# Patient Record
Sex: MALE | Race: WHITE | NOT HISPANIC OR LATINO | URBAN - METROPOLITAN AREA
[De-identification: names, ages, dates, MRNs, and addresses within clinical notes are randomized per-mention and may not be internally consistent; named-entity substitution may affect disease eponyms.]

---

## 2023-07-18 ENCOUNTER — INPATIENT (INPATIENT)
Facility: HOSPITAL | Age: 58
LOS: 9 days | Discharge: NURSING, REHAB OR LONG-TERM CARE | End: 2023-07-28
Attending: INTERNAL MEDICINE | Admitting: INTERNAL MEDICINE
Payer: COMMERCIAL

## 2023-07-18 VITALS
RESPIRATION RATE: 18 BRPM | HEART RATE: 106 BPM | DIASTOLIC BLOOD PRESSURE: 120 MMHG | SYSTOLIC BLOOD PRESSURE: 180 MMHG | TEMPERATURE: 98 F

## 2023-07-18 DIAGNOSIS — R82.90 UNSPECIFIED ABNORMAL FINDINGS IN URINE: ICD-10-CM

## 2023-07-18 DIAGNOSIS — R19.00 INTRA-ABDOMINAL AND PELVIC SWELLING, MASS AND LUMP, UNSPECIFIED SITE: ICD-10-CM

## 2023-07-18 DIAGNOSIS — Z87.898 PERSONAL HISTORY OF OTHER SPECIFIED CONDITIONS: ICD-10-CM

## 2023-07-18 DIAGNOSIS — D69.6 THROMBOCYTOPENIA, UNSPECIFIED: ICD-10-CM

## 2023-07-18 DIAGNOSIS — R18.8 OTHER ASCITES: ICD-10-CM

## 2023-07-18 DIAGNOSIS — R74.01 ELEVATION OF LEVELS OF LIVER TRANSAMINASE LEVELS: ICD-10-CM

## 2023-07-18 DIAGNOSIS — R14.0 ABDOMINAL DISTENSION (GASEOUS): ICD-10-CM

## 2023-07-18 DIAGNOSIS — Z98.890 OTHER SPECIFIED POSTPROCEDURAL STATES: Chronic | ICD-10-CM

## 2023-07-18 DIAGNOSIS — Z29.9 ENCOUNTER FOR PROPHYLACTIC MEASURES, UNSPECIFIED: ICD-10-CM

## 2023-07-18 DIAGNOSIS — D53.9 NUTRITIONAL ANEMIA, UNSPECIFIED: ICD-10-CM

## 2023-07-18 DIAGNOSIS — R79.89 OTHER SPECIFIED ABNORMAL FINDINGS OF BLOOD CHEMISTRY: ICD-10-CM

## 2023-07-18 DIAGNOSIS — Z78.9 OTHER SPECIFIED HEALTH STATUS: ICD-10-CM

## 2023-07-18 DIAGNOSIS — M79.89 OTHER SPECIFIED SOFT TISSUE DISORDERS: ICD-10-CM

## 2023-07-18 LAB
ALBUMIN SERPL ELPH-MCNC: 3.4 G/DL — SIGNIFICANT CHANGE UP (ref 3.3–5)
ALP SERPL-CCNC: 388 U/L — HIGH (ref 40–120)
ALT FLD-CCNC: 18 U/L — SIGNIFICANT CHANGE UP (ref 4–41)
ANION GAP SERPL CALC-SCNC: 16 MMOL/L — HIGH (ref 7–14)
APAP SERPL-MCNC: <10 UG/ML — LOW (ref 15–25)
APPEARANCE UR: ABNORMAL
APTT BLD: 45.8 SEC — HIGH (ref 27–36.3)
AST SERPL-CCNC: 91 U/L — HIGH (ref 4–40)
BASOPHILS # BLD AUTO: 0.04 K/UL — SIGNIFICANT CHANGE UP (ref 0–0.2)
BASOPHILS NFR BLD AUTO: 0.5 % — SIGNIFICANT CHANGE UP (ref 0–2)
BILIRUB SERPL-MCNC: 7.3 MG/DL — HIGH (ref 0.2–1.2)
BILIRUB UR-MCNC: ABNORMAL
BLD GP AB SCN SERPL QL: NEGATIVE — SIGNIFICANT CHANGE UP
BLOOD GAS VENOUS COMPREHENSIVE RESULT: SIGNIFICANT CHANGE UP
BUN SERPL-MCNC: 5 MG/DL — LOW (ref 7–23)
CALCIUM SERPL-MCNC: 8.3 MG/DL — LOW (ref 8.4–10.5)
CHLORIDE SERPL-SCNC: 97 MMOL/L — LOW (ref 98–107)
CO2 SERPL-SCNC: 23 MMOL/L — SIGNIFICANT CHANGE UP (ref 22–31)
COLOR SPEC: ABNORMAL
CREAT SERPL-MCNC: 0.61 MG/DL — SIGNIFICANT CHANGE UP (ref 0.5–1.3)
DIFF PNL FLD: NEGATIVE — SIGNIFICANT CHANGE UP
EGFR: 113 ML/MIN/1.73M2 — SIGNIFICANT CHANGE UP
EOSINOPHIL # BLD AUTO: 0.05 K/UL — SIGNIFICANT CHANGE UP (ref 0–0.5)
EOSINOPHIL NFR BLD AUTO: 0.6 % — SIGNIFICANT CHANGE UP (ref 0–6)
ETHANOL SERPL-MCNC: 35 MG/DL — HIGH
GLUCOSE SERPL-MCNC: 87 MG/DL — SIGNIFICANT CHANGE UP (ref 70–99)
GLUCOSE UR QL: NEGATIVE MG/DL — SIGNIFICANT CHANGE UP
HCT VFR BLD CALC: 36.3 % — LOW (ref 39–50)
HGB BLD-MCNC: 12.1 G/DL — LOW (ref 13–17)
IANC: 7.45 K/UL — HIGH (ref 1.8–7.4)
IMM GRANULOCYTES NFR BLD AUTO: 0.3 % — SIGNIFICANT CHANGE UP (ref 0–0.9)
INR BLD: 1.5 RATIO — HIGH (ref 0.88–1.16)
KETONES UR-MCNC: NEGATIVE MG/DL — SIGNIFICANT CHANGE UP
LEUKOCYTE ESTERASE UR-ACNC: ABNORMAL
LIDOCAIN IGE QN: 22 U/L — SIGNIFICANT CHANGE UP (ref 7–60)
LYMPHOCYTES # BLD AUTO: 0.69 K/UL — LOW (ref 1–3.3)
LYMPHOCYTES # BLD AUTO: 7.8 % — LOW (ref 13–44)
MAGNESIUM SERPL-MCNC: 1.1 MG/DL — LOW (ref 1.6–2.6)
MCHC RBC-ENTMCNC: 33.3 GM/DL — SIGNIFICANT CHANGE UP (ref 32–36)
MCHC RBC-ENTMCNC: 34.6 PG — HIGH (ref 27–34)
MCV RBC AUTO: 103.7 FL — HIGH (ref 80–100)
MONOCYTES # BLD AUTO: 0.6 K/UL — SIGNIFICANT CHANGE UP (ref 0–0.9)
MONOCYTES NFR BLD AUTO: 6.8 % — SIGNIFICANT CHANGE UP (ref 2–14)
NEUTROPHILS # BLD AUTO: 7.45 K/UL — HIGH (ref 1.8–7.4)
NEUTROPHILS NFR BLD AUTO: 84 % — HIGH (ref 43–77)
NITRITE UR-MCNC: POSITIVE
NRBC # BLD: 0 /100 WBCS — SIGNIFICANT CHANGE UP (ref 0–0)
NRBC # FLD: 0 K/UL — SIGNIFICANT CHANGE UP (ref 0–0)
NT-PROBNP SERPL-SCNC: 153 PG/ML — SIGNIFICANT CHANGE UP
PH UR: 5.5 — SIGNIFICANT CHANGE UP (ref 5–8)
PLATELET # BLD AUTO: 58 K/UL — LOW (ref 150–400)
POTASSIUM SERPL-MCNC: 3.6 MMOL/L — SIGNIFICANT CHANGE UP (ref 3.5–5.3)
POTASSIUM SERPL-SCNC: 3.6 MMOL/L — SIGNIFICANT CHANGE UP (ref 3.5–5.3)
PROT SERPL-MCNC: 7.9 G/DL — SIGNIFICANT CHANGE UP (ref 6–8.3)
PROT UR-MCNC: 30 MG/DL
PROTHROM AB SERPL-ACNC: 17.5 SEC — HIGH (ref 10.5–13.4)
RBC # BLD: 3.5 M/UL — LOW (ref 4.2–5.8)
RBC # FLD: 14.8 % — HIGH (ref 10.3–14.5)
RH IG SCN BLD-IMP: POSITIVE — SIGNIFICANT CHANGE UP
SODIUM SERPL-SCNC: 136 MMOL/L — SIGNIFICANT CHANGE UP (ref 135–145)
SP GR SPEC: 1.02 — SIGNIFICANT CHANGE UP (ref 1–1.03)
UROBILINOGEN FLD QL: 1 MG/DL — SIGNIFICANT CHANGE UP (ref 0.2–1)
WBC # BLD: 8.86 K/UL — SIGNIFICANT CHANGE UP (ref 3.8–10.5)
WBC # FLD AUTO: 8.86 K/UL — SIGNIFICANT CHANGE UP (ref 3.8–10.5)

## 2023-07-18 PROCEDURE — 71045 X-RAY EXAM CHEST 1 VIEW: CPT | Mod: 26

## 2023-07-18 PROCEDURE — 99223 1ST HOSP IP/OBS HIGH 75: CPT | Mod: GC

## 2023-07-18 PROCEDURE — 99285 EMERGENCY DEPT VISIT HI MDM: CPT

## 2023-07-18 RX ORDER — FOLIC ACID 0.8 MG
1 TABLET ORAL DAILY
Refills: 0 | Status: DISCONTINUED | OUTPATIENT
Start: 2023-07-19 | End: 2023-07-21

## 2023-07-18 RX ORDER — THIAMINE MONONITRATE (VIT B1) 100 MG
100 TABLET ORAL ONCE
Refills: 0 | Status: COMPLETED | OUTPATIENT
Start: 2023-07-18 | End: 2023-07-18

## 2023-07-18 RX ORDER — THIAMINE MONONITRATE (VIT B1) 100 MG
100 TABLET ORAL DAILY
Refills: 0 | Status: DISCONTINUED | OUTPATIENT
Start: 2023-07-19 | End: 2023-07-21

## 2023-07-18 RX ORDER — FOLIC ACID 0.8 MG
1 TABLET ORAL ONCE
Refills: 0 | Status: COMPLETED | OUTPATIENT
Start: 2023-07-18 | End: 2023-07-18

## 2023-07-18 RX ORDER — ACETAMINOPHEN 500 MG
650 TABLET ORAL EVERY 6 HOURS
Refills: 0 | Status: DISCONTINUED | OUTPATIENT
Start: 2023-07-18 | End: 2023-07-28

## 2023-07-18 RX ORDER — LANOLIN ALCOHOL/MO/W.PET/CERES
5 CREAM (GRAM) TOPICAL AT BEDTIME
Refills: 0 | Status: DISCONTINUED | OUTPATIENT
Start: 2023-07-18 | End: 2023-07-18

## 2023-07-18 RX ORDER — MAGNESIUM SULFATE 500 MG/ML
2 VIAL (ML) INJECTION
Refills: 0 | Status: COMPLETED | OUTPATIENT
Start: 2023-07-18 | End: 2023-07-18

## 2023-07-18 RX ORDER — ONDANSETRON 8 MG/1
4 TABLET, FILM COATED ORAL EVERY 8 HOURS
Refills: 0 | Status: DISCONTINUED | OUTPATIENT
Start: 2023-07-18 | End: 2023-07-18

## 2023-07-18 RX ORDER — LANOLIN ALCOHOL/MO/W.PET/CERES
3 CREAM (GRAM) TOPICAL AT BEDTIME
Refills: 0 | Status: DISCONTINUED | OUTPATIENT
Start: 2023-07-18 | End: 2023-07-18

## 2023-07-18 RX ADMIN — Medication 100 MILLIGRAM(S): at 14:36

## 2023-07-18 RX ADMIN — Medication 1 MILLIGRAM(S): at 14:36

## 2023-07-18 RX ADMIN — Medication 25 GRAM(S): at 18:19

## 2023-07-18 RX ADMIN — Medication 25 MILLIGRAM(S): at 14:36

## 2023-07-18 RX ADMIN — Medication 25 GRAM(S): at 20:33

## 2023-07-18 NOTE — ED ADULT TRIAGE NOTE - CHIEF COMPLAINT QUOTE
Pt states he drank a bottle of Nyquil over the past week, a little at a time, because he couldn't sleep. States after he started drinking it he developed swelling to abdomen, BLE swelling, and SOB. Denies chest pain. Denies cardiac history. Pt also noted with icteric sclera, states it developed over the past 2 weeks. Drinks whiskey every day, last drink 3 days ago. Hypertensive in triage, mildly tremulous.

## 2023-07-18 NOTE — ED ADULT NURSE NOTE - NS ED NOTE  TALK SOMEONE YN
Detail Level: Zone Initiate Treatment: Apply triamcinolone 0.1% BID x 2 weeks PRN flares Render In Strict Bullet Format?: No No

## 2023-07-18 NOTE — H&P ADULT - ATTENDING COMMENTS
56 yo M with PMHx of alcohol use disorder (3-5 drinks of double [place] whiskey 5 days a week x25 years) with no history of withdrawal syndrome presents with increasing abdominal distention and b/l LE edema for past 1.5 weeks, concerning for ascites in setting of possible liver failure from excessive alcohol use vs. Budd Chiari syndrome, lower suspicion for malignant ascites or 2/2 acute decompensated heart failure. CTAP with IV contrast and US abdominal dopplers ordered to further evaluate possible etiology of ascites. Pt also found to have elevated t bili to 7.3 and alk phos 388, concerning for possible biliary obstruction. MRCP w/wo contrast ordered, as unclear if visualization of biliary tree will be adequate on CTAP given ascites. Check direct vs. indirect bili and GGT. Given anemia and thrombocytopenia as well, will r/o MAHA/DIC process. F/u hemolysis labs, coags, D-dimer, and fibrinogen. Stool studies with C diff PCR, GI stool PCR, and stool cxs also ordered, as pt with multiple episodes of watery, nonbloody diarrhea.

## 2023-07-18 NOTE — H&P ADULT - PROBLEM SELECTOR PLAN 2
Last drink: a week ago, however pt w/ elevated alcohol blood level.    -s/p librium x1  -sx triggered ciwa  -seizure precautions  -aspiration precautions  -fall risk Pt with elevated alk phos and AST. Ddx: cholestatic/obstructive vs. hepatocellular injury    -trend LFTs  -f/u abdominal US  -MRCP for elevated TBili  -f/u dbili Pt with elevated t bili to 7.3, alk phos 388, AST 91, and ALT 18. Concerning for cholestasis/biliary obstruction with component of hepatocellular injury/hepatitis.     -f/u US abdominal dopplers and CTAP with IV contrast  -check MRCP w/wo contrast to evaluate biliary system  -check direct vs. indirect bili and GGT  -trend LFTs  -possible hepatology consult in AM pending imaging results Pt with elevated t bili to 7.3, alk phos 388, AST 91, and ALT 18. Concerning for cholestasis/biliary obstruction with component of hepatocellular injury/hepatitis.     -f/u US abdominal dopplers and CTAP with IV contrast  -check MRCP w/wo contrast to evaluate biliary system,  as unclear if visualization of biliary tree will be adequate on CTAP given ascites  -check direct vs. indirect bili and GGT  -trend LFTs  -possible hepatology consult in AM pending imaging results

## 2023-07-18 NOTE — PATIENT PROFILE ADULT - FALL HARM RISK - RISK INTERVENTIONS

## 2023-07-18 NOTE — ED ADULT NURSE NOTE - NSFALLUNIVINTERV_ED_ALL_ED
Bed/Stretcher in lowest position, wheels locked, appropriate side rails in place/Call bell, personal items and telephone in reach/Instruct patient to call for assistance before getting out of bed/chair/stretcher/Non-slip footwear applied when patient is off stretcher/[place] to call system/Physically safe environment - no spills, clutter or unnecessary equipment/Purposeful proactive rounding/Room/bathroom lighting operational, light cord in reach

## 2023-07-18 NOTE — H&P ADULT - NSHPPHYSICALEXAM_GEN_ALL_CORE
T(C): 37 (07-18-23 @ 16:17), Max: 37 (07-18-23 @ 16:17)  HR: 95 (07-18-23 @ 16:17) (95 - 106)  BP: 152/85 (07-18-23 @ 16:17) (152/85 - 180/120)  RR: 19 (07-18-23 @ 16:17) (18 - 20)  SpO2: 99% (07-18-23 @ 16:17) (99% - 99%)    PHYSICAL EXAM:  GENERAL: NAD, obese male in mild distress  HEAD:  Atraumatic, Normocephalic  EYES: EOMI, PERRLA, conjunctiva and sclera clear, + jaundice  ENMT: No tonsillar erythema, exudates, or enlargement; Moist mucous membranes  NECK: Supple, non tender, No JVD, Normal thyroid  HEART: Regular rate and rhythm; No murmurs, rubs, or gallops. S1/S2 present  RESPIRATORY: CTA B/L, No W/R/R  ABDOMEN: Soft, firm, Nontender, distended; Bowel sounds present. No hepatosplenomegaly. No fluid wave elicited.  NEUROLOGY: A&Ox3, nonfocal, moving all extremities  EXTREMITIES:  Pitting edema from knees down. 2+ Peripheral Pulses, No clubbing, cyanosis. 5/5 muscle tone in UE/LE. Black lesion on nail of R foot.   SKIN: erythematous circular lesions and patches below knees bilaterally, wrapping around calves. warm, dry T(C): 37 (07-18-23 @ 16:17), Max: 37 (07-18-23 @ 16:17)  HR: 95 (07-18-23 @ 16:17) (95 - 106)  BP: 152/85 (07-18-23 @ 16:17) (152/85 - 180/120)  RR: 19 (07-18-23 @ 16:17) (18 - 20)  SpO2: 99% (07-18-23 @ 16:17) (99% - 99%)    PHYSICAL EXAM:  GENERAL: NAD, obese male in mild distress  HEAD:  Atraumatic, Normocephalic  EYES: EOMI, PERRLA, conjunctiva and sclera clear, + jaundice  ENMT: No tonsillar erythema, exudates, or enlargement; Moist mucous membranes  NECK: Supple, non tender, No JVD, Normal thyroid  HEART: Regular rate and rhythm; No murmurs, rubs, or gallops. S1/S2 present  RESPIRATORY: CTA B/L, No W/R/R  ABDOMEN: Soft, firm, nontender with no rebound or guardind, distended; Bowel sounds present. No hepatosplenomegaly. No fluid wave elicited.  NEUROLOGY: A&Ox3, nonfocal, moving all extremities  EXTREMITIES:  Pitting edema from knees down. 2+ Peripheral Pulses, No clubbing, cyanosis. 5/5 muscle tone in UE/LE. Black lesion on nail of R foot.   SKIN: erythematous circular lesions and patches below knees bilaterally, wrapping around calves. warm, dry

## 2023-07-18 NOTE — H&P ADULT - PROBLEM SELECTOR PLAN 6
UA with nitrites and LE although contaminated.    -repeat UA Pt w/ leg edema. No  prior cardiac hx    -TTE r/o alcoholic cardiomyopathy Likely 2/2 cirrhosis  -trend plts  -transfuse <10 Likely from abdominal distention from ascites.    -vital signs q4  -pulse ox  -reevaluate following tap Plt count 58. Suspect from bone marrow suppression in setting of chronic alcohol use and possibly from liver failure. Low suspicion for TTP given lack of fevers, renal failure, and AMS, though DIC possibility given ?hyperpigmentation on b/l LE.    -monitor plt  -check coag, D-dimer, and fibrinogen to r/o DIC

## 2023-07-18 NOTE — H&P ADULT - NSHPLABSRESULTS_GEN_ALL_CORE
LABS: Personally reviewed labs, imaging, and ECG                          12.1   8.86  )-----------( 58       ( 2023 14:15 )             36.3           136  |  97<L>  |  5<L>  ----------------------------<  87  3.6   |  23  |  0.61    Ca    8.3<L>      2023 14:34  Mg     1.10         TPro  7.9  /  Alb  3.4  /  TBili  7.3<H>  /  DBili  x   /  AST  91<H>  /  ALT  18  /  AlkPhos  388<H>         LIVER FUNCTIONS - ( 2023 14:34 )  Alb: 3.4 g/dL / Pro: 7.9 g/dL / ALK PHOS: 388 U/L / ALT: 18 U/L / AST: 91 U/L / GGT: x                    Urinalysis Basic - ( 2023 16:42 )    Color: Orange / Appearance: Cloudy / S.024 / pH: x  Gluc: x / Ketone: Negative mg/dL  / Bili: Large / Urobili: 1.0 mg/dL   Blood: x / Protein: 30 mg/dL / Nitrite: Positive   Leuk Esterase: Small / RBC: 6 /HPF / WBC 1 /HPF   Sq Epi: x / Non Sq Epi: 3 /HPF / Bacteria: Negative /HPF        PT/INR - ( 2023 14:15 )   PT: 17.5 sec;   INR: 1.50 ratio         PTT - ( 2023 14:15 )  PTT:45.8 sec    Lactate Trend      CAPILLARY BLOOD GLUCOSE        RADIOLOGY & ADDITIONAL TESTS:     < from: Xray Chest 1 View AP/PA (23 @ 15:30) >    ACC: [id number] EXAM:  XR CHEST AP OR PA 1V   ORDERED BY: VIVIANA CUMMINGS     PROCEDURE DATE:  2023          INTERPRETATION:  EXAMINATION: XR CHEST    CLINICAL INDICATION: Shortness of breath.    TECHNIQUE: Single frontal, portable view of the chest was obtained.    COMPARISON: None available.    FINDINGS:  The cardiomediastinal silhouette is  normal in size.  The lungs are clear.  There is no pneumothorax or pleural effusion.  No acute bony abnormality.    IMPRESSION:  Clear lungs.      --- End of Report ---    < end of copied text > EKG personally reviewed.  SR with no acute ischemic changes, QTc prolonged at 504 ms.    Labs personally reviewed.                        12.1   8.86  )-----------( 58       ( 2023 14:15 )             36.3           136  |  97<L>  |  5<L>  ----------------------------<  87  3.6   |  23  |  0.61    Ca    8.3<L>      2023 14:34  Mg     1.10         TPro  7.9  /  Alb  3.4  /  TBili  7.3<H>  /  DBili  x   /  AST  91<H>  /  ALT  18  /  AlkPhos  388<H>       LIVER FUNCTIONS - ( 2023 14:34 )  Alb: 3.4 g/dL / Pro: 7.9 g/dL / ALK PHOS: 388 U/L / ALT: 18 U/L / AST: 91 U/L / GGT: x              Urinalysis Basic - ( 2023 16:42 )    Color: Orange / Appearance: Cloudy / S.024 / pH: x  Gluc: x / Ketone: Negative mg/dL  / Bili: Large / Urobili: 1.0 mg/dL   Blood: x / Protein: 30 mg/dL / Nitrite: Positive   Leuk Esterase: Small / RBC: 6 /HPF / WBC 1 /HPF   Sq Epi: x / Non Sq Epi: 3 /HPF / Bacteria: Negative /HPF    PT/INR - ( 2023 14:15 )   PT: 17.5 sec;   INR: 1.50 ratio      PTT - ( 2023 14:15 )  PTT:45.8 sec    Imaging personally reviewed.    < from: Xray Chest 1 View AP/PA (23 @ 15:30) >    ACC: [id number] EXAM:  XR CHEST AP OR PA 1V   ORDERED BY: VIVIANA CUMMINGS     PROCEDURE DATE:  2023          INTERPRETATION:  EXAMINATION: XR CHEST    CLINICAL INDICATION: Shortness of breath.    TECHNIQUE: Single frontal, portable view of the chest was obtained.    COMPARISON: None available.    FINDINGS:  The cardiomediastinal silhouette is  normal in size.  The lungs are clear.  There is no pneumothorax or pleural effusion.  No acute bony abnormality.    IMPRESSION:  Clear lungs.      --- End of Report ---    < end of copied text >

## 2023-07-18 NOTE — H&P ADULT - NSHPSOCIALHISTORY_GEN_ALL_CORE
Pt drinks 3-5 "double glass" of nayana whiskey after work for the past 25 years. Denied hx of withdrawal. Pt used to smoke a few tobacco cigarettes a day 20 years ago since high school. Pt endorsed hx of drug use with LSD and cocaine in the 70's but no longer uses illicit drugs. He lives at home alone, but his daughter lives close by and visits frequently. He works as a .  Has not seen a doctor since before COVID. Pt lives at home alone, but his daughter lives close by and visits frequently. He works as a . Has not seen a doctor since before COVID.

## 2023-07-18 NOTE — ED ADULT NURSE NOTE - OBJECTIVE STATEMENT
pt A&ox4, coming to ED from home for abdominal swelling and SOB after taking 1 bottle of Nyquil over 1 week. pt states he drinks 3-5 doubles of whiskey every night for multiple years. denies going through ETOH withdrawal or seizures. pt endorses SOB and leg swelling. pt denies H/A , Dizziness , lightheadedness and radiating chest pain. sinus tachycardic on telemetry. breathing is spontaneous and unlabored. sating 99% on RA. abdomen is firm, distended, and nontender. bilateral lower extremities swelling noted. bilateral pedal and radial pulses palpable and strong. left 18g IV placed Labs drawn and sent as per ordered. Bed in lowest position, call bell within reach, all other safety and comfort measures provided. awaiting labs results and further orders.

## 2023-07-18 NOTE — H&P ADULT - NSHPREVIEWOFSYSTEMS_GEN_ALL_CORE
REVIEW OF SYSTEMS:    CONSTITUTIONAL: No weakness, fevers or chills  EYES/ENT: No visual changes;  No vertigo or throat pain   NECK: No pain or stiffness  RESPIRATORY: + SOB. No cough, wheezing, hemoptysis  CARDIOVASCULAR: No chest pain or palpitations  GASTROINTESTINAL: + loose stools. No abdominal or epigastric pain. No nausea, vomiting, or hematemesis; No constipation. No melena or hematochezia.  GENITOURINARY: No dysuria, frequency or hematuria  NEUROLOGICAL: No numbness or weakness  SKIN: +rashes of LE Constitutional: +Weight gain. No generalized weakness, fevers, chills, or weight loss.  Eyes: No visual changes, double vision, or eye pain  Ears, Nose, Mouth, Throat: No runny nose, sinus pain, ear pain, tinnitus, sore throat, dysphagia, or odynophagia  Cardiovascular: No chest pain, palpitations, or LE edema  Respiratory: +SOB. No cough, wheezing, or hemoptysis.  Gastrointestinal: +Abdominal distention and watery diarrhea. No abdominal pain, nausea/vomiting, constipation, hematemesis, melena, or BRBPR.  Genitourinary: No dysuria, frequency, urgency, or hematuria  Musculoskeletal: No neck pain or back pain. No joint pain, swelling, or decreased ROM.  Skin: +B/l LE rash. No lesions or wounds  Neurologic: No syncope, seizures, headache, paresthesias, numbness, or limb weakness  Psychiatric: No depression, anxiety, difficulty concentrating, anhedonia, or lack of energy  Endocrine: No heat/cold intolerance, mood swings, sweats, polydipsia, or polyuria  Hematologic/lymphatic: No purpura, petechia, or prolonged or excessive bleeding after dental extraction / injury    Positives and pertinent negatives noted and all other systems negative.

## 2023-07-18 NOTE — ED PROVIDER NOTE - CLINICAL SUMMARY MEDICAL DECISION MAKING FREE TEXT BOX
[facility] PGY3: 56yo M with PMH of EtOH abuse presents to ED for eval of abd swelling & jaundice in setting of EtOH and possible overuse of acetaminophen. Last EtOH yesterday night. Check labs including hepatitis/EtOH/acetaminophen level. SOB likely due to massive ascites. Plan for therapeutic tap, low concern for SBP but will send studies post-tap. Likely TBA.

## 2023-07-18 NOTE — H&P ADULT - PROBLEM SELECTOR PLAN 7
DVT ppx: hold for tap    Health maintenance:  A1c, lipid panel    Diet: NPO for now    Dispo: [facility] UA with nitrites and LE although contaminated.    -repeat UA Pt w/ leg edema. No  prior cardiac hx    -TTE r/o alcoholic cardiomyopathy Likely 2/2 cirrhosis  -trend plts  -transfuse <10 Pt drinks 3-5 "double glass" of nayana whiskey after work for the past 25 years. Denied hx of withdrawal. Last drink: a week ago, however pt w/ elevated alcohol blood level.    -s/p librium x1  -sx triggered ciwa  -thiamine 100 mg 7/18-7/22  -folic acid 1 mcg daily  -seizure precautions  -aspiration precautions  -fall risk  -SW consult Pt drinks 3-5 "double glass" of French whiskey after work for the past 25 years. Denies hx of withdrawal syndrome. Last drink a week ago, but pt with elevated blood alcohol level. On exam, no tongue fasciculations or tremors.    -s/p librium x1  -start symptom-triggered CIWA with Ativan for now  -start PO thiamine 100 mg daily, folic acid 1 mg daily, and MVI  -aspiration precautions  -seizure precautions  -fall risk protocol  -SW consult

## 2023-07-18 NOTE — H&P ADULT - HISTORY OF PRESENT ILLNESS
56 yo M with PMHx of EtOH abuse (3-5 double nayana whiskey 5 days a week x25 years) presents for abdominal and leg swelling since last Sat (7/8) after his children insisted he seek help. Pt stated that the swelling started in his ankles and progressed upward, with his abdomen gradually growing in size. For last 1.5 wk he has also been taking 'gulps' of nyquil (acetaminophen 1000, benadryl 50) for sleep only once daily or every other day. Pt has not seen a doctor since before COVID because it was difficult to get an appointment. Pt stated that since the swelling started, his bowel movements have been more loose (last BM yesterday) and his kids noticed his eyes have turned yellow. He also feels SOB at rest and with exertion. Lying at angle makes the SOB more tolerable. Pt stated his last drink was a week ago because his daughter works at a bar and would not let him drink. Denied hx of alcohol withdrawal. Denied abdominal pain, CP, nausea or vomiting. He stated he has not eaten since last night because he did not want to make the swelling worse. Pt also with rashes below the knees which started a few months ago. He tried creams which did not help.     In ED, pt received librium 25 x1, folic acid, mag x2, thiamine.  56 yo M with PMHx of alcohol use disorder (3-5 double nayana whiskey 5 days a week x25 years) presents for abdominal and leg swelling since last Sat (7/8) after his children insisted he seek help. Pt stated that the swelling started in his ankles and progressed upward, with his abdomen gradually growing in size. For last 1.5 wk he has also been taking 'gulps' of nyquil (acetaminophen 1000, benadryl 50) for sleep only once daily or every other day. Pt has not seen a doctor since before COVID because it was difficult to get an appointment. Pt stated that since the swelling started, his bowel movements have been more loose (last BM yesterday) and his kids noticed his eyes have turned yellow. He also feels SOB at rest and with exertion. Lying at angle makes the SOB more tolerable. Pt stated his last drink was a week ago because his daughter works at a bar and would not let him drink. Denied hx of alcohol withdrawal. Denied abdominal pain, CP, nausea or vomiting. He stated he has not eaten since last night because he did not want to make the swelling worse. Pt also with rashes below the knees which started a few months ago. He tried creams which did not help.     In ED, pt received librium 25 x1, folic acid, mag x2, thiamine.  58 yo M with PMHx of alcohol use disorder (3-5 drinks of double [place] whiskey 5 days a week x25 years) with no history of withdrawal syndrome presents with increasing abdominal distention and b/l LE edema since last Saturday (7/8/23) after his children insisted he seek help. Pt stated that the swelling started in his ankles and progressed upward, with his abdomen gradually growing in size. For last 1.5 wks, he has also been taking 'gulps' of NyQuil (acetaminophen 1000, benadryl 50) for sleep only once daily or every other day. Pt has not seen a doctor since before COVID because it was difficult to get an appointment. Pt stated that since the swelling started, his bowel movements have been more loose (last BM yesterday) and his kids noticed his eyes have turned yellow. He also feels SOB at rest and with exertion. Lying at angle makes the SOB more tolerable. Pt stated his last drink was a week ago because his daughter works at a bar and would not let him drink. Denied hx of alcohol withdrawal. Denied abdominal pain, CP, nausea or vomiting. He stated he has not eaten since last night because he did not want to make the swelling worse. Pt also with rashes below the knees which started a few months ago. He tried creams which did not help.     In ED, pt received librium 25 x1, folic acid, mag x2, thiamine.  56 yo M with PMHx of alcohol use disorder (3-5 drinks of double [place] whiskey 5 days a week x25 years) with no history of withdrawal syndrome presents with increasing abdominal distention and b/l LE edema since last Saturday (7/8/23) after his children insisted he seek help. Pt stated that his LE edema started in his ankles and progressed upward, with his abdomen gradually growing in size. For last 1.5 wks, he has also been taking 'gulps' of NyQuil (acetaminophen 1000, benadryl 50) for sleep only once daily or every other day. Pt has not seen a doctor since before COVID because it was difficult to get an appointment. Pt stated that since the swelling started, his bowel movements were getting more loose, and his kids noticed his eyes were yellow. He also feels SOB at rest and with exertion. Lying at angle makes the SOB more tolerable. Pt stated his last drink was a week ago because his daughter works at a bar and would not let him drink. Denied hx of alcohol withdrawal. Denied abdominal pain, CP, nausea or vomiting. He stated he has not eaten since last night because he did not want to make the swelling worse. This morning, pt started having multiple episodes of watery nonbloody diarrhea, at least 5 before coming to the ED. Pt also with rashes below the knees which started a few months ago. He tried creams which did not help.     In ED, pt received librium 25 x1, folic acid, mag x2, thiamine.

## 2023-07-18 NOTE — H&P ADULT - PROBLEM SELECTOR PLAN 9
DVT ppx: hold for tap    Health maintenance:  A1c, lipid panel    Diet: NPO for now    Dispo: [facility] UA with nitrites and LE although contaminated.    -repeat UA QTc on admission  ms. Likely in setting of hypomagnesemia with serum Mg 1.1 on admission.     -avoid QT prolonging agents  -replete serum Mg with IV MgSO4 and recheck QTc with repeat EKG

## 2023-07-18 NOTE — H&P ADULT - PROBLEM SELECTOR PLAN 5
DVT ppx: hold for tap    Diet: NPO for now    Dispo: [facility] Pt with elevated alk phos and AST. Ddx: cirrhosis vs. fatty liver    -trend LFTs  -f/u abdominal US Likely 2/2 cirrhosis  -trend plts  -transfuse <10 Likely from abdominal distention from ascites.    -vital signs q4  -pulse ox  -reevaluate following tap Pt w/ recent diarrhea, no N/V.    -c.diff   -isolation precautions  -GI PCR  -stool cx Pt reporting 5 episodes of watery, nonbloody diarrhea that started Tuesday morning.     - C diff PCR, GI stool PCR, and stool cxs ordered  - F/u CTAP with IV contrast  - Monitor stool count  - Monitor electrolytes and replete PRN Hgb 12.1 with . Macrocytic anemia, likely in setting of chronic alcohol use with folate/vit B12 deficiency, though given elevated t bili and thrombocytopenia, MAHA also a possibility.    -monitor H/H   -check iron studies, folate, vit B12  -check hemolysis labs, including LDH, haptoglobin, retic count  -check coag, D-dimer, and fibrinogen

## 2023-07-18 NOTE — ED PROVIDER NOTE - ATTENDING CONTRIBUTION TO CARE
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

## 2023-07-18 NOTE — ED ADULT NURSE REASSESSMENT NOTE - NS ED NURSE REASSESS COMMENT FT1
pt A&ox4, denies chest pain. no complaints of pain. denies headache, dizziness, lightheadedness, radiating chest pain. breathing is spontaneous and unlabored. NSR on continuos cardiac and pulse ox monitoring in place. bed in lowest position, call bell within reach, siderails up x2. will continue to monitor.

## 2023-07-18 NOTE — H&P ADULT - PROBLEM SELECTOR PLAN 3
-trend hgb  -B12, folate  -supplement with folic acid daily -trend hgb  -B12, folate, iron  -retic, LDH, hapto  -supplement with folic acid daily Pt reporting 5 episodes of watery, nonbloody diarrhea that started Tuesday morning. Unclear etiology.     -C diff PCR, GI stool PCR, and stool cxs ordered  -f/u CTAP with IV contrast  -monitor stool count  -monitor electrolytes and replete PRN  -isolation precautions pending C diff

## 2023-07-18 NOTE — H&P ADULT - PROBLEM SELECTOR PLAN 11
DVT ppx: hold for tap    Health maintenance:  A1c, lipid panel    Diet: NPO for now    Dispo: [facility]

## 2023-07-18 NOTE — H&P ADULT - PROBLEM SELECTOR PLAN 1
Likely ascites iso chronic alcohol use disorder.    -placed procedure team order  -therapeutic tap w/ studies (saag, cell count diff, cx, total protein)  -pt currently w/o leukocytosis or metabolic acidosis unlikely SBP  -abdominal ultrasound  -CT abdomen  -thiamine 100 mg 7/18-7/22  -folic acid 1 mcg daily Likely ascites iso chronic alcohol use disorder.    -placed procedure team order  -therapeutic tap w/ studies (saag, cell count diff, cx, total protein)  -pt currently w/o fevers, leukocytosis, or abdominal pain/tenderness, unlikely SBP  -abdominal US ordered  -CTAP with IV contrast ordered as urgent, attempted to expedite overnight  -serial abdominal exams Pt with 1.5 weeks of increasing abdominal distention and b/l LE edema. On exam. tense ascites appreciated. Concerning for ascites in setting of possible liver failure vs. Budd Chiari syndrome, less likely malignant ascites or from acute decompensated heart failure.    -pt will likely need diagnostic and large-volume therapeutic paracentesis -> procedure team consult placed  -possible IR-guided paracentesis if adequate window unable to be obtained  -pt currently w/o fevers, leukocytosis, or abdominal pain/tenderness, unlikely SBP -> monitor off antibiotics for now  -US abdominal dopplers ordered to evaluate for cirrhosis and r/o hepatic vein thrombosis/Budd Chiari syndrome  -CTAP with IV contrast ordered as urgent, attempted to expedite overnight, but per ED, CT studies currently taking 5-6 hours  -TTE ordered to r/o structural heart disease  -serial abdominal exams Pt with 1.5 weeks of increasing abdominal distention and b/l LE edema. On exam. tense ascites appreciated. Concerning for ascites in setting of possible liver failure from alcohol/Tylenol use vs. Budd Chiari syndrome, less likely malignant ascites or from acute decompensated heart failure.    -pt will likely need diagnostic and large-volume therapeutic paracentesis -> procedure team consult placed  -possible IR-guided paracentesis if adequate window unable to be obtained  -pt currently w/o fevers, leukocytosis, or abdominal pain/tenderness, unlikely SBP -> monitor off antibiotics for now  -US abdominal dopplers ordered to evaluate for cirrhosis and r/o hepatic vein thrombosis/Budd Chiari syndrome  -CTAP with IV contrast ordered as urgent, attempted to expedite overnight, but per ED, CT studies currently taking 5-6 hours  -TTE ordered to r/o structural heart disease  -serial abdominal exams  -acetaminophen level negative

## 2023-07-18 NOTE — H&P ADULT - PROBLEM SELECTOR PLAN 10
DVT ppx: hold for tap    Health maintenance:  A1c, lipid panel    Diet: NPO for now    Dispo: AMIRAH QTc on admission  ms. Likely in setting of hypomagnesemia.     - Avoid QT prolonging agents  - Replete serum Mg and recheck QTc with repeat EKG DVT ppx: Hold pharmacologic ppx for now given possible paracentesis in AM and thrombocytopenia  Diet: NPO for now pending MRCP

## 2023-07-18 NOTE — ED PROVIDER NOTE - PROGRESS NOTE DETAILS
[place] PGY3: offered patient tap given prior SOB but pt feels improved and doesn't want to be stuck twice so declines ED tap at this time. O2 still >95% and breathing more comfortably. Hospitalist request CT, ordered.

## 2023-07-18 NOTE — ED PROVIDER NOTE - PHYSICAL EXAMINATION
CONSTITUTIONAL: NAD  SKIN: jaundiced, dry  HEAD: NCAT  EYES: NL inspection, +icterus   ENT: dry oral mucosa  NECK: Supple; non tender.  CARD: RRR  RESP: shallow, mildly tacypnic but lungs clear to auscultation  ABD: tense, very distended, +fluid wave, non-tender  EXT: b/l pitting edema  NEURO: Grossly non-focal   PSYCH: Cooperative, appropriate.

## 2023-07-18 NOTE — ED PROVIDER NOTE - OBJECTIVE STATEMENT
56yo M with PMH of EtOH abuse presents to ED for eval of abd swelling. For last 1.5 wk has been taking 'gulps' of nytquil (acetaminophen 1000, benadryl 50) for sleep only once daily. Also drinks 5-6 double beer/daily. For last wk had inc abd girth and LE edema, now cannot close his pants. No hx of liver disease. No CP, NVDC. Tolerating PO but dec appetite. Feels SOB as abd has expanded. No hx of cards dx.

## 2023-07-18 NOTE — H&P ADULT - ASSESSMENT
58 yo M with PMHx of EtOH abuse (3-5 double nayana whiskey 5 days a week x25 years) and poor follow up with physicians presents for swelling of abdomen and legs for past 10 days, likely ascites iso alcoholic cirrhosis.  56 yo M with PMHx of alcohol use disorder (3-5 drinks of double [place] whiskey 5 days a week x25 years) with no history of withdrawal syndrome presents with increasing abdominal distention and b/l LE edema for past 1.5 weeks, concerning for ascites in setting of possible liver failure.

## 2023-07-18 NOTE — H&P ADULT - PROBLEM SELECTOR PLAN 8
DVT ppx: hold for tap    Health maintenance:  A1c, lipid panel    Diet: NPO for now    Dispo: AMIRAH UA with nitrites and LE although contaminated.    -repeat UA Pt w/ leg edema. No  prior cardiac hx    -TTE r/o alcoholic cardiomyopathy UA with positive nitrite and small LE. Pt, however, with no urinary complaints.    -monitor off antibiotics for now

## 2023-07-18 NOTE — H&P ADULT - PROBLEM SELECTOR PLAN 4
Likely 2/2 cirrhosis  -trend plts  -transfuse <10 Pt drinks 3-5 "double glass" of [place] whiskey after work for the past 25 years. Denied hx of withdrawal. Last drink: a week ago, however pt w/ elevated alcohol blood level.    -s/p librium x1  -sx triggered ciwa  -seizure precautions  -aspiration precautions  -fall risk  -SW consult Pt drinks 3-5 "double glass" of nayana whiskey after work for the past 25 years. Denied hx of withdrawal. Last drink: a week ago, however pt w/ elevated alcohol blood level.    -s/p librium x1  -sx triggered ciwa  -thiamine 100 mg 7/18-7/22  -folic acid 1 mcg daily  -seizure precautions  -aspiration precautions  -fall risk  -SW consult Likely from abdominal distention 2/2 ascites. Pt currently maintaining SpO2 high 90s to 100% RA.     -monitor pulse ox q4hrs  -f/u TTE to r/o structural heart disease   -re-evaluate for persistence of SOB following therapeutic paracentesis

## 2023-07-18 NOTE — H&P ADULT - PROBLEM SELECTOR PROBLEM 5
Encounter for deep vein thrombosis (DVT) prophylaxis Transaminitis Thrombocytopenia SOB (shortness of breath) Diarrhea Macrocytic anemia

## 2023-07-19 DIAGNOSIS — E78.5 HYPERLIPIDEMIA, UNSPECIFIED: ICD-10-CM

## 2023-07-19 DIAGNOSIS — Z29.9 ENCOUNTER FOR PROPHYLACTIC MEASURES, UNSPECIFIED: ICD-10-CM

## 2023-07-19 DIAGNOSIS — M79.89 OTHER SPECIFIED SOFT TISSUE DISORDERS: ICD-10-CM

## 2023-07-19 DIAGNOSIS — R19.7 DIARRHEA, UNSPECIFIED: ICD-10-CM

## 2023-07-19 DIAGNOSIS — R21 RASH AND OTHER NONSPECIFIC SKIN ERUPTION: ICD-10-CM

## 2023-07-19 DIAGNOSIS — R94.31 ABNORMAL ELECTROCARDIOGRAM [ECG] [EKG]: ICD-10-CM

## 2023-07-19 DIAGNOSIS — E87.8 OTHER DISORDERS OF ELECTROLYTE AND FLUID BALANCE, NOT ELSEWHERE CLASSIFIED: ICD-10-CM

## 2023-07-19 DIAGNOSIS — R06.02 SHORTNESS OF BREATH: ICD-10-CM

## 2023-07-19 LAB
A1C WITH ESTIMATED AVERAGE GLUCOSE RESULT: 4.6 % — SIGNIFICANT CHANGE UP (ref 4–5.6)
ALBUMIN SERPL ELPH-MCNC: 3 G/DL — LOW (ref 3.3–5)
ALP SERPL-CCNC: 342 U/L — HIGH (ref 40–120)
ALT FLD-CCNC: 16 U/L — SIGNIFICANT CHANGE UP (ref 4–41)
ANION GAP SERPL CALC-SCNC: 15 MMOL/L — HIGH (ref 7–14)
APPEARANCE UR: ABNORMAL
APPEARANCE UR: ABNORMAL
AST SERPL-CCNC: 83 U/L — HIGH (ref 4–40)
BACTERIA # UR AUTO: NEGATIVE /HPF — SIGNIFICANT CHANGE UP
BASOPHILS # BLD AUTO: 0.05 K/UL — SIGNIFICANT CHANGE UP (ref 0–0.2)
BASOPHILS NFR BLD AUTO: 0.7 % — SIGNIFICANT CHANGE UP (ref 0–2)
BILIRUB DIRECT SERPL-MCNC: 4.5 MG/DL — HIGH (ref 0–0.3)
BILIRUB SERPL-MCNC: 9 MG/DL — HIGH (ref 0.2–1.2)
BILIRUB UR-MCNC: ABNORMAL
BILIRUB UR-MCNC: ABNORMAL
BUN SERPL-MCNC: 8 MG/DL — SIGNIFICANT CHANGE UP (ref 7–23)
C DIFF BY PCR RESULT: DETECTED
CALCIUM SERPL-MCNC: 8.3 MG/DL — LOW (ref 8.4–10.5)
CAST: 2 /LPF — SIGNIFICANT CHANGE UP (ref 0–4)
CHLORIDE SERPL-SCNC: 98 MMOL/L — SIGNIFICANT CHANGE UP (ref 98–107)
CHOLEST SERPL-MCNC: 211 MG/DL — HIGH
CO2 SERPL-SCNC: 22 MMOL/L — SIGNIFICANT CHANGE UP (ref 22–31)
COLOR SPEC: ABNORMAL
COLOR SPEC: ABNORMAL
COMMENT - URINE: SIGNIFICANT CHANGE UP
CREAT SERPL-MCNC: 0.59 MG/DL — SIGNIFICANT CHANGE UP (ref 0.5–1.3)
D DIMER BLD IA.RAPID-MCNC: 2267 NG/ML DDU — HIGH
DIFF PNL FLD: NEGATIVE — SIGNIFICANT CHANGE UP
DIFF PNL FLD: NEGATIVE — SIGNIFICANT CHANGE UP
EGFR: 114 ML/MIN/1.73M2 — SIGNIFICANT CHANGE UP
EOSINOPHIL # BLD AUTO: 0.08 K/UL — SIGNIFICANT CHANGE UP (ref 0–0.5)
EOSINOPHIL NFR BLD AUTO: 1.1 % — SIGNIFICANT CHANGE UP (ref 0–6)
ESTIMATED AVERAGE GLUCOSE: 85 — SIGNIFICANT CHANGE UP
GGT SERPL-CCNC: 431 U/L — HIGH (ref 8–61)
GI PCR PANEL: SIGNIFICANT CHANGE UP
GLUCOSE SERPL-MCNC: 91 MG/DL — SIGNIFICANT CHANGE UP (ref 70–99)
GLUCOSE UR QL: NEGATIVE MG/DL — SIGNIFICANT CHANGE UP
GLUCOSE UR QL: NEGATIVE MG/DL — SIGNIFICANT CHANGE UP
HAPTOGLOB SERPL-MCNC: 105 MG/DL — SIGNIFICANT CHANGE UP (ref 34–200)
HAV IGM SER-ACNC: SIGNIFICANT CHANGE UP
HBV CORE IGM SER-ACNC: SIGNIFICANT CHANGE UP
HBV SURFACE AG SER-ACNC: SIGNIFICANT CHANGE UP
HCT VFR BLD CALC: 35.9 % — LOW (ref 39–50)
HCV AB S/CO SERPL IA: 0.11 S/CO — SIGNIFICANT CHANGE UP (ref 0–0.99)
HCV AB SERPL-IMP: SIGNIFICANT CHANGE UP
HDLC SERPL-MCNC: 21 MG/DL — LOW
HGB BLD-MCNC: 12 G/DL — LOW (ref 13–17)
IANC: 5.98 K/UL — SIGNIFICANT CHANGE UP (ref 1.8–7.4)
IMM GRANULOCYTES NFR BLD AUTO: 0.3 % — SIGNIFICANT CHANGE UP (ref 0–0.9)
IRON SATN MFR SERPL: 161 UG/DL — SIGNIFICANT CHANGE UP (ref 45–165)
KETONES UR-MCNC: NEGATIVE MG/DL — SIGNIFICANT CHANGE UP
KETONES UR-MCNC: NEGATIVE MG/DL — SIGNIFICANT CHANGE UP
LDH SERPL L TO P-CCNC: 290 U/L — HIGH (ref 135–225)
LEUKOCYTE ESTERASE UR-ACNC: ABNORMAL
LEUKOCYTE ESTERASE UR-ACNC: ABNORMAL
LIPID PNL WITH DIRECT LDL SERPL: 160 MG/DL — HIGH
LYMPHOCYTES # BLD AUTO: 0.8 K/UL — LOW (ref 1–3.3)
LYMPHOCYTES # BLD AUTO: 10.7 % — LOW (ref 13–44)
MAGNESIUM SERPL-MCNC: 1.5 MG/DL — LOW (ref 1.6–2.6)
MCHC RBC-ENTMCNC: 33.4 GM/DL — SIGNIFICANT CHANGE UP (ref 32–36)
MCHC RBC-ENTMCNC: 35.6 PG — HIGH (ref 27–34)
MCV RBC AUTO: 106.5 FL — HIGH (ref 80–100)
MONOCYTES # BLD AUTO: 0.55 K/UL — SIGNIFICANT CHANGE UP (ref 0–0.9)
MONOCYTES NFR BLD AUTO: 7.4 % — SIGNIFICANT CHANGE UP (ref 2–14)
NEUTROPHILS # BLD AUTO: 5.98 K/UL — SIGNIFICANT CHANGE UP (ref 1.8–7.4)
NEUTROPHILS NFR BLD AUTO: 79.8 % — HIGH (ref 43–77)
NITRITE UR-MCNC: POSITIVE
NITRITE UR-MCNC: POSITIVE
NON HDL CHOLESTEROL: 190 MG/DL — HIGH
NRBC # BLD: 0 /100 WBCS — SIGNIFICANT CHANGE UP (ref 0–0)
NRBC # FLD: 0 K/UL — SIGNIFICANT CHANGE UP (ref 0–0)
PH UR: 6.5 — SIGNIFICANT CHANGE UP (ref 5–8)
PH UR: 6.5 — SIGNIFICANT CHANGE UP (ref 5–8)
PHOSPHATE SERPL-MCNC: 2.6 MG/DL — SIGNIFICANT CHANGE UP (ref 2.5–4.5)
PLATELET # BLD AUTO: 44 K/UL — LOW (ref 150–400)
POTASSIUM SERPL-MCNC: 3.6 MMOL/L — SIGNIFICANT CHANGE UP (ref 3.5–5.3)
POTASSIUM SERPL-SCNC: 3.6 MMOL/L — SIGNIFICANT CHANGE UP (ref 3.5–5.3)
PROT SERPL-MCNC: 7.6 G/DL — SIGNIFICANT CHANGE UP (ref 6–8.3)
PROT UR-MCNC: SIGNIFICANT CHANGE UP MG/DL
PROT UR-MCNC: SIGNIFICANT CHANGE UP MG/DL
RBC # BLD: 3.32 M/UL — LOW (ref 4.2–5.8)
RBC # BLD: 3.37 M/UL — LOW (ref 4.2–5.8)
RBC # FLD: 15 % — HIGH (ref 10.3–14.5)
RBC CASTS # UR COMP ASSIST: 4 /HPF — SIGNIFICANT CHANGE UP (ref 0–4)
RETICS #: 99.3 K/UL — SIGNIFICANT CHANGE UP (ref 25–125)
RETICS/RBC NFR: 3 % — HIGH (ref 0.5–2.5)
REVIEW: SIGNIFICANT CHANGE UP
SODIUM SERPL-SCNC: 135 MMOL/L — SIGNIFICANT CHANGE UP (ref 135–145)
SP GR SPEC: 1.02 — SIGNIFICANT CHANGE UP (ref 1–1.03)
SP GR SPEC: 1.02 — SIGNIFICANT CHANGE UP (ref 1–1.03)
SQUAMOUS # UR AUTO: 1 /HPF — SIGNIFICANT CHANGE UP (ref 0–5)
TIBC SERPL-MCNC: <191 UG/DL — LOW (ref 220–430)
TRIGL SERPL-MCNC: 148 MG/DL — SIGNIFICANT CHANGE UP
TSH SERPL-MCNC: 3.24 UIU/ML — SIGNIFICANT CHANGE UP (ref 0.27–4.2)
UIBC SERPL-MCNC: <30 UG/DL — LOW (ref 110–370)
UROBILINOGEN FLD QL: 4 MG/DL (ref 0.2–1)
UROBILINOGEN FLD QL: 4 MG/DL (ref 0.2–1)
WBC # BLD: 7.48 K/UL — SIGNIFICANT CHANGE UP (ref 3.8–10.5)
WBC # FLD AUTO: 7.48 K/UL — SIGNIFICANT CHANGE UP (ref 3.8–10.5)
WBC UR QL: 0 /HPF — SIGNIFICANT CHANGE UP (ref 0–5)

## 2023-07-19 PROCEDURE — 76705 ECHO EXAM OF ABDOMEN: CPT | Mod: 26

## 2023-07-19 PROCEDURE — 93970 EXTREMITY STUDY: CPT | Mod: 26

## 2023-07-19 PROCEDURE — 99233 SBSQ HOSP IP/OBS HIGH 50: CPT | Mod: GC

## 2023-07-19 PROCEDURE — 99223 1ST HOSP IP/OBS HIGH 75: CPT

## 2023-07-19 PROCEDURE — 93306 TTE W/DOPPLER COMPLETE: CPT | Mod: 26

## 2023-07-19 RX ORDER — CHOLESTYRAMINE 4 G/9G
4 POWDER, FOR SUSPENSION ORAL DAILY
Refills: 0 | Status: DISCONTINUED | OUTPATIENT
Start: 2023-07-19 | End: 2023-07-25

## 2023-07-19 RX ORDER — MAGNESIUM SULFATE 500 MG/ML
1 VIAL (ML) INJECTION ONCE
Refills: 0 | Status: COMPLETED | OUTPATIENT
Start: 2023-07-19 | End: 2023-07-19

## 2023-07-19 RX ORDER — HEPARIN SODIUM 5000 [USP'U]/ML
5000 INJECTION INTRAVENOUS; SUBCUTANEOUS EVERY 8 HOURS
Refills: 0 | Status: DISCONTINUED | OUTPATIENT
Start: 2023-07-19 | End: 2023-07-28

## 2023-07-19 RX ADMIN — HEPARIN SODIUM 5000 UNIT(S): 5000 INJECTION INTRAVENOUS; SUBCUTANEOUS at 21:33

## 2023-07-19 RX ADMIN — HEPARIN SODIUM 5000 UNIT(S): 5000 INJECTION INTRAVENOUS; SUBCUTANEOUS at 14:38

## 2023-07-19 RX ADMIN — Medication 100 GRAM(S): at 14:38

## 2023-07-19 RX ADMIN — Medication 100 MILLIGRAM(S): at 14:37

## 2023-07-19 RX ADMIN — Medication 1 MILLIGRAM(S): at 14:37

## 2023-07-19 RX ADMIN — Medication 1 TABLET(S): at 14:37

## 2023-07-19 NOTE — PROGRESS NOTE ADULT - PROBLEM SELECTOR PLAN 3
-trend hgb  -B12, folate, iron  -retic, LDH, hapto  -supplement with folic acid daily Hgb stable at 12.1>12.0 with .5, consistent with macrocytic anemia, likely 2/2 alcohol use. Retic 3.0%, haptoglobin 105,  so low suspicion for hemolysis.  -f/u B12, folate, iron, ferritin  -c/w folic acid supplement daily Hgb stable at 12.1>12.0 with .5, consistent with macrocytic anemia, likely 2/2 alcohol use. Retic 3.0%, haptoglobin 105,  so low suspicion for hemolysis.  -f/u B12, folate, ferritin  -c/w folic acid supplement daily Hgb stable at 12.1>12.0 with .5, consistent with macrocytic anemia, likely 2/2 alcohol use with b12/folate deficiencies. Hemolysis considered (( however less likely given Retic 3.0%, haptoglobin 105.  -f/u B12, folate, ferritin  -c/w folic acid supplement daily Hgb stable at 12.1>12.0 with .5, consistent with macrocytic anemia, likely 2/2 alcohol use with b12/folate deficiencies. Hemolytic anemia considered () however less likely given Retic 3.0%, haptoglobin 105.  -f/u B12, folate, ferritin  -c/w folic acid supplement daily

## 2023-07-19 NOTE — CONSULT NOTE ADULT - ASSESSMENT
#elevated liver enzyme workup  -hepatitis serologies  -MARTA, dsDNA, anti-smooth muscle Ab, IgG level, anti-mitochondrial, anti LKM-1  -Iron studies  -Ceruloplasmin   58yo w/ PMHx AUD, remote hx DVT (not on AC, ?if provoked) presenting w/ jaundice and ascites in setting heavy alcohol use c/f alcoholic hepatitis +/- cirrhosis    1. Alcohol hepatitis  Suspect diagnosis of alcoholic hepatitis given significant ongoing alcohol use, juandice, AST >50, AST/ALT>1.5, total bili >3. Maddrey 27.4. Less likely ischemic hepatitis, DILI (no new drugs outside of zquil over the course of a week). UA unremarkable.  -Please complete infectious w/u w/ blood and urine cultures  -Once cultures sent, can start CTX 1g daily for 48 hours  -Can start NAC  -Ursodiol   -Hold steroids pending     # Alcoholic hepatitis  # Alcohol use disorder  Clinical presentation consistent with diagnosis of alcoholic hepatitis given ongoing alcohol use, jaundice, AST >50, AST:ALT >1.5, both ALT & AST <400 IU/L, and total bilirubin >3.0 mg/dL.    Recommendations:  -trend CBC, CMP, INR  -emphasize enteral nutrition  -check acute viral hepatitis panel and HIV  -complete infectious workup [CXR, blood cultures, urine culture, and diagnostic paracentesis with cell count and ascitic fluid culture if able]  -RUQ US and/or dedicated imaging to rule out alternative etiology for hyperbilirubinemia  -if no alternative contraindications [infection, bleeding, MARISOL, shock, or concomitant HBV/HCV/HIV/acute pancreatitis], may consider initiation of prednis(ol)one 40mg +/- NAC once infectious workup complete          ==================== REFERENCE ====================  Clinical diagnosis of AH:  -onset of jaundice within the last 8 weeks  -ongoing alcohol use [>60g/d in male, >40g/d in female] for >/= 6 months with <60 days abstinence prior to onset of jaundice  -AST >50, AST:ALT >1.5, and both ALT & AST <400 IU/L  -total bilirubin >3.0 mg/dL    DDx and confounding factors:  -Ischemic hepatitis [especially if GI bleeding present, hypotension, or recent cocaine use]  -DILI [suspected drug culprit within 30 days of onset of jaundice]  -Uncertain alcohol use  -Atypical laboratory features [AST <50, AST >400, AST:ALT <1.5, MARTA >1:160, SMA >1:80]    Contraindications for corticosteroids in AH:  -uncontrolled infection  -MARISOL with serum creatinine >2.5 mg/dL  -uncontrolled upper GI bleeding  -concomitant HBV, HCV, HIV, TB, HCC, DILI, or acute pancreatitis  -multisystem organ failure or shock    #elevated liver enzyme workup  -hepatitis serologies  -MARTA, dsDNA, anti-smooth muscle Ab, IgG level, anti-mitochondrial, anti LKM-1  -Iron studies  -Ceruloplasmin    -CT triple phase  -  -CTX for 48hous    -EGD inpatient    -urine Na  -Lasix 40, aldactone 100 58yo w/ PMHx AUD, remote hx DVT (not on AC, ?if provoked) presenting w/ jaundice and ascites in setting heavy alcohol use c/f alcoholic hepatitis +/- cirrhosis    1. C/f alcohol hepatitis  Suspect diagnosis of alcoholic hepatitis given significant ongoing alcohol use, juandice, AST >50, AST/ALT>1.5, total bili >3. Maddrey 27.4. Less likely ischemic hepatitis, DILI (no new drugs outside of zquil over the course of a week). UA unremarkable.  -Please complete infectious w/u w/ blood and urine cultures  -Once cultures sent, can start CTX 1g daily for 48 hours  -Please check HIV  -Can start NAC. Loading dose 150mg/kg followed by 100mg/kg/day in 1000ml of 5% glucose  -Ursodiol 500mg BID  -trend CBC, CMP, INR  -Alcohol cessation - pt feels he can quit easily and not currently interested in pharmacological assistance but may benefit from discussion on rehab/group programs  -CIWA scoring and symptom triggered management of withdrawal      2. Ascites  Most likely 2/2 cirrhosis (in setting AUD) based on hx and lab findings (low plts, albumin, elevated INR), though should be confirmed by imaging (pending CT A/P) and other etiologies of cirrhosis evaluated. Other possible etiology of ascites is clot (remote hx of DVT). Less likely 2/2 nephrosis vs. HF based on imaging here. Clinically is volume up w/ ?asterixis on exam though no overt signs of HE. Will need screening EGD.  -Please send MARTA, anti-smooth muscle Ab, IgG level, ceruloplasmin levels  -F/u ascitic fluid studies tomorrow pending tap  -F/u CT A/P w/ IVC - please make sure this is triple phase - will also evaluate for PVT vs. Budd Chiari  -Can start lasix 40mg, aldactone 100mg for diuresis  -Can give miralax to ensure BM and ongoing eval for HE- low threshold to start lactulose   -Will need screening EGD- likely while inpatient though will defer until infectious w/u complete  Note incomplete until finalized by attending signature/attestation.    Monica Hale  GI/Hepatology Fellow PGY5    NON-URGENT CONSULTS:  Please email [email or web address]  AT NIGHT AND ON WEEKENDS:  Available on [employer] Teams  [phone] ([place] Pager)    After 5pm, please contact the on-call GI fellow. [phone]-if no alternative contraindications (infectoin), may consider initiation of prednis(ol)one 40mg once infectious workup complete

## 2023-07-19 NOTE — PROGRESS NOTE ADULT - PROBLEM SELECTOR PLAN 10
DVT ppx: hold for tap    Health maintenance:  A1c, lipid panel    Diet: NPO for now    Dispo: AMIRAH on exam has multiple areas of excoriation and scabbing to all extremities. Pt reports he has intermittent diffuse skin pruritis, not experiencing at this time. Has tried multiple OTC creams to no effect. Pruritis likely 2/2 elevated bilirubin and liver dysfunction.     -start cholestyramine 4g daily  -monitor on exam has multiple areas of excoriation and scabbing to all extremities. Pt reports he has intermittent diffuse skin pruritis, not experiencing at this time. Has tried multiple OTC creams to no effect. Pruritis likely 2/2 elevated bilirubin and liver dysfunction.   -start cholestyramine 4g daily  -monitor QTc on admission  ms. Likely in setting of hypomagnesemia with serum Mg 1.1 on admission.   -avoid QT prolonging agents  -replete serum Mg with IV MgSO4 and recheck QTc with repeat EKG.

## 2023-07-19 NOTE — PROGRESS NOTE ADULT - PROBLEM SELECTOR PLAN 5
Pt w/ recent diarrhea, no N/V.    -c.diff   -isolation precautions  -GI PCR  -stool cx Pt w/ recent diarrhea, no N/V, likely in setting alcohol use and liver dysfunction. no recent abx use. Low suspicion for infection at this time, will r/o.     -c.diff   -isolation precautions  -GI PCR  -stool cx Pt w/ recent diarrhea, no N/V, likely in setting alcohol use and liver dysfunction. no recent abx use. Low suspicion for infection at this time, will r/o.     -C diff PCR, GI stool PCR, and stool cxs ordered  -f/u CTAP with IV contrast  -monitor stool count  -monitor electrolytes and replete PRN  -isolation precautions pending C diff.

## 2023-07-19 NOTE — PROGRESS NOTE ADULT - ATTENDING COMMENTS
57M with PMH of AUD, ?prior DVT no longer on AC who is presenting with increasing abdominal distention and b/l LE edema for past 1.5 weeks    # Alcohol Use Disorder   # Alcoholic Hepatitis   # Abdominal Ascites     Suspect liver failure from excessive alcohol use,  Budd Chiari syndrome, lower suspicion for malignant ascites or 2/2 acute decompensated heart failure.  US abdominal doppler and CT a/p triple phase ordered to further evaluate possible etiology of ascites. IPT and Hepatology consulted, planning paracentesis tomorrow. Start NAC per hepatology.

## 2023-07-19 NOTE — PROGRESS NOTE ADULT - PROBLEM SELECTOR PLAN 6
Likely from abdominal distention from ascites.    -vital signs q4  -pulse ox  -reevaluate following tap Likely from abdominal distention from ascites. Dyspnea has resolved as of 7/19.     -vital signs q4  -pulse ox  -reevaluate following tap Likely from abdominal distention from ascites. No respiratory sx as of 7/19. SpO2 consistently % on RA. also reports worsening leg swelling over the past 2 weeks. reports hx of DVT approximately 30 years ago. On exam has 2+ pitting edema to below the knee. Calves nontender however left calf is mildly firm, possibly due to skin changes. US duplex BLE showed L beltran's cyst but no DVT.  -vital signs q4  -pulse ox  -f/u TTE to r/o structural heart disease   -monitor following tap

## 2023-07-19 NOTE — PROGRESS NOTE ADULT - PROBLEM SELECTOR PLAN 1
Likely ascites iso chronic alcohol use disorder.    -placed procedure team order  -therapeutic tap w/ studies (saag, cell count diff, cx, total protein)  -pt currently w/o leukocytosis or metabolic acidosis unlikely SBP  -abdominal ultrasound  -CT abdomen  -thiamine 100 mg 7/18-7/22  -folic acid 1 mcg daily Likely ascites iso chronic alcohol use disorder. abdominal ultrasound showing ascites.     -placed procedure team order, paracentesis scheduled for 7/20  -f/u therapeutic tap w/ studies (saag, cell count diff, cx, total protein)  -pt currently w/o leukocytosis or metabolic acidosis unlikely SBP  -f/u CT abdomen/pelvis  -thiamine 100 mg 7/18-7/22  -folic acid 1 mcg daily Pt with 1.5 weeks of increasing abdominal distention and b/l LE edema. On exam. tense ascites appreciated. Concerning for ascites in setting of possible liver failure from alcohol/Tylenol use vs. Budd Chiari syndrome, less likely malignant ascites or from acute decompensated heart failure. Ascites confirmed on ultrasound    -placed procedure team order, paracentesis scheduled for 7/20  -f/u therapeutic tap w/ studies (saag, cell count diff, cx, total protein)  -pt currently w/o leukocytosis or metabolic acidosis unlikely SBP  -f/u CT abdomen/pelvis  -thiamine 100 mg 7/18-7/22  -folic acid 1 mcg daily Pt with 1.5 weeks of increasing abdominal distention and b/l LE edema. On exam. tense ascites appreciated. Concerning for ascites in setting of possible liver failure from alcohol/Tylenol use vs. Budd Chiari syndrome, less likely malignant ascites or from acute decompensated heart failure. Ascites confirmed on ultrasound. Low suspicion for SBP as pt w/o fevers, leukocytosis, or abdominal pain, will monitor off abx.    -GI/hepatology consulted appreciated recs  -placed procedure team order, paracentesis scheduled for 7/20  -f/u therapeutic tap w/ studies (saag, cell count diff, cx, total protein)  -pt currently w/o leukocytosis or metabolic acidosis unlikely SBP  -f/u CT abdomen/pelvis  -thiamine 100 mg 7/18-7/22  -folic acid 1 mcg daily Pt with 1.5 weeks of increasing abdominal distention and b/l LE edema. On exam. tense ascites appreciated. Concerning for ascites in setting of possible liver failure from alcohol use vs. Budd Chiari syndrome, less likely malignant ascites or from acute decompensated heart failure. Ascites confirmed on ultrasound. Low suspicion for SBP as pt w/o fevers, leukocytosis, or abdominal pain, will monitor off abx.    -GI/hepatology consulted appreciated recs  -placed procedure team order, paracentesis scheduled for 7/20  -f/u therapeutic tap w/ studies (saag, cell count diff, cx, total protein)  -f/u CT abdomen/pelvis  -serial abdominal exams Pt with 1.5 weeks of increasing abdominal distention and b/l LE edema. On exam. tense ascites appreciated. Concerning for ascites in setting of possible liver failure from alcohol use vs. Budd Chiari syndrome, less likely malignant ascites or from acute decompensated heart failure. Ascites confirmed on ultrasound. Low suspicion for SBP as pt w/o fevers, leukocytosis, or abdominal pain, will monitor off abx.    -GI/hepatology consulted appreciated recs  -placed procedure team order, paracentesis scheduled for 7/20  -repeat CBC/PTT/PT/INR/CMP in AM before tap  -f/u therapeutic tap w/ studies (saag, cell count diff, cx, total protein)  -f/u CT abdomen/pelvis  -serial abdominal exams

## 2023-07-19 NOTE — CHART NOTE - NSCHARTNOTEFT_GEN_A_CORE
Invasive Procedure Team (IPT) consulted for diagnostic/therapeutic paracentesis.    Indications for Diagnostic Paracentesis:  - Consistent with specialty society guidelines, ruling out Bacterial Peritonitis (in particular, SBP) in any admitted patient with ascites (even if asymptomatic), particularly in those with c/f sepsis  - Identifying etiology for new-onset ascites    Indications for Therapeutic Paracentesis:  - Tense ascites associated with abdominal discomfort, urinary retention, respiratory distress, or other evidence of end-organ compromise that is likely 2/2 the ascites itself (and not another etiology)  - Large ascites refractory to medical management (i.e. diuretics)    Absolute Contraindications:  - DIC  - Acute Abdomen  - Cellulitis    Relative Contraindications:  - Coagulopathy (INR > 2.0 per [facility] IPT policy, but specific to performing proceduralist; refer to final recommendations below)  - Severe Thrombocytopenia (Platelet < 20-50, discuss with team/refer to final recommendations below)  - Pregnancy (discuss with team/refer to final recommendations below)  - Cellulitis/Surgical Scarring/Vasculature or Hematoma overlying only safe bedside access site  - MARISOL (specifically for therapeutic paracteneses; related to already poor intravascular perfusion. Risk of MARISOL increases by 1.5x per 1L of ascites fluid removed).    ASSESSMENT/RECOMMENDATIONS  - Please obtain CBC/PTT/PT/INR/BMP for Cr on morning of procedure  - Please order any requested diagnostic studies on morning of procedure & notify procedure team if cytology requested.  - Primary team will have to physically deliver samples to lab; cannot be sent through pneumatic tube  - Can generally continue DVT/VTE PPx  ---- If pt has DVT or requires therapeutic AC, hold heparin gtt at 5-6am (4hrs prior to procedure)  - Pt does NOT need to be NPO  - Plan for procedure 7/19 if back from CT scan & can be screened or 7/20am  ---- Repeat PT/PTT/INR 7/20am  - If repeat therapeutic paracentesis requested for severe / refractory ascites in < 7 day intervals, should consider IR consultation for indwelling catheter placement.    Skyler Burns MD PGY-3  Case D/W Dr. Mcgowan Invasive Procedure Team (IPT) consulted for diagnostic/therapeutic paracentesis.    Indications for Diagnostic Paracentesis:  - Consistent with specialty society guidelines, ruling out Bacterial Peritonitis (in particular, SBP) in any admitted patient with ascites (even if asymptomatic), particularly in those with c/f sepsis  - Identifying etiology for new-onset ascites    Indications for Therapeutic Paracentesis:  - Tense ascites associated with abdominal discomfort, urinary retention, respiratory distress, or other evidence of end-organ compromise that is likely 2/2 the ascites itself (and not another etiology)  - Large ascites refractory to medical management (i.e. diuretics)    Absolute Contraindications:  - DIC  - Acute Abdomen  - Cellulitis    Relative Contraindications:  - Coagulopathy (INR > 2.0 per [facility] IPT policy, but specific to performing proceduralist; refer to final recommendations below)  - Severe Thrombocytopenia (Platelet < 20-50, discuss with team/refer to final recommendations below)  - Pregnancy (discuss with team/refer to final recommendations below)  - Cellulitis/Surgical Scarring/Vasculature or Hematoma overlying only safe bedside access site  - MARISOL (specifically for therapeutic paracteneses; related to already poor intravascular perfusion. Risk of MARISOL increases by 1.5x per 1L of ascites fluid removed).    ASSESSMENT/RECOMMENDATIONS  57M with severe AUD likely c/b alcoholic cirrhosis, though no imaging to reflect that. Possibly now with alcoholic hepatitis. Abdominal US reviewed, though not formally read yet, showing sufficient pocket for paracentesis. Will have to screen at bedside to confirm, but should have at minimum a diagnostic tap to r/o SBP in this context.   - Will plan for procedure 7/20am  - Please obtain CBC/PTT/PT/INR/BMP for Cr 7/20am  - Please order any requested diagnostic studies on morning of procedure & notify procedure team directly if cytology requested  - Primary team will have to physically deliver samples to lab; cannot be sent through pneumatic tube    - Can generally continue DVT/VTE PPx  ---- If pt has DVT or requires therapeutic AC, hold heparin gtt at 5-6am (4hrs prior to procedure)  - Pt does NOT need to be NPO    - If repeat therapeutic paracentesis requested for severe / refractory ascites in < 7 day intervals, should consider IR consultation for indwelling catheter placement.    Skyler Burns MD PGY-3  Case D/W Dr. Mcgowan Invasive Procedure Team (IPT) consulted for diagnostic/therapeutic paracentesis.    Indications for Diagnostic Paracentesis:  - Consistent with specialty society guidelines, ruling out Bacterial Peritonitis (in particular, SBP) in any admitted patient with ascites (even if asymptomatic), particularly in those with c/f sepsis  - Identifying etiology for new-onset ascites    Indications for Therapeutic Paracentesis:  - Tense ascites associated with abdominal discomfort, urinary retention, respiratory distress, or other evidence of end-organ compromise that is likely 2/2 the ascites itself (and not another etiology)  - Large ascites refractory to medical management (i.e. diuretics)    Absolute Contraindications:  - DIC  - Acute Abdomen  - Cellulitis    Relative Contraindications:  - Coagulopathy (INR > 2.0 per [facility] IPT policy, but specific to performing proceduralist; refer to final recommendations below)  - Severe Thrombocytopenia (Platelet < 20-50, discuss with team/refer to final recommendations below)  - Pregnancy (discuss with team/refer to final recommendations below)  - Cellulitis/Surgical Scarring/Vasculature or Hematoma overlying only safe bedside access site  - MARISOL (specifically for therapeutic paracteneses; related to already poor intravascular perfusion. Risk of MARISOL increases by 1.5x per 1L of ascites fluid removed).    ASSESSMENT/RECOMMENDATIONS  57M with severe AUD likely c/b alcoholic cirrhosis, though no imaging to reflect that. Possibly now with alcoholic hepatitis. Abdominal US reviewed, though not formally read yet, showing sufficient pocket for paracentesis. Will have to screen at bedside to confirm, but should have at minimum a diagnostic tap to r/o SBP in this context.   - Will plan for procedure 7/20am (pt at CT / US 7/19am after several visits to bedside)  - Please obtain CBC/PTT/PT/INR/BMP for Cr 7/20am  - Please order any requested diagnostic studies on morning of procedure & notify procedure team directly if cytology requested  - Primary team will have to physically deliver samples to lab; cannot be sent through pneumatic tube    - Can generally continue DVT/VTE PPx  ---- If pt has DVT or requires therapeutic AC, hold heparin gtt at 5-6am (4hrs prior to procedure)  - Pt does NOT need to be NPO    - If repeat therapeutic paracentesis requested for severe / refractory ascites in < 7 day intervals, should consider IR consultation for indwelling catheter placement.    Skyler Burns MD PGY-3  Case D/W Dr. Mcgowan

## 2023-07-19 NOTE — SBIRT NOTE ADULT - NSSBIRTALCPOSREINDET_GEN_A_CORE
SW met with patient at bedside to discuss  alcohol use. Patient reports that he drinks x4 per week with around 1-5 drinks in each sitting. SW and patient discussed alcohol use and patient acknowledged that he is above the recommended limit. SW offered and patient declined SBIRT resources. SW supported and apprised patient of SW availability. SW to follow as needed.

## 2023-07-19 NOTE — PROGRESS NOTE ADULT - PROBLEM SELECTOR PLAN 7
Likely 2/2 cirrhosis  -trend plts  -transfuse <10 Likely 2/2 cirrhosis. 48k as of 7/19  -trend plts  -transfuse <10, <20k and febrile, and <50k w/ active bleeding Plt count 58>44. Also with elevated PT/PTT, elevated D-dimer, decreased fibrinogen. Suspect coagulopathies from bone marrow suppression in setting of chronic alcohol use and possibly from liver failure. Low suspicion for TTP given lack of fevers, renal failure, and AMS. DIC also considered but unlikely given normal haptoglobin     -monitor plt  -check coag, D-dimer, and fibrinogen to r/o DIC.  -transfuse <10, <20k and febrile, and <50k w/ active bleeding Plt count 58>44. Also with elevated PT/PTT, elevated D-dimer, decreased fibrinogen. Suspect coagulopathies from bone marrow suppression in setting of chronic alcohol use and possibly from liver failure. Low suspicion for TTP given lack of fevers, renal failure, and AMS. DIC also considered but unlikely given normal haptoglobin    -monitor plt  -check coag, D-dimer, and fibrinogen to r/o DIC.  -transfuse <10, <20k and febrile, and <50k w/ active bleeding

## 2023-07-19 NOTE — CONSULT NOTE ADULT - SUBJECTIVE AND OBJECTIVE BOX
[facility]    Patient is a 57y old  Male w/ PMHx DVT not on AC who presents with a chief complaint of abdominal swelling (19 Jul 2023 07:47)    HPI:  56yo w/ PMHx AUD (3-5 double [place] whiskey 5days/week for 25 years), DVT (not on AC) who presented w/ worsening abdominal distension and b/l LE edema x11 days.    In ED: /70-90, HR 90s. Labs notable for Hb 12 w/ MCV >100, plts 44, INR 1.5. MELD 22, Maddrey 27.4. Acetominophen level <10, alcohol level 35. Abdominal U/S showed large ascites, TTE only w/ mild diastolic dysfunction.             ASA/NSAIDs/anticoagulation: ***    VS: ***    Labs/Imaging reviewed.  CBC: 7>12<44, , b/l Hb no prior  BUN/Cr: 8/0.9  ALT/AST 83/16    T/Dbili 7.6/3  Alb: 3  INR: 1.5    Iron studies:   Fe: 161  TIBC: <191  %Tfr<30  Ferritin: No result    Hepatitis serologies:  Hep B SAg: NR  Hep B SAb: NR  Hep C SAb: Nr    MELD-Na: 22  Maddrey: 27.4      Imaging:  < from: Xray Chest 1 View AP/PA (07.18.23 @ 15:30) >  IMPRESSION:  Clear lungs.    < end of copied text >  < from: US Abdomen Limited (07.19.23 @ 08:47) >  IMPRESSION:  Large volume ascites in the bilateral lower and right upper quadrant,   largest pocket in the right lower quadrant.    < end of copied text >  < from: US Duplex Venous Lower Ext Complete, Bilateral (07.19.23 @ 10:48) >    IMPRESSION:  No evidence of deep venous thrombosis in either lower extremity.  Left Baker's cyst cyst.    < end of copied text >  < from: Transthoracic Echocardiogram (07.19.23 @ 13:30) >  CONCLUSIONS:  1. Endocardium not well visualized; grossly normal left  ventricular systolic function. Endocardial visualization  enhanced with intravenous injection ofecho contrast  (Definity).  2. Mild diastolic dysfunction (Stage I).  3. Normal right ventricular size and function.  *** No previous Echo exam.    < end of copied text >            no EGD/colo in chart      SocHx:  -no tobacco, ETOH, other drug use***    PMH/PSH:  PAST MEDICAL & SURGICAL HISTORY:  ETOH abuse      S/P left knee surgery        FH:  FAMILY HISTORY:  FHx: myocardial infarction (Father)        MEDS:  MEDICATIONS  (STANDING):  cholestyramine Powder (Sugar-Free) 4 Gram(s) Oral daily  folic acid 1 milliGRAM(s) Oral daily  heparin   Injectable 5000 Unit(s) SubCutaneous every 8 hours  multivitamin 1 Tablet(s) Oral daily  thiamine 100 milliGRAM(s) Oral daily    MEDICATIONS  (PRN):  acetaminophen     Tablet .. 650 milliGRAM(s) Oral every 6 hours PRN Temp greater or equal to 38C (100.4F), Mild Pain (1 - 3)  LORazepam     Tablet 2 milliGRAM(s) Oral every 2 hours PRN CIWA-Ar score increase by 2 points and a total score of 7 or less  LORazepam   Injectable 2 milliGRAM(s) IV Push every 1 hour PRN CIWA-Ar score 8 or greater    Allergies    cortisone (Swelling; Rash)    Intolerances            CONSTITUTIONAL:  No weight loss, fever, chills, weakness or fatigue.  HEENT:  Eyes:  No visual loss, blurred vision, double vision or yellow sclerae. Ears, Nose, Throat:  No hearing loss, sneezing, congestion, runny nose or sore throat.  SKIN:  No rash or itching.  CARDIOVASCULAR:  No chest pain, chest pressure or chest discomfort. No palpitations or edema.  RESPIRATORY:  No shortness of breath, cough or sputum.  GASTROINTESTINAL:  SEE HPI  GENITOURINARY:  No dysuria, hematuria, urinary frequency  NEUROLOGICAL:  No headache, dizziness, syncope, paralysis, ataxia, numbness or tingling in the extremities. No change in bowel or bladder control.  MUSCULOSKELETAL:  No muscle, back pain, joint pain or stiffness.  HEMATOLOGIC:  No anemia, bleeding or bruising.  LYMPHATICS:  No enlarged nodes. No history of splenectomy.  PSYCHIATRIC:  No history of depression or anxiety.  ENDOCRINOLOGIC:  No reports of sweating, cold or heat intolerance. No polyuria or polydipsia.      ______________________________________________________________________  PHYSICAL EXAM:  T(C): 36.9 (07-19-23 @ 14:30), Max: 37.1 (07-19-23 @ 01:32)  HR: 98 (07-19-23 @ 14:30)  BP: 145/98 (07-19-23 @ 14:30)  RR: 17 (07-19-23 @ 14:30)  SpO2: 100% (07-19-23 @ 14:30)  Wt(kg): --    07-18 - 07-19  --------------------------------------------------------  IN:  Total IN: 0 mL    OUT:    Stool (mL): 2 mL  Total OUT: 2 mL    Total NET: -2 mL          GEN: NAD, normocephalic  CVS: S1S2+  CHEST: clear to auscultation  ABD: soft , nontender, nondistended, bowel sounds present  EXTR: no cyanosis, no clubbing, no edema  NEURO: Awake and alert; oriented .....  SKIN:  warm;  non icteric    ______________________________________________________________________  LABS:                        12.0   7.48  )-----------( 44       ( 19 Jul 2023 07:44 )             35.9     07-19    135  |  98  |  8   ----------------------------<  91  3.6   |  22  |  0.59    Ca    8.3<L>      19 Jul 2023 07:44  Phos  2.6     07-19  Mg     1.50     07-19    TPro  7.6  /  Alb  3.0<L>  /  TBili  9.0<H>  /  DBili  4.5<H>  /  AST  83<H>  /  ALT  16  /  AlkPhos  342<H>  07-19    LIVER FUNCTIONS - ( 19 Jul 2023 07:44 )  Alb: 3.0 g/dL / Pro: 7.6 g/dL / ALK PHOS: 342 U/L / ALT: 16 U/L / AST: 83 U/L / GGT: 431 U/L       PT/INR - ( 18 Jul 2023 14:15 )   PT: 17.5 sec;   INR: 1.50 ratio         PTT - ( 18 Jul 2023 14:15 )  PTT:45.8 sec  ____________________________________________        INIITIAL Hepatology CONSULTATION    Patient is a 57y old  Male w/ PMHx DVT not on AC who presents with a chief complaint of abdominal swelling (19 Jul 2023 07:47)    HPI:  56yo w/ PMHx AUD (3-5 double [place] whiskey 5days/week for 25 years), DVT (not on AC) who presented w/ worsening abdominal distension and b/l LE edema x11 days, found to have ascites and jaundice. Hepatology being consulted due to ascites and concern for alcoholic cirrhosis.    Pt reports he was in [facility] until a little over a week ago when he noticed LE edema that progressed to his abdomen. Had enlarging abdomen as well as yellowing of his eyes and worsening SOB when walking across the room which concerned his kids and he decided to come in. No similar sxs in past. Has heavy alcohol use hx- reports he is a  and goes to the pub almost every day after work where he drinks up to 5 shots of whiskey. No hx of withdrawal and has never felt drinking was an issue for him. Never tried any meds in past to help quit. Denies any known hx of liver disease or family hx of liver disease. No home meds and only new med has been some zquil which he finished over the course of a week. No OTC supplements. Denies any weight loss, fevers/chills, kidney or heart issues in past. No EGD/[place] on record.    In ED: /70-90, HR 90s. Labs notable for Hb 12 w/ MCV >100, plts 44, INR 1.5. MELD 22, Maddrey 27.4. Acetominophen level <10, alcohol level 35. Abdominal U/S showed large ascites, TTE only w/ mild diastolic dysfunction.             ASA/NSAIDs/anticoagulation: None    VS: /70-90s, HR 90s    Labs/Imaging reviewed.  CBC: 7>12<44, , b/l Hb no prior  BUN/Cr: 8/0.9  ALT/AST 83/16    T/Dbili 7.6/3  Alb: 3  INR: 1.5    Iron studies:   Fe: 161  TIBC: <191  %Tfr<30  Ferritin: No result    Hepatitis serologies:  Hep B SAg: NR  Hep B SAb: NR  Hep C SAb: Nr    MELD-Na: 22  Maddrey: 27.4      Imaging:  < from: Xray Chest 1 View AP/PA (07.18.23 @ 15:30) >  IMPRESSION:  Clear lungs.    < end of copied text >  < from: US Abdomen Limited (07.19.23 @ 08:47) >  IMPRESSION:  Large volume ascites in the bilateral lower and right upper quadrant,   largest pocket in the right lower quadrant.    < end of copied text >  < from: US Duplex Venous Lower Ext Complete, Bilateral (07.19.23 @ 10:48) >    IMPRESSION:  No evidence of deep venous thrombosis in either lower extremity.  Left Baker's cyst cyst.    < end of copied text >  < from: Transthoracic Echocardiogram (07.19.23 @ 13:30) >  CONCLUSIONS:  1. Endocardium not well visualized; grossly normal left  ventricular systolic function. Endocardial visualization  enhanced with intravenous injection ofecho contrast  (Definity).  2. Mild diastolic dysfunction (Stage I).  3. Normal right ventricular size and function.  *** No previous Echo exam.    < end of copied text >            no EGD/colo in chart      SocHx:  -no tobacco, ETOH, other drug use***    PMH/PSH:  PAST MEDICAL & SURGICAL HISTORY:  ETOH abuse      S/P left knee surgery        FH:  FAMILY HISTORY:  FHx: myocardial infarction (Father)        MEDS:  MEDICATIONS  (STANDING):  cholestyramine Powder (Sugar-Free) 4 Gram(s) Oral daily  folic acid 1 milliGRAM(s) Oral daily  heparin   Injectable 5000 Unit(s) SubCutaneous every 8 hours  multivitamin 1 Tablet(s) Oral daily  thiamine 100 milliGRAM(s) Oral daily    MEDICATIONS  (PRN):  acetaminophen     Tablet .. 650 milliGRAM(s) Oral every 6 hours PRN Temp greater or equal to 38C (100.4F), Mild Pain (1 - 3)  LORazepam     Tablet 2 milliGRAM(s) Oral every 2 hours PRN CIWA-Ar score increase by 2 points and a total score of 7 or less  LORazepam   Injectable 2 milliGRAM(s) IV Push every 1 hour PRN CIWA-Ar score 8 or greater    Allergies    cortisone (Swelling; Rash)    Intolerances            CONSTITUTIONAL:  No weight loss, fever, chills, weakness or fatigue.  HEENT:  Eyes:  No visual loss, blurred vision, double vision or yellow sclerae. Ears, Nose, Throat:  No hearing loss, sneezing, congestion, runny nose or sore throat.  SKIN:  No rash or itching.  CARDIOVASCULAR:  No chest pain, chest pressure or chest discomfort. No palpitations or edema.  RESPIRATORY:  No shortness of breath, cough or sputum.  GASTROINTESTINAL:  SEE HPI  GENITOURINARY:  No dysuria, hematuria, urinary frequency  NEUROLOGICAL:  No headache, dizziness, syncope, paralysis, ataxia, numbness or tingling in the extremities. No change in bowel or bladder control.  MUSCULOSKELETAL:  No muscle, back pain, joint pain or stiffness.  HEMATOLOGIC:  No anemia, bleeding or bruising.  LYMPHATICS:  No enlarged nodes. No history of splenectomy.  PSYCHIATRIC:  No history of depression or anxiety.  ENDOCRINOLOGIC:  No reports of sweating, cold or heat intolerance. No polyuria or polydipsia.      ______________________________________________________________________  PHYSICAL EXAM:  T(C): 36.9 (07-19-23 @ 14:30), Max: 37.1 (07-19-23 @ 01:32)  HR: 98 (07-19-23 @ 14:30)  BP: 145/98 (07-19-23 @ 14:30)  RR: 17 (07-19-23 @ 14:30)  SpO2: 100% (07-19-23 @ 14:30)  Wt(kg): --    07-18 - 07-19  --------------------------------------------------------  IN:  Total IN: 0 mL    OUT:    Stool (mL): 2 mL  Total OUT: 2 mL    Total NET: -2 mL          GEN: Diffusely jaundiced, non toxic appearing, conversational and pleasant  CVS: S1S2+  CHEST: clear to auscultation  ABD: Tense ascites w/ reducible umbilical hernia, non tender  EXTR: 2+ pitting edema up to thighs w/ skin scabbing and scarring (picks at his legs per patient)  NEURO: Awake and alert; oriented x3, some subtle asterixis  SKIN:  warm;  +spider angiomatas, no palmar erythema     ______________________________________________________________________  LABS:                        12.0   7.48  )-----------( 44       ( 19 Jul 2023 07:44 )             35.9     07-19    135  |  98  |  8   ----------------------------<  91  3.6   |  22  |  0.59    Ca    8.3<L>      19 Jul 2023 07:44  Phos  2.6     07-19  Mg     1.50     07-19    TPro  7.6  /  Alb  3.0<L>  /  TBili  9.0<H>  /  DBili  4.5<H>  /  AST  83<H>  /  ALT  16  /  AlkPhos  342<H>  07-19    LIVER FUNCTIONS - ( 19 Jul 2023 07:44 )  Alb: 3.0 g/dL / Pro: 7.6 g/dL / ALK PHOS: 342 U/L / ALT: 16 U/L / AST: 83 U/L / GGT: 431 U/L       PT/INR - ( 18 Jul 2023 14:15 )   PT: 17.5 sec;   INR: 1.50 ratio         PTT - ( 18 Jul 2023 14:15 )  PTT:45.8 sec  ____________________________________________

## 2023-07-19 NOTE — PROGRESS NOTE ADULT - SUBJECTIVE AND OBJECTIVE BOX
Emerson Ocasio MD  PGY 1 Department of Internal Medicine        Patient is a 57y old  Male who presents with a chief complaint of abdominal swelling (2023 20:52)      SUBJECTIVE / OVERNIGHT EVENTS: Pt seen and examined. No acute overnight events. Denies fevers, chills, CP, SOB, Abdominal pain, N/V, Constipation, Diarrhea        MEDICATIONS  (STANDING):  folic acid 1 milliGRAM(s) Oral daily  multivitamin 1 Tablet(s) Oral daily  thiamine 100 milliGRAM(s) Oral daily    MEDICATIONS  (PRN):  acetaminophen     Tablet .. 650 milliGRAM(s) Oral every 6 hours PRN Temp greater or equal to 38C (100.4F), Mild Pain (1 - 3)  LORazepam     Tablet 2 milliGRAM(s) Oral every 2 hours PRN CIWA-Ar score increase by 2 points and a total score of 7 or less  LORazepam   Injectable 2 milliGRAM(s) IV Push every 1 hour PRN CIWA-Ar score 8 or greater      I&O's Summary    2023 07:01  -  2023 07:00  --------------------------------------------------------  IN: 0 mL / OUT: 2 mL / NET: -2 mL        Vital Signs Last 24 Hrs  T(C): 36.8 (2023 06:35), Max: 37.1 (2023 01:32)  T(F): 98.3 (2023 06:35), Max: 98.8 (2023 01:32)  HR: 92 (2023 06:35) (92 - 106)  BP: 135/88 (2023 06:35) (128/76 - 180/120)  BP(mean): 91 (2023 21:11) (91 - 91)  RR: 18 (2023 06:35) (12 - 20)  SpO2: 98% (2023 06:35) (96% - 99%)    Parameters below as of 2023 06:35  Patient On (Oxygen Delivery Method): room air        CAPILLARY BLOOD GLUCOSE          PHYSICAL EXAM:  GENERAL: NAD,   HEAD:  Atraumatic, Normocephalic  EYES: EOMI, PERRL, conjunctiva and sclera clear  NECK: No JVD  CHEST/LUNG: Clear to auscultation bilaterally; No wheeze  HEART: Regular rate and rhythm; No murmurs, rubs, or gallops  ABDOMEN: Soft, Nontender, Nondistended; Bowel sounds present  EXTREMITIES:  2+ Peripheral Pulses, No clubbing, cyanosis, or edema  PSYCH: AAOx3  NEUROLOGY: non-focal  SKIN: No rashes or lesions       LABS:                        12.1   8.86  )-----------( 58       ( 2023 14:15 )             36.3     Auto Eosinophil # 0.05  / Auto Eosinophil % 0.6   / Auto Neutrophil # 7.45  / Auto Neutrophil % 84.0  / BANDS % x            136  |  97<L>  |  5<L>  ----------------------------<  87  3.6   |  23  |  0.61    Ca    8.3<L>      2023 14:34  Mg     1.10       TPro  7.9  /  Alb  3.4  /  TBili  7.3<H>  /  DBili  x   /  AST  91<H>  /  ALT  18  /  AlkPhos  388<H>      PT/INR - ( 2023 14:15 )   PT: 17.5 sec;   INR: 1.50 ratio         PTT - ( 2023 14:15 )  PTT:45.8 sec      Urinalysis Basic - ( 2023 16:42 )    Color: Orange / Appearance: Cloudy / S.024 / pH: x  Gluc: x / Ketone: Negative mg/dL  / Bili: Large / Urobili: 1.0 mg/dL   Blood: x / Protein: 30 mg/dL / Nitrite: Positive   Leuk Esterase: Small / RBC: 6 /HPF / WBC 1 /HPF   Sq Epi: x / Non Sq Epi: 3 /HPF / Bacteria: Negative /HPF            RADIOLOGY & ADDITIONAL TESTS:    Imaging Personally Reviewed:    Consultant(s) Notes Reviewed:      Care Discussed with Consultants/Other Providers:   Emerson Ocasio MD  PGY 1 Department of Internal Medicine        Patient is a 57y old  Male who presents with a chief complaint of abdominal swelling (2023 20:52)      SUBJECTIVE / OVERNIGHT EVENTS: Pt seen and examined. No acute overnight events. He has no complaints at this time, denies fevers, chills, CP, SOB, Abdominal pain, N/V, Constipation, Diarrhea        MEDICATIONS  (STANDING):  folic acid 1 milliGRAM(s) Oral daily  multivitamin 1 Tablet(s) Oral daily  thiamine 100 milliGRAM(s) Oral daily    MEDICATIONS  (PRN):  acetaminophen     Tablet .. 650 milliGRAM(s) Oral every 6 hours PRN Temp greater or equal to 38C (100.4F), Mild Pain (1 - 3)  LORazepam     Tablet 2 milliGRAM(s) Oral every 2 hours PRN CIWA-Ar score increase by 2 points and a total score of 7 or less  LORazepam   Injectable 2 milliGRAM(s) IV Push every 1 hour PRN CIWA-Ar score 8 or greater      I&O's Summary    2023 07:01  -  2023 07:00  --------------------------------------------------------  IN: 0 mL / OUT: 2 mL / NET: -2 mL        Vital Signs Last 24 Hrs  T(C): 36.8 (2023 06:35), Max: 37.1 (2023 01:32)  T(F): 98.3 (2023 06:35), Max: 98.8 (2023 01:32)  HR: 92 (2023 06:35) (92 - 106)  BP: 135/88 (2023 06:35) (128/76 - 180/120)  BP(mean): 91 (2023 21:11) (91 - 91)  RR: 18 (2023 06:35) (12 - 20)  SpO2: 98% (2023 06:35) (96% - 99%)    Parameters below as of 2023 06:35  Patient On (Oxygen Delivery Method): room air        CAPILLARY BLOOD GLUCOSE          PHYSICAL EXAM:  GENERAL: NAD,  resting comfortably  HEAD:  Atraumatic, Normocephalic  EYES: EOMI, PERRL, conjunctiva nl, scleral icterus bilaterally  NECK: No JVD  CHEST/LUNG: Clear to auscultation bilaterally; No wheeze  HEART: Regular rate and rhythm; No murmurs, rubs, or gallops  ABDOMEN: Soft, Nontender, significant distension with fluid wave; Bowel sounds present  EXTREMITIES:  2+ Peripheral Pulses, 2+ pitting edema to lower extremities, No clubbing, cyanosis. Calves are non tender and without erythema or warmth. Left calf mildly firm  PSYCH: AAOx3  NEUROLOGY: non-focal, moving all extremities, cranial nerves intact  SKIN: Multiple areas of excoriations to extremities with scabbing       LABS:                        12.1   8.86  )-----------( 58       ( 2023 14:15 )             36.3     Auto Eosinophil # 0.05  / Auto Eosinophil % 0.6   / Auto Neutrophil # 7.45  / Auto Neutrophil % 84.0  / BANDS % x            136  |  97<L>  |  5<L>  ----------------------------<  87  3.6   |  23  |  0.61    Ca    8.3<L>      2023 14:34  Mg     1.10       TPro  7.9  /  Alb  3.4  /  TBili  7.3<H>  /  DBili  x   /  AST  91<H>  /  ALT  18  /  AlkPhos  388<H>      PT/INR - ( 2023 14:15 )   PT: 17.5 sec;   INR: 1.50 ratio         PTT - ( 2023 14:15 )  PTT:45.8 sec      Urinalysis Basic - ( 2023 16:42 )    Color: Orange / Appearance: Cloudy / S.024 / pH: x  Gluc: x / Ketone: Negative mg/dL  / Bili: Large / Urobili: 1.0 mg/dL   Blood: x / Protein: 30 mg/dL / Nitrite: Positive   Leuk Esterase: Small / RBC: 6 /HPF / WBC 1 /HPF   Sq Epi: x / Non Sq Epi: 3 /HPF / Bacteria: Negative /HPF            RADIOLOGY & ADDITIONAL TESTS:    Imaging Personally Reviewed:    Consultant(s) Notes Reviewed:      Care Discussed with Consultants/Other Providers:

## 2023-07-19 NOTE — PROGRESS NOTE ADULT - PROBLEM SELECTOR PLAN 9
UA with nitrites and LE although contaminated.    -repeat UA reports worsening leg swelling over the past 2 weeks. reports hx of DVT approximately 30 years ago. On exam has 2+ pitting edema to below the knee. Calves nontender however left calf is mildly firm, possibly due to skin changes. US duplex BLE showed L beltran's cyst but no DVT.    -f/u TTE for alcohol cardiomyopathy reports worsening leg swelling over the past 2 weeks. reports hx of DVT approximately 30 years ago. On exam has 2+ pitting edema to below the knee. Calves nontender however left calf is mildly firm, possibly due to skin changes. US duplex BLE showed L beltran's cyst but no DVT.  -f/u TTE for alcohol cardiomyopathy Lipid panel with . ASCVD 10yr risk 13%. Would benefit from starting moderate intensity statin, will also start cholestyramine for pruritic rash likely 2/2 bilirubin.  -start 4g cholestyramine daily  -will discuss starting moderate intensity statin

## 2023-07-19 NOTE — PROGRESS NOTE ADULT - PROBLEM SELECTOR PLAN 4
Pt drinks 3-5 "double glass" of [place] whiskey after work for the past 25 years. Denied hx of withdrawal. Last drink: a week ago, however pt w/ elevated alcohol blood level.    -s/p librium x1  -sx triggered ciwa  -seizure precautions  -aspiration precautions  -fall risk  -SW consult Pt drinks 3-5 "double glass" of [place] whiskey after work for the past 25 years. Denied hx of withdrawal. Last drink: a week ago, however pt w/ elevated alcohol blood level.     -s/p librium x1  -sx triggered ciwa  -seizure precautions  -aspiration precautions  -fall risk  -SW consult Pt drinks 3-5 "double glass" of [place] whiskey after work for the past 25 years. Denies hx of withdrawal syndrome. Last drink a week ago, but pt with elevated blood alcohol level. On exam, no tongue fasciculations or tremors.    -s/p librium x1  -start symptom-triggered CIWA with Ativan for now  -thiamine 100 mg 7/18-7/22  -folic acid 1 mcg daily  -aspiration precautions  -seizure precautions  -fall risk protocol  -SW consult.

## 2023-07-19 NOTE — PROGRESS NOTE ADULT - ASSESSMENT
56 yo M with PMHx of EtOH abuse (3-5 double nayana whiskey 5 days a week x25 years) and poor follow up with physicians presents for swelling of abdomen and legs for past 10 days, likely ascites iso alcoholic cirrhosis.  56 yo M with PMHx of EtOH abuse (3-5 double nayana whiskey 5 days a week x25 years) and DVT presents for swelling of abdomen and legs for past 10 days, likely ascites 2/2 alcoholic cirrhosis.

## 2023-07-19 NOTE — PROGRESS NOTE ADULT - PROBLEM SELECTOR PLAN 11
Lipid panel with . Would benefit from starting moderate intensity statin, will also start cholestyramine for pruritic rash likely 2/2 bilirubin.  -start 4g cholestyramine daily  -discuss starting moderate intensity statin Lipid panel with . ASCVD 10yr risk 13%. Would benefit from starting moderate intensity statin, will also start cholestyramine for pruritic rash likely 2/2 bilirubin.  -start 4g cholestyramine daily  -will discuss starting moderate intensity statin potassium 3.6, mag 1.5, and phos 2.6 on admission. Likely in setting of chronic alcohol use.   -Goal Mag 2.0, Phos 3.0, Potassium 4.0  -will replete as needed potassium 3.6, mag 1.1>1.5, and phos 2.6 on admission. Likely in setting of chronic alcohol use.   -Goal Mag 2.0, Phos 3.0, Potassium 4.0  -will replete as needed

## 2023-07-19 NOTE — PROGRESS NOTE ADULT - PROBLEM SELECTOR PLAN 12
diet: regular  dvt ppx: heparin  code: full  dispo: pending clinical improvement diet: regular  dvt ppx: heparin  dispo: pending clinical improvement  code: full

## 2023-07-19 NOTE — CONSULT NOTE ADULT - ATTENDING COMMENTS
57-year-old male with a history of alcohol use disorder and a remote history of deep vein thrombosis (DVT) is presenting with jaundice and ascites, suggesting alcoholic hepatitis with possible cirrhosis.    Recommendations:  -Initiate infectious workup with blood and urine cultures. Once cultures are sent, consider starting ceftriaxone (CTX) 1g daily for 48 hours.  -Begin N-acetylcysteine (NAC) with a loading dose of 150mg/kg followed by 100mg/kg/day in 1000ml of 5% glucose. Start ursodiol 500mg BID.  -Monitor complete blood count (CBC), comprehensive metabolic panel (CMP), and international normalized ratio (INR) trends.  -Encourage alcohol cessation and implement CIWA scoring for symptom-triggered management of withdrawal.  -Follow up with ascitic fluid studies tomorrow after tapping the ascites.  -Schedule a CT abdomen/pelvis with intravenous contrast  -Start furosemide 40mg and spironolactone 100mg for diuresis.  -Screening esophagogastroduodenoscopy (EGD) to assess for esophageal varices (can be done as outpatient).

## 2023-07-19 NOTE — PROGRESS NOTE ADULT - PROBLEM SELECTOR PLAN 2
Pt with elevated alk phos and AST. Ddx: cholestatic/obstructive vs. hepatocellular injury    -trend LFTs  -f/u abdominal US  -MRCP for elevated TBili  -f/u dbili Pt with elevated alk phos and AST. Ddx: cholestatic/obstructive vs. hepatocellular injury    -Will consult GI/hepatology  -trend LFTs  -MRCP for elevated TBili  -f/u dbili Pt with elevated t bili to 7.3, alk phos 388, AST 91, and ALT 18. Elevated GGT consistent with cholestatic disease. Concerning for cholestasis/biliary obstruction with component of hepatocellular injury/hepatitis.     -appreciate GI/Hepatology recs  -trend LFTs  -f/u CT A/P  -MRCP for elevated TBili  -f/u dbili Pt with elevated t bili to 7.3, alk phos 388, AST 91, and ALT 18. Elevated GGT suggestive of cholestatic disease. Concerning for cholestasis/biliary obstruction with component of hepatocellular injury/hepatitis 2/2 alcohol use    -appreciate GI/Hepatology recs  -trend LFTs  -f/u CT A/P  -MRCP for elevated TBili  -f/u dbili

## 2023-07-19 NOTE — PROGRESS NOTE ADULT - PROBLEM SELECTOR PLAN 8
Pt w/ leg edema. No  prior cardiac hx    -TTE r/o alcoholic cardiomyopathy UA with nitrites and LE although contaminated. Denies dysuria, hematuria, change in urinary frequency. Repeat UA unremarkable  -Monitor for signs of infection on exam has multiple areas of excoriation and scabbing to all extremities. Pt reports he has intermittent diffuse skin pruritis, not experiencing at this time. Has tried multiple OTC creams to no effect. Pruritis likely 2/2 elevated bilirubin and liver dysfunction.   -start cholestyramine 4g daily  -monitor

## 2023-07-20 ENCOUNTER — TRANSCRIPTION ENCOUNTER (OUTPATIENT)
Age: 58
End: 2023-07-20

## 2023-07-20 LAB
ALBUMIN FLD-MCNC: 1 G/DL — SIGNIFICANT CHANGE UP
ALBUMIN SERPL ELPH-MCNC: 2.9 G/DL — LOW (ref 3.3–5)
ALP SERPL-CCNC: 327 U/L — HIGH (ref 40–120)
ALT FLD-CCNC: 17 U/L — SIGNIFICANT CHANGE UP (ref 4–41)
ANION GAP SERPL CALC-SCNC: 11 MMOL/L — SIGNIFICANT CHANGE UP (ref 7–14)
APTT BLD: 38.5 SEC — HIGH (ref 27–36.3)
AST SERPL-CCNC: 80 U/L — HIGH (ref 4–40)
B PERT IGG+IGM PNL SER: ABNORMAL
BILIRUB SERPL-MCNC: 6.1 MG/DL — HIGH (ref 0.2–1.2)
BUN SERPL-MCNC: 9 MG/DL — SIGNIFICANT CHANGE UP (ref 7–23)
CALCIUM SERPL-MCNC: 8.2 MG/DL — LOW (ref 8.4–10.5)
CERULOPLASMIN SERPL-MCNC: 24 MG/DL — SIGNIFICANT CHANGE UP (ref 15–30)
CHLORIDE SERPL-SCNC: 99 MMOL/L — SIGNIFICANT CHANGE UP (ref 98–107)
CO2 SERPL-SCNC: 26 MMOL/L — SIGNIFICANT CHANGE UP (ref 22–31)
COLOR FLD: YELLOW
CREAT SERPL-MCNC: 0.7 MG/DL — SIGNIFICANT CHANGE UP (ref 0.5–1.3)
EGFR: 108 ML/MIN/1.73M2 — SIGNIFICANT CHANGE UP
EOSINOPHIL # FLD: 0 % — SIGNIFICANT CHANGE UP
FERRITIN SERPL-MCNC: 1062 NG/ML — HIGH (ref 30–400)
FLUID INTAKE SUBSTANCE CLASS: SIGNIFICANT CHANGE UP
FOLATE+VIT B12 SERBLD-IMP: 0 % — SIGNIFICANT CHANGE UP
GLUCOSE FLD-MCNC: 115 MG/DL — SIGNIFICANT CHANGE UP
GLUCOSE SERPL-MCNC: 103 MG/DL — HIGH (ref 70–99)
GRAM STN FLD: SIGNIFICANT CHANGE UP
HCT VFR BLD CALC: 32.8 % — LOW (ref 39–50)
HGB BLD-MCNC: 10.8 G/DL — LOW (ref 13–17)
INR BLD: 1.55 RATIO — HIGH (ref 0.88–1.16)
LACTATE SERPL-SCNC: 1.4 MMOL/L — SIGNIFICANT CHANGE UP (ref 0.5–2)
LDH SERPL L TO P-CCNC: 83 U/L — SIGNIFICANT CHANGE UP
LYMPHOCYTES # FLD: 18 % — SIGNIFICANT CHANGE UP
MAGNESIUM SERPL-MCNC: 1.5 MG/DL — LOW (ref 1.6–2.6)
MCHC RBC-ENTMCNC: 32.9 GM/DL — SIGNIFICANT CHANGE UP (ref 32–36)
MCHC RBC-ENTMCNC: 34.3 PG — HIGH (ref 27–34)
MCV RBC AUTO: 104.1 FL — HIGH (ref 80–100)
MESOTHL CELL # FLD: 9 % — SIGNIFICANT CHANGE UP
MONOS+MACROS # FLD: 73 % — SIGNIFICANT CHANGE UP
NEUTROPHILS-BODY FLUID: 0 % — SIGNIFICANT CHANGE UP
NRBC # BLD: 0 /100 WBCS — SIGNIFICANT CHANGE UP (ref 0–0)
OTHER CELLS FLD MANUAL: 0 % — SIGNIFICANT CHANGE UP
PHOSPHATE SERPL-MCNC: 2.4 MG/DL — LOW (ref 2.5–4.5)
PLATELET # BLD AUTO: 52 K/UL — LOW (ref 150–400)
POTASSIUM SERPL-MCNC: 3.7 MMOL/L — SIGNIFICANT CHANGE UP (ref 3.5–5.3)
POTASSIUM SERPL-SCNC: 3.7 MMOL/L — SIGNIFICANT CHANGE UP (ref 3.5–5.3)
PROT FLD-MCNC: 2.4 G/DL — SIGNIFICANT CHANGE UP
PROT SERPL-MCNC: 7.2 G/DL — SIGNIFICANT CHANGE UP (ref 6–8.3)
PROTHROM AB SERPL-ACNC: 18.1 SEC — HIGH (ref 10.5–13.4)
RBC # BLD: 3.15 M/UL — LOW (ref 4.2–5.8)
RBC # FLD: 15 % — HIGH (ref 10.3–14.5)
RCV VOL RI: <2000 CELLS/UL — HIGH (ref 0–5)
SODIUM SERPL-SCNC: 136 MMOL/L — SIGNIFICANT CHANGE UP (ref 135–145)
SODIUM UR-SCNC: 192 MMOL/L — SIGNIFICANT CHANGE UP
SPECIMEN SOURCE: SIGNIFICANT CHANGE UP
TOTAL CELLS COUNTED, BODY FLUID: 100 CELLS — SIGNIFICANT CHANGE UP
TOTAL NUCLEATED CELL COUNT, BODY FLUID: 157 CELLS/UL — HIGH (ref 0–5)
TUBE TYPE: SIGNIFICANT CHANGE UP
WBC # BLD: 7.05 K/UL — SIGNIFICANT CHANGE UP (ref 3.8–10.5)
WBC # FLD AUTO: 7.05 K/UL — SIGNIFICANT CHANGE UP (ref 3.8–10.5)

## 2023-07-20 PROCEDURE — 99233 SBSQ HOSP IP/OBS HIGH 50: CPT | Mod: GC,25

## 2023-07-20 PROCEDURE — 88305 TISSUE EXAM BY PATHOLOGIST: CPT | Mod: 26

## 2023-07-20 PROCEDURE — 93975 VASCULAR STUDY: CPT | Mod: 26

## 2023-07-20 PROCEDURE — 99232 SBSQ HOSP IP/OBS MODERATE 35: CPT

## 2023-07-20 PROCEDURE — 49083 ABD PARACENTESIS W/IMAGING: CPT | Mod: GC

## 2023-07-20 PROCEDURE — 88112 CYTOPATH CELL ENHANCE TECH: CPT | Mod: 26

## 2023-07-20 RX ORDER — ACETYLCYSTEINE 200 MG/ML
10 VIAL (ML) MISCELLANEOUS ONCE
Refills: 0 | Status: COMPLETED | OUTPATIENT
Start: 2023-07-20 | End: 2023-07-20

## 2023-07-20 RX ORDER — SODIUM,POTASSIUM PHOSPHATES 278-250MG
1 POWDER IN PACKET (EA) ORAL EVERY 6 HOURS
Refills: 0 | Status: COMPLETED | OUTPATIENT
Start: 2023-07-20 | End: 2023-07-20

## 2023-07-20 RX ORDER — VANCOMYCIN HCL 1 G
125 VIAL (EA) INTRAVENOUS EVERY 6 HOURS
Refills: 0 | Status: DISCONTINUED | OUTPATIENT
Start: 2023-07-20 | End: 2023-07-20

## 2023-07-20 RX ORDER — FUROSEMIDE 40 MG
40 TABLET ORAL DAILY
Refills: 0 | Status: DISCONTINUED | OUTPATIENT
Start: 2023-07-20 | End: 2023-07-25

## 2023-07-20 RX ORDER — ACETYLCYSTEINE 200 MG/ML
10 VIAL (ML) MISCELLANEOUS ONCE
Refills: 0 | Status: DISCONTINUED | OUTPATIENT
Start: 2023-07-20 | End: 2023-07-20

## 2023-07-20 RX ORDER — ACETYLCYSTEINE 200 MG/ML
15 VIAL (ML) MISCELLANEOUS ONCE
Refills: 0 | Status: COMPLETED | OUTPATIENT
Start: 2023-07-20 | End: 2023-07-20

## 2023-07-20 RX ORDER — SPIRONOLACTONE 25 MG/1
100 TABLET, FILM COATED ORAL DAILY
Refills: 0 | Status: DISCONTINUED | OUTPATIENT
Start: 2023-07-20 | End: 2023-07-25

## 2023-07-20 RX ORDER — MAGNESIUM SULFATE 500 MG/ML
1 VIAL (ML) INJECTION ONCE
Refills: 0 | Status: COMPLETED | OUTPATIENT
Start: 2023-07-20 | End: 2023-07-20

## 2023-07-20 RX ORDER — ALBUMIN HUMAN 25 %
100 VIAL (ML) INTRAVENOUS
Refills: 0 | Status: COMPLETED | OUTPATIENT
Start: 2023-07-20 | End: 2023-07-20

## 2023-07-20 RX ORDER — POLYETHYLENE GLYCOL 3350 17 G/17G
17 POWDER, FOR SOLUTION ORAL DAILY
Refills: 0 | Status: DISCONTINUED | OUTPATIENT
Start: 2023-07-20 | End: 2023-07-28

## 2023-07-20 RX ORDER — URSODIOL 250 MG/1
500 TABLET, FILM COATED ORAL EVERY 12 HOURS
Refills: 0 | Status: DISCONTINUED | OUTPATIENT
Start: 2023-07-20 | End: 2023-07-25

## 2023-07-20 RX ORDER — CALAMINE AND ZINC OXIDE AND PHENOL 160; 10 MG/ML; MG/ML
1 LOTION TOPICAL DAILY
Refills: 0 | Status: DISCONTINUED | OUTPATIENT
Start: 2023-07-20 | End: 2023-07-28

## 2023-07-20 RX ORDER — MAGNESIUM SULFATE 500 MG/ML
1 VIAL (ML) INJECTION EVERY 4 HOURS
Refills: 0 | Status: DISCONTINUED | OUTPATIENT
Start: 2023-07-20 | End: 2023-07-20

## 2023-07-20 RX ORDER — LIDOCAINE HCL 20 MG/ML
20 VIAL (ML) INJECTION ONCE
Refills: 0 | Status: COMPLETED | OUTPATIENT
Start: 2023-07-20 | End: 2023-07-20

## 2023-07-20 RX ORDER — ALBUMIN HUMAN 25 %
100 VIAL (ML) INTRAVENOUS ONCE
Refills: 0 | Status: COMPLETED | OUTPATIENT
Start: 2023-07-20 | End: 2023-07-20

## 2023-07-20 RX ADMIN — Medication 20 MILLILITER(S): at 10:38

## 2023-07-20 RX ADMIN — Medication 1 PACKET(S): at 13:20

## 2023-07-20 RX ADMIN — HEPARIN SODIUM 5000 UNIT(S): 5000 INJECTION INTRAVENOUS; SUBCUTANEOUS at 05:31

## 2023-07-20 RX ADMIN — CHOLESTYRAMINE 4 GRAM(S): 4 POWDER, FOR SUSPENSION ORAL at 10:00

## 2023-07-20 RX ADMIN — Medication 40 MILLIGRAM(S): at 13:20

## 2023-07-20 RX ADMIN — Medication 100 MILLIGRAM(S): at 13:19

## 2023-07-20 RX ADMIN — Medication 50 MILLILITER(S): at 14:49

## 2023-07-20 RX ADMIN — CALAMINE AND ZINC OXIDE AND PHENOL 1 APPLICATION(S): 160; 10 LOTION TOPICAL at 18:10

## 2023-07-20 RX ADMIN — Medication 1 PACKET(S): at 17:16

## 2023-07-20 RX ADMIN — Medication 125 MILLIGRAM(S): at 13:19

## 2023-07-20 RX ADMIN — Medication 1 MILLIGRAM(S): at 13:19

## 2023-07-20 RX ADMIN — Medication 50 MILLILITER(S): at 17:15

## 2023-07-20 RX ADMIN — Medication 50 MILLILITER(S): at 11:36

## 2023-07-20 RX ADMIN — Medication 100 GRAM(S): at 08:23

## 2023-07-20 RX ADMIN — POLYETHYLENE GLYCOL 3350 17 GRAM(S): 17 POWDER, FOR SOLUTION ORAL at 13:20

## 2023-07-20 RX ADMIN — Medication 225 GRAM(S): at 13:31

## 2023-07-20 RX ADMIN — Medication 65.63 GRAM(S): at 14:50

## 2023-07-20 RX ADMIN — URSODIOL 500 MILLIGRAM(S): 250 TABLET, FILM COATED ORAL at 17:16

## 2023-07-20 RX ADMIN — HEPARIN SODIUM 5000 UNIT(S): 5000 INJECTION INTRAVENOUS; SUBCUTANEOUS at 21:03

## 2023-07-20 RX ADMIN — Medication 1 TABLET(S): at 13:19

## 2023-07-20 RX ADMIN — Medication 50 MILLILITER(S): at 20:20

## 2023-07-20 NOTE — PROGRESS NOTE ADULT - ATTENDING COMMENTS
57-year-old male with a history of alcohol use disorder (AUD) and remote deep vein thrombosis (DVT), presenting with jaundice and ascites, suggestive of alcoholic hepatitis with possible cirrhosis.    Recommendations:    Infectious Workup:  -Obtain blood and urine cultures to evaluate for any underlying infections.  -Pending culture results, consider initiating ceftriaxone (CTX) 1g daily for 48 hours to cover potential infections. A urinalysis (UA) was unremarkable, and the positive C. diff PCR, despite being asymptomatic, may represent colonization.    Alcoholic Hepatitis Management:  -Commence N-acetylcysteine (NAC) therapy as noted in Fellow's note  -Start ursodiol 500mg BID to further aid liver function.  -Monitor trends in complete blood count (CBC), comprehensive metabolic panel (CMP), and international normalized ratio (INR) for liver function and coagulation status.    Alcohol Cessation and Withdrawal Management:  -Encourage the patient to quit alcohol and offer support for alcohol cessation.  -Implement the Clinical [place] Withdrawal Assessment for Alcohol (CIWA) scoring for symptom-triggered management of alcohol withdrawal.    Ascitic Fluid Studies:  -Schedule a follow-up tomorrow for ascitic fluid studies to assess its characteristics and help determine the cause of ascites.  Imaging:  -Arrange a CT abdomen/pelvis with intravenous contrast to evaluate for possible cirrhosis and other abdominal pathology.  -Recommend a 2D ECHO to rule out CHF    Diuresis:  -Initiate furosemide 40mg and spironolactone 100mg for diuresis to manage fluid overload.    Screening Esophagogastroduodenoscopy (EGD):  -Schedule a screening EGD to assess for esophageal varices. This procedure can be done as an outpatient.    Transplant candidacy:  Patient will benefit with transplant evaluation. We will plan to open a formal evaluation as an outpatient.     Ensure close monitoring of the patient's response to treatment and adjust the plan based on further investigations and clinical progress

## 2023-07-20 NOTE — DISCHARGE NOTE PROVIDER - NSDCFUADDAPPT_GEN_ALL_CORE_FT
Please call gastroenterology to schedule an appointment. You will need to discuss scheduling an endoscopy. Please call gastroenterology to schedule an appointment. You will need to discuss scheduling an endoscopy.    Please also follow up with Interventional radiology

## 2023-07-20 NOTE — PROGRESS NOTE ADULT - ATTENDING COMMENTS
57M with PMH of AUD, ?prior DVT no longer on AC who is presenting with increasing abdominal distention and b/l LE edema secondary to acute decompensated alcoholic cirrhosis.     # AUD  # Alcoholic Hepatitis   # Acute decompensated alcoholic cirrhosis   - no h/o withdrawals, CIWAs have been negative, SBIRT completed,   - s/p paracentesis today with 16L removed; fluid analysis not consistent with SBP. f/u culture   - Hepatology following - c/w NAC, ursodiol; no plans for steroids at this time     # + C diff - unclear if this is colonization vs actual infection given that he is now having formed stool. Will start on PO vanco for now.   # Anemia, Thrombocytopenia, Coagulopathy  - in setting of cirrhosis, continue to monitor

## 2023-07-20 NOTE — PROGRESS NOTE ADULT - PROBLEM SELECTOR PLAN 5
Pt w/ recent diarrhea, no N/V, likely in setting alcohol use and liver dysfunction. no recent abx use. Low suspicion for infection at this time, will r/o.     -C diff PCR, GI stool PCR, and stool cxs ordered  -f/u CTAP with IV contrast  -monitor stool count  -monitor electrolytes and replete PRN  -isolation precautions pending C diff. Pt w/ recent diarrhea, no N/V, likely in setting alcohol use and liver dysfunction. no recent abx use. Patient not having diarrhea on admission. C. diff positive however low suspicion for active infection at this time. GI pcr negative    -f/u stool culture NGTD  -f/u CT angio abd w/ oral and IV contrast  -monitor stool count  -on isolation precautions, can d/c 7/21 if no diarrhea

## 2023-07-20 NOTE — DISCHARGE NOTE PROVIDER - NSDCCPCAREPLAN_GEN_ALL_CORE_FT
PRINCIPAL DISCHARGE DIAGNOSIS  Diagnosis: Alcoholic cirrhosis  Assessment and Plan of Treatment: You came to the hospital with a distended stomach, which we found is because your liver is significantly damaged from years of alcohol use, and so has caused a pressure buildup in your abdomen, causing fluid to accumulate. You had a "paracentesis," where 16 liters of fluid was removed from your abdomen. You were evaluated by our liver specialists (hepatologists) who recommended you start taking medication that makes you urinate out excess fluid and stool softeners that help you excrete the toxins your liver is no longer able to filter out. You should alert a medical professional if you notice decreased urine output, start having less than 2-3 bowel movements a day, become confused/tired, or start to feel very sick.   Drinking more alcohol will lead to worsening of your condition, which will lead to more hospitalizations and death. Stopping drinking is hard, but you can receive help from your PCP, AA or other support groups. Please follow up with your PCP and hepatology for further care.     PRINCIPAL DISCHARGE DIAGNOSIS  Diagnosis: Alcoholic cirrhosis  Assessment and Plan of Treatment: You came to the hospital with a distended stomach, which we found is because your liver is significantly damaged from years of alcohol use, and so has caused a pressure buildup in your abdomen, causing fluid to accumulate. You had a "paracentesis," where 16 liters of fluid was removed from your abdomen. You were evaluated by our liver specialists (hepatologists) who recommended you start taking medication that makes you urinate out excess fluid and stool softeners that help you excrete the toxins your liver is no longer able to filter out. You should alert a medical professional if you notice decreased urine output, start having less than 2-3 bowel movements a day, become confused/tired, or start to feel very sick.   Drinking more alcohol will lead to worsening of your condition, which will lead to more hospitalizations and death. Stopping drinking is hard, but you can receive help from your PCP, AA or other support groups. Please follow up with your PCP and hepatology for further care.      SECONDARY DISCHARGE DIAGNOSES  Diagnosis: Macrocytic anemia  Assessment and Plan of Treatment: When you came to the hospital, your blood work showed anemia, a condition in which you don't have enough red blood cells. After additional testing, your anemia was determined to most likely be caused by your alcohol use. Drinking more alcohol will lead to worsening of your condition. See above for advice on stopping drinking.    Diagnosis: Electrolyte abnormality  Assessment and Plan of Treatment: When you came to the hospital, your blood work showed abnormal electrolyte levels, including low magnesium, phosphorus, and potassium. You were given supplements to improve your electrolyte levels, which have since returned to normal. Your abnormal electrolytes are most likely due to your alcohol use and past episodes of diarrhea. See above for advice on stopping drinking.     PRINCIPAL DISCHARGE DIAGNOSIS  Diagnosis: Alcoholic cirrhosis  Assessment and Plan of Treatment: You came to the hospital with a distended stomach, which we found is because your liver is significantly damaged from years of alcohol use, and so has caused a pressure buildup in your abdomen, causing fluid to accumulate. You had a "paracentesis," where 16 liters of fluid was removed from your abdomen. You were evaluated by our liver specialists (hepatologists) who recommended you start taking medication that makes you urinate out excess fluid and stool softeners that help you excrete the toxins your liver is no longer able to filter out. You should alert a medical professional if you notice decreased urine output, start having less than 2-3 bowel movements a day, become confused/tired, or start to feel very sick. You are scheduled for an appointment with Interventional Radiology on 8/10/23 at 11a, 2nd floor of [facility] for repeat paracentesis.  Drinking more alcohol will lead to worsening of your condition, which will lead to more hospitalizations and death. Stopping drinking is hard, but you can receive help from your PCP, AA or other support groups. Please follow up with your PCP and hepatology for further care.      SECONDARY DISCHARGE DIAGNOSES  Diagnosis: Macrocytic anemia  Assessment and Plan of Treatment: When you came to the hospital, your blood work showed anemia, a condition in which you don't have enough red blood cells. After additional testing, your anemia was determined to most likely be caused by your alcohol use. Drinking more alcohol will lead to worsening of your condition. See above for advice on stopping drinking.    Diagnosis: Electrolyte abnormality  Assessment and Plan of Treatment: When you came to the hospital, your blood work showed abnormal electrolyte levels, including low magnesium, phosphorus, and potassium. You were given supplements to improve your electrolyte levels, which have since returned to normal. Your abnormal electrolytes are most likely due to your alcohol use and past episodes of diarrhea. See above for advice on stopping drinking.

## 2023-07-20 NOTE — DISCHARGE NOTE PROVIDER - PROVIDER TOKENS
FREE:[LAST:[internal],FIRST:[medicine],PHONE:[(   )    -],FAX:[(   )    -]] PROVIDER:[TOKEN:[[id number]],FOLLOWUP:[1 week]]

## 2023-07-20 NOTE — PROGRESS NOTE ADULT - ASSESSMENT
56 yo M with PMHx of EtOH abuse (3-5 double nayana whiskey 5 days a week x25 years) and DVT presents for swelling of abdomen and legs for past 10 days, likely ascites 2/2 alcoholic cirrhosis.

## 2023-07-20 NOTE — DISCHARGE NOTE PROVIDER - NSDCCPTREATMENT_GEN_ALL_CORE_FT
PRINCIPAL PROCEDURE  Procedure: Paracentesis at bedside  Findings and Treatment: Invasive procedure team was consulted for diagnostic/therapeutic paracentesis due to abdominal discomfort. Explained risks (including bleeding, infection, and bowel perforation) and benefits to patient and patient expressed understanding and consented. Ultrasound was utilized and visualized 10cm Ascitic fluid pocket identified w/ no epigastric vessels visualized. No rash or vessels overlying skin site. Pts plts 52, INR 1.55, DVT PPx held over night, not on NOACs or coumadin. Sterile technique was used and 5mL of 1% lidocaine was used for local anesthesia. Small incision with scalpel done and 18 Fr Arrow Paracentesis catheter used. 16.3L of clear yellow ascitic fluid drained. Catheter removed and dressed. Pt tolerated procedure well and no complications.     PRINCIPAL PROCEDURE  Procedure: Paracentesis at bedside  Findings and Treatment: Invasive procedure team was consulted for diagnostic/therapeutic paracentesis due to abdominal discomfort. Explained risks (including bleeding, infection, and bowel perforation) and benefits to patient and patient expressed understanding and consented. Ultrasound was utilized and visualized 10cm Ascitic fluid pocket identified w/ no epigastric vessels visualized. No rash or vessels overlying skin site. Pts plts 52, INR 1.55, DVT PPx held over night, not on NOACs or coumadin. Sterile technique was used and 5mL of 1% lidocaine was used for local anesthesia. Small incision with scalpel done and 18 Fr Arrow Paracentesis catheter used. 16.3L of clear yellow ascitic fluid drained. Catheter removed and dressed. Pt tolerated procedure well and no complications.      SECONDARY PROCEDURE  Procedure: US Doppler vessels of abdomen  Findings and Treatment: FINDINGS:  Limited evaluation of the vasculature due to body habitus.  Liver: Shrunken, nodular surface contour and heterogeneously increased   echogenicity suggestive of cirrhosis. Main portal vein is patent. Left   portal vein is patent with normal direction of flow. Right portal vein,   hepatic veins, and hepatic artery are not visualized.  Bile ducts: Normal caliber. Common bile duct measures 4 mm.  Gallbladder: Sludge. No focal tenderness. Diffuse gallbladder wall   thickening measuring up to 1 cm likely due to ascites and adjacent   hepatocellular disease.  Pancreas: Poorly visualized.  Right kidney: 10.7 cm.No hydronephrosis.  Ascites: Large ascites.  Aorta and IVC: Visualized portions are within normal limits.  IMPRESSION:  Limited evaluation of the hepatic vasculature due to large volume   ascites. Main and left portal veins are patent. Right portalvein,   hepatic veins, and right hepatic artery not visualized.  Cirrhosis.      Procedure: US abdomen limited  Findings and Treatment: IMPRESSION:  Large volume ascites in the bilateral lower and right upper quadrant,   largest pocket in the right lower quadrant.

## 2023-07-20 NOTE — PROGRESS NOTE ADULT - PROBLEM SELECTOR PLAN 2
Pt with elevated t bili to 7.3, alk phos 388, AST 91, and ALT 18. Elevated GGT suggestive of cholestatic disease. Concerning for cholestasis/biliary obstruction with component of hepatocellular injury/hepatitis 2/2 alcohol use    -appreciate GI/Hepatology recs  -trend LFTs  -f/u CT A/P  -MRCP for elevated TBili  -f/u dbili Pt with elevated t bili to 9.0, d bili 4.5, alk phos 388, AST 91, and ALT 18. Elevated GGT suggestive of cholestatic disease. Concerning for cholestasis/biliary obstruction with component of hepatocellular injury/hepatitis 2/2 alcohol use    -appreciate GI/Hepatology recs  -trend LFTs  -f/u CT A/P  -MRCP for elevated TBili Pt with elevated t bili to 9.0, d bili 4.5, alk phos 388, AST 91, and ALT 18. Elevated GGT suggestive of cholestatic disease. Concerning for cholestasis/biliary obstruction with component of hepatocellular injury/hepatitis 2/2 alcohol use  -appreciate GI/Hepatology recs  -trend LFTs  -f/u CT angio abd w/ oral and IV contrast  -f/u MRCP for elevated TBili Pt with elevated t bili to 9.0, d bili 4.5, alk phos 388, AST 91, and ALT 18. Elevated GGT suggestive of cholestatic disease. Concerning for cholestasis/biliary obstruction with component of hepatocellular injury/hepatitis 2/2 alcohol use.  -appreciate GI/Hepatology recs  -trend LFTs  -s/p loading dose NAC 150mg/kg/day, now on maintenance dose 100mg/kg/day   -c/w ursodiol 500mg BID  -f/u bcx, HIV, Hep A, Joseph  -f/u CT angio abd w/ oral and IV contrast  -f/u MRCP for elevated TBili

## 2023-07-20 NOTE — PROGRESS NOTE ADULT - PROBLEM SELECTOR PLAN 11
potassium 3.6, mag 1.1>1.5, and phos 2.6 on admission. Likely in setting of chronic alcohol use.   -Goal Mag 2.0, Phos 3.0, Potassium 4.0  -will replete as needed as of 7/20, mag 1.5, phos 2.4, potassium 3.7. Likely in setting of chronic alcohol use.   -Goal Mag 2.0, Phos 3.0, Potassium 4.0  -will replete as needed

## 2023-07-20 NOTE — PROGRESS NOTE ADULT - PROBLEM SELECTOR PLAN 6
Likely from abdominal distention from ascites. No respiratory sx as of 7/19. SpO2 consistently % on RA. also reports worsening leg swelling over the past 2 weeks. reports hx of DVT approximately 30 years ago. On exam has 2+ pitting edema to below the knee. Calves nontender however left calf is mildly firm, possibly due to skin changes. US duplex BLE showed L beltran's cyst but no DVT.  -vital signs q4  -pulse ox  -f/u TTE to r/o structural heart disease   -monitor following tap Likely from abdominal distention from ascites. No respiratory sx as of 7/19. SpO2 consistently % on RA. also reports worsening leg swelling over the past 2 weeks. reports hx of DVT approximately 30 years ago. On exam has 2+ pitting edema to below the knee. Calves nontender however left calf is mildly firm, possibly due to skin changes. US duplex BLE showed L beltran's cyst but no DVT. TTE with mild diastolic dysfunction, EF 61%.   -vital signs q4  -pulse ox  -monitor following tap

## 2023-07-20 NOTE — PROGRESS NOTE ADULT - PROBLEM SELECTOR PLAN 8
on exam has multiple areas of excoriation and scabbing to all extremities. Pt reports he has intermittent diffuse skin pruritis, not experiencing at this time. Has tried multiple OTC creams to no effect. Pruritis likely 2/2 elevated bilirubin and liver dysfunction.   -start cholestyramine 4g daily  -monitor on exam has multiple areas of excoriation and scabbing to all extremities. Pt reports he has intermittent diffuse skin pruritis, not experiencing at this time. Has tried multiple OTC creams to no effect. Pruritis likely 2/2 elevated bilirubin and liver dysfunction.   -c/w cholestyramine 4g daily  -start calamine lotion  -monitor

## 2023-07-20 NOTE — PROGRESS NOTE ADULT - PROBLEM SELECTOR PLAN 3
Hgb stable at 12.1>12.0 with .5, consistent with macrocytic anemia, likely 2/2 alcohol use with b12/folate deficiencies. Hemolytic anemia considered () however less likely given Retic 3.0%, haptoglobin 105.  -f/u B12, folate, ferritin  -c/w folic acid supplement daily Hgb stable at 12.1>12.0 with .5, consistent with macrocytic anemia, likely 2/2 alcohol use with b12/folate deficiencies. Hemolytic anemia considered () however less likely given Retic 3.0%, haptoglobin 105.  -f/u B12, folate  -c/w folic acid supplement daily

## 2023-07-20 NOTE — PROGRESS NOTE ADULT - PROBLEM SELECTOR PLAN 7
Plt count 58>44. Also with elevated PT/PTT, elevated D-dimer, decreased fibrinogen. Suspect coagulopathies from bone marrow suppression in setting of chronic alcohol use and possibly from liver failure. Low suspicion for TTP given lack of fevers, renal failure, and AMS. DIC also considered but unlikely given normal haptoglobin    -monitor plt  -check coag, D-dimer, and fibrinogen to r/o DIC.  -transfuse <10, <20k and febrile, and <50k w/ active bleeding Plt count 58>44>52. Also with elevated PT/PTT, elevated D-dimer, decreased fibrinogen. Suspect coagulopathies from bone marrow suppression in setting of chronic alcohol use and possibly from liver failure. Low suspicion for TTP given lack of fevers, renal failure, and AMS. DIC also considered but unlikely given normal haptoglobin    -monitor plt  -check coag, D-dimer, and fibrinogen to r/o DIC.  -transfuse <10, <20k and febrile, and <50k w/ active bleeding Plt count 58>44>52. Also with elevated PT/PTT, elevated D-dimer, decreased fibrinogen. Suspect coagulopathies from bone marrow suppression in setting of chronic alcohol use and possibly from liver failure. Low suspicion for TTP given lack of fevers, renal failure, and AMS. DIC also considered but unlikely given normal haptoglobin  -monitor plt  -transfuse <10, <20k and febrile, and <50k w/ active bleeding

## 2023-07-20 NOTE — PROGRESS NOTE ADULT - PROBLEM SELECTOR PLAN 4
Pt drinks 3-5 "double glass" of [place] whiskey after work for the past 25 years. Denies hx of withdrawal syndrome. Last drink a week ago, but pt with elevated blood alcohol level. On exam, no tongue fasciculations or tremors.    -s/p librium x1  -start symptom-triggered CIWA with Ativan for now  -thiamine 100 mg 7/18-7/22  -folic acid 1 mcg daily  -aspiration precautions  -seizure precautions  -fall risk protocol  -SW consult. Pt drinks 3-5 "double glass" of [place] whiskey after work for the past 25 years. Denies hx of withdrawal syndrome. Last drink a week ago, but pt with elevated blood alcohol level. On exam, no tongue fasciculations or tremors.  -s/p librium x1  -start symptom-triggered CIWA with Ativan for now  -thiamine 100 mg 7/18-7/22  -folic acid 1 mcg daily  -aspiration and seizure precautions  -fall risk protocol  -SW consult.

## 2023-07-20 NOTE — PROGRESS NOTE ADULT - SUBJECTIVE AND OBJECTIVE BOX
Interval Events:   -Feeling well t his morning. Going down for tap  - Maddrey 27.7 today  -C. diff pos though pt reporting no diarrhea and only 1 large BM this morning, non bloody. Slept through night.      Hospital Medications:  acetaminophen     Tablet .. 650 milliGRAM(s) Oral every 6 hours PRN  acetylcysteine IVPB 15 Gram(s) IV Intermittent once  cholestyramine Powder (Sugar-Free) 4 Gram(s) Oral daily  folic acid 1 milliGRAM(s) Oral daily  furosemide    Tablet 40 milliGRAM(s) Oral daily  heparin   Injectable 5000 Unit(s) SubCutaneous every 8 hours  lidocaine 1% Injectable 20 milliLiter(s) Local Injection once  LORazepam     Tablet 2 milliGRAM(s) Oral every 2 hours PRN  LORazepam   Injectable 2 milliGRAM(s) IV Push every 1 hour PRN  multivitamin 1 Tablet(s) Oral daily  potassium phosphate / sodium phosphate Powder (PHOS-NaK) 1 Packet(s) Oral every 6 hours  spironolactone 100 milliGRAM(s) Oral daily  thiamine 100 milliGRAM(s) Oral daily  ursodiol Tablet 500 milliGRAM(s) Oral every 12 hours      ROS: All system reviewed and negative except as mentioned above.    PHYSICAL EXAM:   Vital Signs:  Vital Signs Last 24 Hrs  T(C): 36.8 (20 Jul 2023 06:30), Max: 37.1 (20 Jul 2023 02:30)  T(F): 98.3 (20 Jul 2023 06:30), Max: 98.8 (20 Jul 2023 02:30)  HR: 91 (20 Jul 2023 06:30) (91 - 98)  BP: 139/83 (20 Jul 2023 06:30) (130/85 - 155/87)  BP(mean): --  RR: 18 (20 Jul 2023 06:30) (17 - 18)  SpO2: 97% (20 Jul 2023 06:30) (97% - 100%)    Parameters below as of 20 Jul 2023 06:30  Patient On (Oxygen Delivery Method): room air      Daily     Daily     GENERAL:  NAD, Appears stated age, diffusely jaundiced  HEENT:  NC/AT,  conjunctivae clear and pink, sclera -anicteric  CHEST:  Normal Effort, Breath sounds clear  HEART:  RRR, S1 + S2, no murmurs  ABDOMEN:  Tense, large ascites w/ reducible hernia  EXTREMITIES:  2+ pitting edema, chronic scabs/scarring from pt picking per reports  SKIN:  Warm & Dry. No rash or erythema  NEURO:  Alert, oriented, no focal deficit. Very minimal asterixis    LABS:                        10.8   7.05  )-----------( 52       ( 20 Jul 2023 04:25 )             32.8     Mean Cell Volume: 104.1 fL (07-20-23 @ 04:25)    07-20    136  |  99  |  9   ----------------------------<  103<H>  3.7   |  26  |  0.70    Ca    8.2<L>      20 Jul 2023 04:25  Phos  2.4     07-20  Mg     1.50     07-20    TPro  7.2  /  Alb  2.9<L>  /  TBili  6.1<H>  /  DBili  x   /  AST  80<H>  /  ALT  17  /  AlkPhos  327<H>  07-20    LIVER FUNCTIONS - ( 20 Jul 2023 04:25 )  Alb: 2.9 g/dL / Pro: 7.2 g/dL / ALK PHOS: 327 U/L / ALT: 17 U/L / AST: 80 U/L / GGT: x           PT/INR - ( 20 Jul 2023 04:25 )   PT: 18.1 sec;   INR: 1.55 ratio         PTT - ( 20 Jul 2023 04:25 )  PTT:38.5 sec  Urinalysis Basic - ( 20 Jul 2023 04:25 )    Color: x / Appearance: x / SG: x / pH: x  Gluc: 103 mg/dL / Ketone: x  / Bili: x / Urobili: x   Blood: x / Protein: x / Nitrite: x   Leuk Esterase: x / RBC: x / WBC x   Sq Epi: x / Non Sq Epi: x / Bacteria: x                              10.8   7.05  )-----------( 52       ( 20 Jul 2023 04:25 )             32.8                         12.0   7.48  )-----------( 44       ( 19 Jul 2023 07:44 )             35.9                         12.1   8.86  )-----------( 58       ( 18 Jul 2023 14:15 )             36.3       Imaging: Images reviewed.

## 2023-07-20 NOTE — PROGRESS NOTE ADULT - PROBLEM SELECTOR PLAN 10
QTc on admission  ms. Likely in setting of hypomagnesemia with serum Mg 1.1 on admission.   -avoid QT prolonging agents  -replete serum Mg with IV MgSO4 and recheck QTc with repeat EKG.

## 2023-07-20 NOTE — DISCHARGE NOTE PROVIDER - NSDCMRMEDTOKEN_GEN_ALL_CORE_FT
cinnamon:   garlic:   Multiple Vitamins oral tablet: 1 tab(s) orally once a day   cinnamon:   garlic:   Multiple Vitamins oral tablet: 1 tab(s) orally once a day  prednisoLONE (as sodium phosphate) 20 mg/5 mL oral liquid: 10 milliliter(s) orally once a day   cinnamon:   garlic:   Multiple Vitamins oral tablet: 1 tab(s) orally once a day  prednisoLONE (as sodium phosphate) 20 mg/5 mL oral liquid: 10 milliliter(s) orally once a day  predniSONE 20 mg oral tablet: 2 tab(s) orally once a day   cinnamon:   furosemide 40 mg oral tablet: 1 tab(s) orally every 12 hours  garlic:   Multiple Vitamins oral tablet: 1 tab(s) orally once a day  predniSONE 20 mg oral tablet: 2 tab(s) orally once a day  spironolactone 25 mg oral tablet: 4 tab(s) orally 2 times a day

## 2023-07-20 NOTE — PROGRESS NOTE ADULT - PROBLEM SELECTOR PLAN 1
Pt with 1.5 weeks of increasing abdominal distention and b/l LE edema. On exam. tense ascites appreciated. Concerning for ascites in setting of possible liver failure from alcohol use vs. Budd Chiari syndrome, less likely malignant ascites or from acute decompensated heart failure. Ascites confirmed on ultrasound. Low suspicion for SBP as pt w/o fevers, leukocytosis, or abdominal pain, will monitor off abx.    -GI/hepatology consulted appreciated recs  -placed procedure team order, paracentesis scheduled for 7/20  -repeat CBC/PTT/PT/INR/CMP in AM before tap  -f/u therapeutic tap w/ studies (saag, cell count diff, cx, total protein)  -f/u CT abdomen/pelvis  -serial abdominal exams Pt with 1.5 weeks of increasing abdominal distention and b/l LE edema. On exam tense ascites appreciated. Concerning for ascites in setting of possible liver failure from alcohol use vs. Budd Chiari syndrome, less likely malignant ascites or from acute decompensated heart failure. Ascites confirmed on ultrasound. Abdominal US demonstrating cirrhosis, likely alcoholic. s/p paracentesis with 16L removed. Patient feeling much better after having fluid removed. Peritoneal fluid showing 157 nucleated cell count r/o SBP.     -GI/hepatology consulted appreciated recs  -c/w   -f/u therapeutic tap w/ studies (saag, cell count diff, cx, total protein)  -f/u CT abdomen/pelvis  -serial abdominal exams Pt with 1.5 weeks of increasing abdominal distention and b/l LE edema. On exam tense ascites appreciated. Concerning for ascites in setting of possible liver failure from alcohol use vs. Budd Chiari syndrome, less likely malignant ascites or from acute decompensated heart failure. Ascites confirmed on ultrasound. Abdominal US demonstrating cirrhosis, likely alcoholic. s/p paracentesis with 16L removed. Patient feeling much better after having fluid removed. Peritoneal fluid showing 157 nucleated cell count r/o SBP.   -GI/hepatology consulted appreciated recs  -s/p loading dose NAC 150mg/kg/day, now on maintenance dose 100mg/kg/day   -c/w ursodiol 500mg BID  -f/u therapeutic tap w/ studies (saag, cell count diff, cx, total protein)  -f/u CT angio abd w/ oral and IV contrast  -f/u bcx, HIV  -serial abdominal exams Pt with 1.5 weeks of increasing abdominal distention and b/l LE edema. On exam tense ascites appreciated. Concerning for ascites in setting of possible liver failure from alcohol use vs. Budd Chiari syndrome, less likely malignant ascites or from acute decompensated heart failure. Ascites confirmed on ultrasound. Abdominal US demonstrating cirrhosis, likely alcoholic. s/p paracentesis with 16L removed. Patient feeling much better after having fluid removed. Peritoneal fluid showing 157 nucleated cell count r/o SBP.   -GI/hepatology consulted appreciated recs  -f/u therapeutic tap w/ studies (saag, cell count diff, cx, total protein)  -f/u CT angio abd w/ oral and IV contrast  -serial abdominal exams  -c/w Lasix 40mg PO, spironolactone 100mg PO  -c/w Miralax daily Pt with 1.5 weeks of increasing abdominal distention and b/l LE edema. On exam tense ascites appreciated. Concerning for ascites in setting of possible liver failure from alcohol use vs. Budd Chiari syndrome, less likely malignant ascites or from acute decompensated heart failure. Ascites confirmed on ultrasound. Abdominal US demonstrating cirrhosis, likely alcoholic. s/p paracentesis with 16L removed. Patient feeling much better after having fluid removed. Peritoneal fluid showing 157 nucleated cell count r/o SBP.   -GI/hepatology consulted appreciated recs  -c/w IV albumin 25%   -f/u therapeutic tap w/ studies (saag, cell count diff, cx, total protein)  -f/u CT angio abd w/ oral and IV contrast  -serial abdominal exams  -c/w Lasix 40mg PO, spironolactone 100mg PO  -c/w Miralax daily

## 2023-07-20 NOTE — PROGRESS NOTE ADULT - SUBJECTIVE AND OBJECTIVE BOX
Emerson Ocasio MD  PGY 1 Department of Internal Medicine        Patient is a 57y old  Male who presents with a chief complaint of Abdominal distention (19 Jul 2023 16:34)      SUBJECTIVE / OVERNIGHT EVENTS: Pt seen and examined. No acute overnight events. Denies fevers, chills, CP, SOB, Abdominal pain, N/V, Constipation, Diarrhea        MEDICATIONS  (STANDING):  acetylcysteine IVPB 15 Gram(s) IV Intermittent once  cholestyramine Powder (Sugar-Free) 4 Gram(s) Oral daily  folic acid 1 milliGRAM(s) Oral daily  furosemide    Tablet 40 milliGRAM(s) Oral daily  heparin   Injectable 5000 Unit(s) SubCutaneous every 8 hours  lidocaine 1% Injectable 20 milliLiter(s) Local Injection once  multivitamin 1 Tablet(s) Oral daily  potassium phosphate / sodium phosphate Powder (PHOS-NaK) 1 Packet(s) Oral every 6 hours  spironolactone 100 milliGRAM(s) Oral daily  thiamine 100 milliGRAM(s) Oral daily  ursodiol Tablet 500 milliGRAM(s) Oral every 12 hours    MEDICATIONS  (PRN):  acetaminophen     Tablet .. 650 milliGRAM(s) Oral every 6 hours PRN Temp greater or equal to 38C (100.4F), Mild Pain (1 - 3)  LORazepam     Tablet 2 milliGRAM(s) Oral every 2 hours PRN CIWA-Ar score increase by 2 points and a total score of 7 or less  LORazepam   Injectable 2 milliGRAM(s) IV Push every 1 hour PRN CIWA-Ar score 8 or greater      I&O's Summary      Vital Signs Last 24 Hrs  T(C): 36.8 (20 Jul 2023 06:30), Max: 37.1 (20 Jul 2023 02:30)  T(F): 98.3 (20 Jul 2023 06:30), Max: 98.8 (20 Jul 2023 02:30)  HR: 91 (20 Jul 2023 06:30) (91 - 98)  BP: 139/83 (20 Jul 2023 06:30) (130/85 - 155/87)  BP(mean): --  RR: 18 (20 Jul 2023 06:30) (17 - 18)  SpO2: 97% (20 Jul 2023 06:30) (97% - 100%)    Parameters below as of 20 Jul 2023 06:30  Patient On (Oxygen Delivery Method): room air        CAPILLARY BLOOD GLUCOSE          PHYSICAL EXAM:  GENERAL: NAD,   HEAD:  Atraumatic, Normocephalic  EYES: EOMI, PERRL, conjunctiva and sclera clear  NECK: No JVD  CHEST/LUNG: Clear to auscultation bilaterally; No wheeze  HEART: Regular rate and rhythm; No murmurs, rubs, or gallops  ABDOMEN: Soft, Nontender, Nondistended; Bowel sounds present  EXTREMITIES:  2+ Peripheral Pulses, No clubbing, cyanosis, or edema  PSYCH: AAOx3  NEUROLOGY: non-focal  SKIN: No rashes or lesions       LABS:                        10.8   7.05  )-----------( 52       ( 20 Jul 2023 04:25 )             32.8     Auto Eosinophil # x     / Auto Eosinophil % x     / Auto Neutrophil # x     / Auto Neutrophil % x     / BANDS % x                            12.0   7.48  )-----------( 44       ( 19 Jul 2023 07:44 )             35.9     Auto Eosinophil # 0.08  / Auto Eosinophil % 1.1   / Auto Neutrophil # 5.98  / Auto Neutrophil % 79.8  / BANDS % x                            12.1   8.86  )-----------( 58       ( 18 Jul 2023 14:15 )             36.3     Auto Eosinophil # 0.05  / Auto Eosinophil % 0.6   / Auto Neutrophil # 7.45  / Auto Neutrophil % 84.0  / BANDS % x        07-20    136  |  99  |  9   ----------------------------<  103<H>  3.7   |  26  |  0.70  07-19    135  |  98  |  8   ----------------------------<  91  3.6   |  22  |  0.59  07-18    136  |  97<L>  |  5<L>  ----------------------------<  87  3.6   |  23  |  0.61    Ca    8.2<L>      20 Jul 2023 04:25  Mg     1.50     07-20  Phos  2.4     07-20  TPro  7.2  /  Alb  2.9<L>  /  TBili  6.1<H>  /  DBili  x   /  AST  80<H>  /  ALT  17  /  AlkPhos  327<H>  07-20  TPro  7.6  /  Alb  3.0<L>  /  TBili  9.0<H>  /  DBili  4.5<H>  /  AST  83<H>  /  ALT  16  /  AlkPhos  342<H>  07-19  TPro  7.9  /  Alb  3.4  /  TBili  7.3<H>  /  DBili  x   /  AST  91<H>  /  ALT  18  /  AlkPhos  388<H>  07-18    PT/INR - ( 20 Jul 2023 04:25 )   PT: 18.1 sec;   INR: 1.55 ratio         PTT - ( 20 Jul 2023 04:25 )  PTT:38.5 sec      Urinalysis Basic - ( 20 Jul 2023 04:25 )    Color: x / Appearance: x / SG: x / pH: x  Gluc: 103 mg/dL / Ketone: x  / Bili: x / Urobili: x   Blood: x / Protein: x / Nitrite: x   Leuk Esterase: x / RBC: x / WBC x   Sq Epi: x / Non Sq Epi: x / Bacteria: x      Lactate, Blood: 1.4 mmol/L (07-20 @ 04:25)        RADIOLOGY & ADDITIONAL TESTS:    Imaging Personally Reviewed:    Consultant(s) Notes Reviewed:      Care Discussed with Consultants/Other Providers:   Emerson Ocasio MD  PGY 1 Department of Internal Medicine        Patient is a 57y old  Male who presents with a chief complaint of Abdominal distention (19 Jul 2023 16:34)      SUBJECTIVE / OVERNIGHT EVENTS: Pt seen and examined. No acute overnight events. Denies fevers, chills, CP, SOB, Abdominal pain, N/V, Constipation, Diarrhea        MEDICATIONS  (STANDING):  acetylcysteine IVPB 15 Gram(s) IV Intermittent once  cholestyramine Powder (Sugar-Free) 4 Gram(s) Oral daily  folic acid 1 milliGRAM(s) Oral daily  furosemide    Tablet 40 milliGRAM(s) Oral daily  heparin   Injectable 5000 Unit(s) SubCutaneous every 8 hours  lidocaine 1% Injectable 20 milliLiter(s) Local Injection once  multivitamin 1 Tablet(s) Oral daily  potassium phosphate / sodium phosphate Powder (PHOS-NaK) 1 Packet(s) Oral every 6 hours  spironolactone 100 milliGRAM(s) Oral daily  thiamine 100 milliGRAM(s) Oral daily  ursodiol Tablet 500 milliGRAM(s) Oral every 12 hours    MEDICATIONS  (PRN):  acetaminophen     Tablet .. 650 milliGRAM(s) Oral every 6 hours PRN Temp greater or equal to 38C (100.4F), Mild Pain (1 - 3)  LORazepam     Tablet 2 milliGRAM(s) Oral every 2 hours PRN CIWA-Ar score increase by 2 points and a total score of 7 or less  LORazepam   Injectable 2 milliGRAM(s) IV Push every 1 hour PRN CIWA-Ar score 8 or greater      I&O's Summary      Vital Signs Last 24 Hrs  T(C): 36.8 (20 Jul 2023 06:30), Max: 37.1 (20 Jul 2023 02:30)  T(F): 98.3 (20 Jul 2023 06:30), Max: 98.8 (20 Jul 2023 02:30)  HR: 91 (20 Jul 2023 06:30) (91 - 98)  BP: 139/83 (20 Jul 2023 06:30) (130/85 - 155/87)  BP(mean): --  RR: 18 (20 Jul 2023 06:30) (17 - 18)  SpO2: 97% (20 Jul 2023 06:30) (97% - 100%)    Parameters below as of 20 Jul 2023 06:30  Patient On (Oxygen Delivery Method): room air        CAPILLARY BLOOD GLUCOSE          PHYSICAL EXAM:  GENERAL: NAD,  resting comfortably  HEAD:  Atraumatic, Normocephalic  EYES: EOMI, PERRL, conjunctiva nl, scleral icterus bilaterally  NECK: No JVD  CHEST/LUNG: Clear to auscultation bilaterally; No wheeze  HEART: Regular rate and rhythm; No murmurs, rubs, or gallops  ABDOMEN: Soft, Nontender, Protuberant abdomen, significantly less distended. Reducible umbilical hernia; Bowel sounds present. Dressing to right abdomen C/D/I  EXTREMITIES:  2+ Peripheral Pulses, 2+ pitting edema to lower extremities, No clubbing, cyanosis. Calves are non tender and without erythema or warmth. Left calf mildly firm  PSYCH: AAOx3  NEUROLOGY: non-focal, moving all extremities, cranial nerves intact  SKIN: Multiple areas of excoriations to extremities with scabbing      LABS:                        10.8   7.05  )-----------( 52       ( 20 Jul 2023 04:25 )             32.8     Auto Eosinophil # x     / Auto Eosinophil % x     / Auto Neutrophil # x     / Auto Neutrophil % x     / BANDS % x                            12.0   7.48  )-----------( 44       ( 19 Jul 2023 07:44 )             35.9     Auto Eosinophil # 0.08  / Auto Eosinophil % 1.1   / Auto Neutrophil # 5.98  / Auto Neutrophil % 79.8  / BANDS % x                            12.1   8.86  )-----------( 58       ( 18 Jul 2023 14:15 )             36.3     Auto Eosinophil # 0.05  / Auto Eosinophil % 0.6   / Auto Neutrophil # 7.45  / Auto Neutrophil % 84.0  / BANDS % x        07-20    136  |  99  |  9   ----------------------------<  103<H>  3.7   |  26  |  0.70  07-19    135  |  98  |  8   ----------------------------<  91  3.6   |  22  |  0.59  07-18    136  |  97<L>  |  5<L>  ----------------------------<  87  3.6   |  23  |  0.61    Ca    8.2<L>      20 Jul 2023 04:25  Mg     1.50     07-20  Phos  2.4     07-20  TPro  7.2  /  Alb  2.9<L>  /  TBili  6.1<H>  /  DBili  x   /  AST  80<H>  /  ALT  17  /  AlkPhos  327<H>  07-20  TPro  7.6  /  Alb  3.0<L>  /  TBili  9.0<H>  /  DBili  4.5<H>  /  AST  83<H>  /  ALT  16  /  AlkPhos  342<H>  07-19  TPro  7.9  /  Alb  3.4  /  TBili  7.3<H>  /  DBili  x   /  AST  91<H>  /  ALT  18  /  AlkPhos  388<H>  07-18    PT/INR - ( 20 Jul 2023 04:25 )   PT: 18.1 sec;   INR: 1.55 ratio         PTT - ( 20 Jul 2023 04:25 )  PTT:38.5 sec      Urinalysis Basic - ( 20 Jul 2023 04:25 )    Color: x / Appearance: x / SG: x / pH: x  Gluc: 103 mg/dL / Ketone: x  / Bili: x / Urobili: x   Blood: x / Protein: x / Nitrite: x   Leuk Esterase: x / RBC: x / WBC x   Sq Epi: x / Non Sq Epi: x / Bacteria: x      Lactate, Blood: 1.4 mmol/L (07-20 @ 04:25)        RADIOLOGY & ADDITIONAL TESTS:    Imaging Personally Reviewed:    Consultant(s) Notes Reviewed:      Care Discussed with Consultants/Other Providers:   Emerson Ocasio MD  PGY 1 Department of Internal Medicine        Patient is a 57y old  Male who presents with a chief complaint of Abdominal distention (19 Jul 2023 16:34)      SUBJECTIVE / OVERNIGHT EVENTS: Pt seen and examined. No acute overnight events. S/p paracentesis this morning, patient feeling much better after procedure. Denies fevers, chills, CP, SOB, Abdominal pain, N/V, Constipation, Diarrhea        MEDICATIONS  (STANDING):  acetylcysteine IVPB 15 Gram(s) IV Intermittent once  cholestyramine Powder (Sugar-Free) 4 Gram(s) Oral daily  folic acid 1 milliGRAM(s) Oral daily  furosemide    Tablet 40 milliGRAM(s) Oral daily  heparin   Injectable 5000 Unit(s) SubCutaneous every 8 hours  lidocaine 1% Injectable 20 milliLiter(s) Local Injection once  multivitamin 1 Tablet(s) Oral daily  potassium phosphate / sodium phosphate Powder (PHOS-NaK) 1 Packet(s) Oral every 6 hours  spironolactone 100 milliGRAM(s) Oral daily  thiamine 100 milliGRAM(s) Oral daily  ursodiol Tablet 500 milliGRAM(s) Oral every 12 hours    MEDICATIONS  (PRN):  acetaminophen     Tablet .. 650 milliGRAM(s) Oral every 6 hours PRN Temp greater or equal to 38C (100.4F), Mild Pain (1 - 3)  LORazepam     Tablet 2 milliGRAM(s) Oral every 2 hours PRN CIWA-Ar score increase by 2 points and a total score of 7 or less  LORazepam   Injectable 2 milliGRAM(s) IV Push every 1 hour PRN CIWA-Ar score 8 or greater      I&O's Summary      Vital Signs Last 24 Hrs  T(C): 36.8 (20 Jul 2023 06:30), Max: 37.1 (20 Jul 2023 02:30)  T(F): 98.3 (20 Jul 2023 06:30), Max: 98.8 (20 Jul 2023 02:30)  HR: 91 (20 Jul 2023 06:30) (91 - 98)  BP: 139/83 (20 Jul 2023 06:30) (130/85 - 155/87)  BP(mean): --  RR: 18 (20 Jul 2023 06:30) (17 - 18)  SpO2: 97% (20 Jul 2023 06:30) (97% - 100%)    Parameters below as of 20 Jul 2023 06:30  Patient On (Oxygen Delivery Method): room air        CAPILLARY BLOOD GLUCOSE          PHYSICAL EXAM:  GENERAL: NAD,  resting comfortably  HEAD:  Atraumatic, Normocephalic  EYES: EOMI, PERRL, conjunctiva nl, scleral icterus bilaterally  NECK: No JVD  CHEST/LUNG: Clear to auscultation bilaterally; No wheeze  HEART: Regular rate and rhythm; No murmurs, rubs, or gallops  ABDOMEN: Soft, Nontender, Protuberant abdomen, significantly less distended. Reducible umbilical hernia; Bowel sounds present. Dressing to right abdomen C/D/I  EXTREMITIES:  2+ Peripheral Pulses, 2+ pitting edema to lower extremities, No clubbing, cyanosis. Calves are non tender and without erythema or warmth. Left calf mildly firm  PSYCH: AAOx3  NEUROLOGY: non-focal, moving all extremities, cranial nerves intact  SKIN: Multiple areas of excoriations to extremities with scabbing      LABS:                        10.8   7.05  )-----------( 52       ( 20 Jul 2023 04:25 )             32.8     Auto Eosinophil # x     / Auto Eosinophil % x     / Auto Neutrophil # x     / Auto Neutrophil % x     / BANDS % x                            12.0   7.48  )-----------( 44       ( 19 Jul 2023 07:44 )             35.9     Auto Eosinophil # 0.08  / Auto Eosinophil % 1.1   / Auto Neutrophil # 5.98  / Auto Neutrophil % 79.8  / BANDS % x                            12.1   8.86  )-----------( 58       ( 18 Jul 2023 14:15 )             36.3     Auto Eosinophil # 0.05  / Auto Eosinophil % 0.6   / Auto Neutrophil # 7.45  / Auto Neutrophil % 84.0  / BANDS % x        07-20    136  |  99  |  9   ----------------------------<  103<H>  3.7   |  26  |  0.70  07-19    135  |  98  |  8   ----------------------------<  91  3.6   |  22  |  0.59  07-18    136  |  97<L>  |  5<L>  ----------------------------<  87  3.6   |  23  |  0.61    Ca    8.2<L>      20 Jul 2023 04:25  Mg     1.50     07-20  Phos  2.4     07-20  TPro  7.2  /  Alb  2.9<L>  /  TBili  6.1<H>  /  DBili  x   /  AST  80<H>  /  ALT  17  /  AlkPhos  327<H>  07-20  TPro  7.6  /  Alb  3.0<L>  /  TBili  9.0<H>  /  DBili  4.5<H>  /  AST  83<H>  /  ALT  16  /  AlkPhos  342<H>  07-19  TPro  7.9  /  Alb  3.4  /  TBili  7.3<H>  /  DBili  x   /  AST  91<H>  /  ALT  18  /  AlkPhos  388<H>  07-18    PT/INR - ( 20 Jul 2023 04:25 )   PT: 18.1 sec;   INR: 1.55 ratio         PTT - ( 20 Jul 2023 04:25 )  PTT:38.5 sec      Urinalysis Basic - ( 20 Jul 2023 04:25 )    Color: x / Appearance: x / SG: x / pH: x  Gluc: 103 mg/dL / Ketone: x  / Bili: x / Urobili: x   Blood: x / Protein: x / Nitrite: x   Leuk Esterase: x / RBC: x / WBC x   Sq Epi: x / Non Sq Epi: x / Bacteria: x      Lactate, Blood: 1.4 mmol/L (07-20 @ 04:25)        RADIOLOGY & ADDITIONAL TESTS:    Imaging Personally Reviewed:    Consultant(s) Notes Reviewed:      Care Discussed with Consultants/Other Providers:

## 2023-07-20 NOTE — DISCHARGE NOTE PROVIDER - NSFOLLOWUPCLINICS_GEN_ALL_ED_FT
[facility]  Gastroenterology  [address] [place] [address]  Phone: [phone]  Fax:      [facility]  Gastroenterology  [address] [place] [address]  Phone: [phone]  Fax:     [facility]  Internal Medicine  [address] [place] [address]  Phone: [phone]  Fax: [phone]  Follow Up Time: 2 weeks

## 2023-07-20 NOTE — PROGRESS NOTE ADULT - ASSESSMENT
56yo w/ PMHx AUD, remote hx DVT (not on AC, ?if provoked) presenting w/ jaundice and ascites in setting heavy alcohol use c/f alcoholic hepatitis +/- cirrhosis    1. C/f alcohol hepatitis  Suspect diagnosis of alcoholic hepatitis given significant ongoing alcohol use, juandice, AST >50, AST/ALT>1.5, total bili >3. Maddrey 27.4, stable. Less likely ischemic hepatitis, DILI (no new drugs outside of zquil over the course of a week). UA unremarkable and though C. diff positive, no diarrhea per patient.   -Please send blood cultures to r/o infection  -C. diff pcr positive though asypmatomatic- unlikely true infection (?colonization)  -Once cultures sent, can start CTX 1g daily for 48 hours  -Please check HIV  -C/w NAC 100mg/kg/day in 1000ml of 5% glucose after loading dose  -Ursodiol 500mg BID  -trend CBC, CMP, INR  -Alcohol cessation - pt feels he can quit easily and not currently interested in pharmacological assistance but may benefit from discussion on rehab/group programs  -CIWA scoring and symptom triggered management of withdrawal      2. Ascites  Most likely 2/2 cirrhosis (in setting AUD) based on hx and lab findings (low plts, albumin, elevated INR), though should be confirmed by imaging (pending CT A/P) and other etiologies of cirrhosis evaluated. Other possible etiology of ascites is clot (remote hx of DVT). Less likely 2/2 nephrosis vs. HF based on imaging here. Clinically is volume up w/ ?asterixis on exam though no overt signs of HE. Will need screening EGD.  -Please send MARTA, anti-smooth muscle Ab, IgG level, ceruloplasmin levels  -F/u ascitic fluids today  -F/u CT A/P w/ IVC   -C/w 40mg, aldactone 100mg for diuresis  -Can give miralax to ensure BM and ongoing eval for HE- low threshold to start lactulose   -Will need screening EGD- likely while inpatient though will defer until infectious w/u complete  Note incomplete until finalized by attending signature/attestation.    Monica Hale  GI/Hepatology Fellow PGY5    NON-URGENT CONSULTS:  Please email [email or web address]  AT NIGHT AND ON WEEKENDS:  Available on Microsoft Teams  [phone] (Long Range Pager)    After 5pm, please contact the on-call GI fellow. [phone]     58yo w/ PMHx AUD, remote hx DVT (not on AC, ?if provoked) presenting w/ jaundice and ascites in setting heavy alcohol use c/f alcoholic hepatitis +/- cirrhosis    1. C/f alcohol hepatitis  Suspect diagnosis of alcoholic hepatitis given significant ongoing alcohol use, juandice, AST >50, AST/ALT>1.5, total bili >3. Maddrey 27.4, stable. Less likely ischemic hepatitis, DILI (no new drugs outside of zquil over the course of a week). UA unremarkable and though C. diff positive, no diarrhea per patient.   -Please send blood cultures to r/o infection  -C. diff pcr positive though asypmatomatic- unlikely true infection (?colonization)  -Once cultures sent, can start CTX 1g daily for 48 hours  -Please check HIV  -C/w NAC 100mg/kg/day in 1000ml of 5% glucose after loading dose  -Ursodiol 500mg BID  -trend CBC, CMP, INR  -Alcohol cessation - pt feels he can quit easily and not currently interested in pharmacological assistance but may benefit from discussion on rehab/group programs  -CIWA scoring and symptom triggered management of withdrawal      2. Ascites, likely 2/2 decompensated alcoholic cirrhosis.   MELD 20, B+  Most likely 2/2 cirrhosis (in setting AUD) based on hx and lab findings (low plts, albumin, elevated INR), though should be confirmed by imaging (pending CT A/P) and other etiologies of cirrhosis evaluated. Other possible etiology of ascites is clot (remote hx of DVT). Less likely 2/2 nephrosis vs. HF based on imaging here. Clinically is volume up w/ ?asterixis on exam though no overt signs of HE. Will need screening EGD.  -Send Hep A Ab levels  -F/u ascitic fluids today  -F/u CT A/P w/ IVC   -C/w 40mg, aldactone 100mg for diuresis  -Can give miralax to ensure BM and ongoing eval for HE- low threshold to start lactulose   -Will need screening EGD- likely while inpatient though will defer until infectious w/u complete  -Will begin eval for OLT   Note incomplete until finalized by attending signature/attestation.    Monica Hale  GI/Hepatology Fellow PGY5    NON-URGENT CONSULTS:  Please email [email or web address]  AT NIGHT AND ON WEEKENDS:  Available on [employer] Teams  [phone] (Long Range Pager)    After 5pm, please contact the on-call JUSTYNA fellow. [phone]     58yo w/ PMHx AUD, remote hx DVT (not on AC, ?if provoked) presenting w/ jaundice and ascites in setting heavy alcohol use c/f alcoholic hepatitis +/- cirrhosis    1. C/f alcohol hepatitis  Suspect diagnosis of alcoholic hepatitis given significant ongoing alcohol use, juandice, AST >50, AST/ALT>1.5, total bili >3. Maddrey 27.4, stable. Less likely ischemic hepatitis, DILI (no new drugs outside of zquil over the course of a week). UA unremarkable and though C. diff positive, no diarrhea per patient.   -Please send blood cultures to r/o infection  -C. diff pcr positive though asypmatomatic- unlikely true infection (?colonization)  -Once cultures sent, can start CTX 1g daily for 48 hours  -Please check HIV  -C/w NAC 100mg/kg/day in 1000ml of 5% glucose after loading dose  -Ursodiol 500mg BID  -trend CBC, CMP, INR  -Alcohol cessation - pt feels he can quit easily and not currently interested in pharmacological assistance but may benefit from discussion on rehab/group programs  -CIWA scoring and symptom triggered management of withdrawal      2. Ascites, likely 2/2 decompensated alcoholic cirrhosis.   MELD 20, B+  Most likely 2/2 cirrhosis (in setting AUD) based on hx and lab findings (low plts, albumin, elevated INR), though should be confirmed by imaging (pending CT A/P) and other etiologies of cirrhosis evaluated. Other possible etiology of ascites is clot (remote hx of DVT). Less likely 2/2 nephrosis vs. HF based on imaging here. Clinically is volume up w/ ?asterixis on exam though no overt signs of HE. Will need screening EGD.  -Send Hep A Ab levels  -F/u ascitic fluids today  -F/u CT A/P w/ IVC   -C/w 40mg, aldactone 100mg for diuresis  -Please check urine Na  -Can give miralax to ensure BM and ongoing eval for HE- low threshold to start lactulose   -Will need screening EGD- likely while inpatient though will defer until infectious w/u complete  -Will begin eval for OLT   Note incomplete until finalized by attending signature/attestation.    Monica Hale  GI/Hepatology Fellow PGY5    NON-URGENT CONSULTS:  Please email [email or web address]  AT NIGHT AND ON WEEKENDS:  Available on [employer] Teams  [phone] (Long Range Pager)    After 5pm, please contact the on-call GI fellow. [phone]

## 2023-07-20 NOTE — DISCHARGE NOTE PROVIDER - HOSPITAL COURSE
Mr. Tang is a 56 YO M with PMH of EtOH abuse (3-5 double [place] whiskey 5 days a week x25 years) and DVT, who presents for abdominal distension, diarrhea, b/l lower extremity swelling, concerning for tense ascites. US abdomen showed large ascites, and Invasive Procedure Team conducted diagnostic and therapeutic paracentesis with removal of 16 L of hazy, yellow fluid from the abdomen. Fluid studies of ascites was not concerning for SBP, consistent with ascites. RUQ US consistent with cirrhosis. Patient was evaluated by hepatology, who started patient on bowel regimen and diuresis. [ ADDITIONAL WORK UP ]     For his lower extremity swelling, he underwent US Doppler of his lower extremities, which showed no DVT.     He tested positive for C. diff, however did not have further episodes of diarrhea, so was monitored off antibiotics.     On the day of discharge, he is hemodynamically stable and medically optimized for discharge to home. He has refused alcohol cessation programs, noting that he will self-discontinue alcohol use. He will follow up as outpatient with his PCP and GI for screening EGD. Mr. Tang is a 58 YO M with PMH of EtOH abuse (3-5 double [place] whiskey 5 days a week x25 years) and DVT, who presents for abdominal distension, diarrhea, b/l lower extremity swelling, concerning for tense ascites. US abdomen showed large ascites and cirrhosis, and Invasive Procedure Team conducted diagnostic and therapeutic paracentesis with removal of 16 L of hazy, yellow fluid from the abdomen. Fluid studies of ascites was not concerning for SBP, SAAG 1.8, consistent with ascites in the setting of alcoholic cirrhosis. Patient was evaluated by hepatology, who started patient on bowel regimen and diuresis. [ ADDITIONAL WORK UP ]     He had a CT abdomen, which showed ***    For his lower extremity swelling, he underwent US Doppler of his lower extremities, which showed no DVT. He had an echocardiogram which showed mild diastolic dysfunction and EF 61%.    He tested positive for C. diff, however did not have further episodes of diarrhea, so was monitored off antibiotics. He had electrolyte abnormalities (hypokalemia, hypomagnesemia, and hypophosphatemia) determined to be in setting of alcohol use which were repleted.     On the day of discharge, he is hemodynamically stable and medically optimized for discharge to home. He has refused alcohol cessation programs, noting that he will self-discontinue alcohol use. He will follow up as outpatient with his PCP and GI for screening EGD. Mr. Tang is a 58 YO M with PMH of EtOH abuse (3-5 double [place] whiskey 5 days a week x25 years) and DVT, who presents for abdominal distension, diarrhea, b/l lower extremity swelling, concerning for tense ascites. US abdomen showed large ascites and cirrhosis, and Invasive Procedure Team conducted diagnostic and therapeutic paracentesis with removal of 16 L of hazy, yellow fluid from the abdomen. Fluid studies of ascites was not concerning for SBP, SAAG 1.8, consistent with ascites in the setting of alcoholic cirrhosis. Patient was evaluated by hepatology, who started patient on bowel regimen and diuresis. [ ADDITIONAL WORK UP ]     He had a CT abdomen, which showed ***    For his lower extremity swelling, he underwent US Doppler of his lower extremities, which showed no DVT. He had an echocardiogram which showed mild diastolic dysfunction and EF 61%.    He tested positive for C. diff, however did not have further episodes of diarrhea, so was monitored off antibiotics.     On the day of discharge, he is hemodynamically stable and medically optimized for discharge to home. He has refused alcohol cessation programs, noting that he will self-discontinue alcohol use. He will follow up as outpatient with his PCP and GI for screening EGD. Mr. Tang is a 56 YO M with PMH of EtOH abuse (3-5 double [place] whiskey 5 days a week x25 years) and DVT, who presents for abdominal distension, diarrhea, b/l lower extremity swelling, concerning for tense ascites. US abdomen showed large ascites and cirrhosis, and Invasive Procedure Team conducted diagnostic and therapeutic paracentesis on 7/20 with removal of 16 L of hazy, yellow fluid from the abdomen. Fluid studies of ascites was not concerning for SBP, SAAG 1.8, consistent with ascites in the setting of alcoholic cirrhosis. Patient was evaluated by hepatology, who started patient on bowel regimen and diuresis, as well as steroids for elevated MELD.    He had a CT abdomen with contrast which showed no evidence of portal vein thrombosis. For his lower extremity swelling, he underwent US Doppler of his lower extremities, which showed no DVT. He had an echocardiogram which showed mild diastolic dysfunction and EF 61%.    He tested positive for C. diff, however did not have further episodes of diarrhea, so was monitored off antibiotics.     He reaccumulated ascites, and underwent repeat paracentesis on 7/27.     On the day of discharge, he is hemodynamically stable and medically optimized for discharge to home. He has refused alcohol cessation programs, noting that he will self-discontinue alcohol use. He will follow up as outpatient with his PCP and GI for screening EGD, as well as IR for outpatient paracentesis.

## 2023-07-20 NOTE — DISCHARGE NOTE PROVIDER - CARE PROVIDER_API CALL
internal, medicine  Phone: (   )    -  Fax: (   )    -  Follow Up Time:    Mynor Simental  Internal Medicine  [address]  [place] [address]  Phone: [phone]  Fax: [phone]  Follow Up Time: 1 week

## 2023-07-20 NOTE — PROGRESS NOTE ADULT - PROBLEM SELECTOR PLAN 9
Lipid panel with . ASCVD 10yr risk 13%. Would benefit from starting moderate intensity statin, will also start cholestyramine for pruritic rash likely 2/2 bilirubin.  -start 4g cholestyramine daily  -will discuss starting moderate intensity statin Lipid panel with .   -c/w 4g cholestyramine daily

## 2023-07-21 LAB
ALBUMIN SERPL ELPH-MCNC: 2.8 G/DL — LOW (ref 3.3–5)
ALP SERPL-CCNC: 236 U/L — HIGH (ref 40–120)
ALT FLD-CCNC: 13 U/L — SIGNIFICANT CHANGE UP (ref 4–41)
ANA PAT FLD IF-IMP: ABNORMAL
ANA TITR SER: ABNORMAL
ANION GAP SERPL CALC-SCNC: 15 MMOL/L — HIGH (ref 7–14)
APTT BLD: 39.3 SEC — HIGH (ref 27–36.3)
AST SERPL-CCNC: 55 U/L — HIGH (ref 4–40)
BILIRUB SERPL-MCNC: 5.5 MG/DL — HIGH (ref 0.2–1.2)
BUN SERPL-MCNC: 6 MG/DL — LOW (ref 7–23)
CALCIUM SERPL-MCNC: 8 MG/DL — LOW (ref 8.4–10.5)
CHLORIDE SERPL-SCNC: 98 MMOL/L — SIGNIFICANT CHANGE UP (ref 98–107)
CO2 SERPL-SCNC: 22 MMOL/L — SIGNIFICANT CHANGE UP (ref 22–31)
CREAT SERPL-MCNC: 0.59 MG/DL — SIGNIFICANT CHANGE UP (ref 0.5–1.3)
CULTURE RESULTS: SIGNIFICANT CHANGE UP
CULTURE RESULTS: SIGNIFICANT CHANGE UP
EGFR: 114 ML/MIN/1.73M2 — SIGNIFICANT CHANGE UP
FOLATE SERPL-MCNC: 6.8 NG/ML — SIGNIFICANT CHANGE UP (ref 3.1–17.5)
GLUCOSE SERPL-MCNC: 109 MG/DL — HIGH (ref 70–99)
HAV IGG SER QL IA: SIGNIFICANT CHANGE UP
HCT VFR BLD CALC: 32.1 % — LOW (ref 39–50)
HGB BLD-MCNC: 11.1 G/DL — LOW (ref 13–17)
HIV 1+2 AB+HIV1 P24 AG SERPL QL IA: SIGNIFICANT CHANGE UP
INR BLD: 1.91 RATIO — HIGH (ref 0.88–1.16)
MAGNESIUM SERPL-MCNC: 1.3 MG/DL — LOW (ref 1.6–2.6)
MCHC RBC-ENTMCNC: 34.6 GM/DL — SIGNIFICANT CHANGE UP (ref 32–36)
MCHC RBC-ENTMCNC: 35.4 PG — HIGH (ref 27–34)
MCV RBC AUTO: 102.2 FL — HIGH (ref 80–100)
MELD SCORE WITH DIALYSIS: 33 POINTS — SIGNIFICANT CHANGE UP
MELD SCORE WITHOUT DIALYSIS: 21 POINTS — SIGNIFICANT CHANGE UP
NRBC # BLD: 0 /100 WBCS — SIGNIFICANT CHANGE UP (ref 0–0)
NRBC # FLD: 0 K/UL — SIGNIFICANT CHANGE UP (ref 0–0)
PHOSPHATE SERPL-MCNC: 2.5 MG/DL — SIGNIFICANT CHANGE UP (ref 2.5–4.5)
PLATELET # BLD AUTO: 50 K/UL — LOW (ref 150–400)
POTASSIUM SERPL-MCNC: 3.5 MMOL/L — SIGNIFICANT CHANGE UP (ref 3.5–5.3)
POTASSIUM SERPL-SCNC: 3.5 MMOL/L — SIGNIFICANT CHANGE UP (ref 3.5–5.3)
PROT SERPL-MCNC: 6.5 G/DL — SIGNIFICANT CHANGE UP (ref 6–8.3)
PROTHROM AB SERPL-ACNC: 22.3 SEC — HIGH (ref 10.5–13.4)
RBC # BLD: 3.14 M/UL — LOW (ref 4.2–5.8)
RBC # FLD: 14.9 % — HIGH (ref 10.3–14.5)
SMOOTH MUSCLE AB SER-ACNC: SIGNIFICANT CHANGE UP
SODIUM SERPL-SCNC: 135 MMOL/L — SIGNIFICANT CHANGE UP (ref 135–145)
SPECIMEN SOURCE: SIGNIFICANT CHANGE UP
SPECIMEN SOURCE: SIGNIFICANT CHANGE UP
VIT B12 SERPL-MCNC: 671 PG/ML — SIGNIFICANT CHANGE UP (ref 200–900)
WBC # BLD: 6.54 K/UL — SIGNIFICANT CHANGE UP (ref 3.8–10.5)
WBC # FLD AUTO: 6.54 K/UL — SIGNIFICANT CHANGE UP (ref 3.8–10.5)

## 2023-07-21 PROCEDURE — 99232 SBSQ HOSP IP/OBS MODERATE 35: CPT

## 2023-07-21 PROCEDURE — 99232 SBSQ HOSP IP/OBS MODERATE 35: CPT | Mod: GC

## 2023-07-21 PROCEDURE — 74175 CTA ABDOMEN W/CONTRAST: CPT | Mod: 26

## 2023-07-21 RX ORDER — LACTULOSE 10 G/15ML
10 SOLUTION ORAL
Refills: 0 | Status: DISCONTINUED | OUTPATIENT
Start: 2023-07-21 | End: 2023-07-23

## 2023-07-21 RX ORDER — ACETYLCYSTEINE 200 MG/ML
10 VIAL (ML) MISCELLANEOUS ONCE
Refills: 0 | Status: COMPLETED | OUTPATIENT
Start: 2023-07-22 | End: 2023-07-22

## 2023-07-21 RX ORDER — LACTULOSE 10 G/15ML
20 SOLUTION ORAL
Refills: 0 | Status: DISCONTINUED | OUTPATIENT
Start: 2023-07-21 | End: 2023-07-21

## 2023-07-21 RX ORDER — MAGNESIUM SULFATE 500 MG/ML
2 VIAL (ML) INJECTION ONCE
Refills: 0 | Status: COMPLETED | OUTPATIENT
Start: 2023-07-21 | End: 2023-07-21

## 2023-07-21 RX ORDER — ACETYLCYSTEINE 200 MG/ML
10 VIAL (ML) MISCELLANEOUS ONCE
Refills: 0 | Status: COMPLETED | OUTPATIENT
Start: 2023-07-21 | End: 2023-07-21

## 2023-07-21 RX ORDER — PREDNISOLONE 5 MG
40 TABLET ORAL DAILY
Refills: 0 | Status: DISCONTINUED | OUTPATIENT
Start: 2023-07-21 | End: 2023-07-28

## 2023-07-21 RX ORDER — SODIUM,POTASSIUM PHOSPHATES 278-250MG
1 POWDER IN PACKET (EA) ORAL ONCE
Refills: 0 | Status: COMPLETED | OUTPATIENT
Start: 2023-07-21 | End: 2023-07-21

## 2023-07-21 RX ORDER — ACETYLCYSTEINE 200 MG/ML
10 VIAL (ML) MISCELLANEOUS ONCE
Refills: 0 | Status: DISCONTINUED | OUTPATIENT
Start: 2023-07-21 | End: 2023-07-21

## 2023-07-21 RX ADMIN — CHOLESTYRAMINE 4 GRAM(S): 4 POWDER, FOR SUSPENSION ORAL at 09:42

## 2023-07-21 RX ADMIN — URSODIOL 500 MILLIGRAM(S): 250 TABLET, FILM COATED ORAL at 18:01

## 2023-07-21 RX ADMIN — HEPARIN SODIUM 5000 UNIT(S): 5000 INJECTION INTRAVENOUS; SUBCUTANEOUS at 21:05

## 2023-07-21 RX ADMIN — Medication 1 PACKET(S): at 11:13

## 2023-07-21 RX ADMIN — LACTULOSE 10 GRAM(S): 10 SOLUTION ORAL at 18:49

## 2023-07-21 RX ADMIN — SPIRONOLACTONE 100 MILLIGRAM(S): 25 TABLET, FILM COATED ORAL at 05:52

## 2023-07-21 RX ADMIN — Medication 25 GRAM(S): at 11:06

## 2023-07-21 RX ADMIN — HEPARIN SODIUM 5000 UNIT(S): 5000 INJECTION INTRAVENOUS; SUBCUTANEOUS at 05:51

## 2023-07-21 RX ADMIN — POLYETHYLENE GLYCOL 3350 17 GRAM(S): 17 POWDER, FOR SOLUTION ORAL at 11:13

## 2023-07-21 RX ADMIN — Medication 65.63 GRAM(S): at 14:18

## 2023-07-21 RX ADMIN — Medication 40 MILLIGRAM(S): at 18:01

## 2023-07-21 RX ADMIN — HEPARIN SODIUM 5000 UNIT(S): 5000 INJECTION INTRAVENOUS; SUBCUTANEOUS at 14:20

## 2023-07-21 RX ADMIN — URSODIOL 500 MILLIGRAM(S): 250 TABLET, FILM COATED ORAL at 05:51

## 2023-07-21 RX ADMIN — Medication 40 MILLIGRAM(S): at 05:51

## 2023-07-21 NOTE — PROGRESS NOTE ADULT - ATTENDING COMMENTS
57-year-old male with a history of alcohol use disorder (AUD) and remote deep vein thrombosis (DVT), presenting with jaundice and ascites, suggestive of alcoholic hepatitis with possible cirrhosis.    Following interval observations were made:  - Blood and urine cultures has been negative. A urinalysis (UA) was unremarkable, and the positive C. diff PCR, despite being asymptomatic, may represent colonization.  -s/p Para with > 14 L fluid removed. Ascitic fluid analysis negative for SBP.  -CT abd/pelvis (7.21)- preliminary reports suggest Cirrhosis with moderate ascites. Splenomegaly with gastroesophageal varices suggestive of portal hypertension. Portal vein is patent.    Recommendations:    -Start Prednisolone 40 mg daily (7/21-).  -Keep on IV N-acetylcysteine (NAC)   -Keep on ursodiol 500mg BID to further aid liver function.  -Monitor trends in complete blood count (CBC), comprehensive metabolic panel (CMP), and international normalized ratio (INR) for liver function and coagulation status.  -Encourage the patient to quit alcohol and offer support for alcohol cessation.  -Implement the Clinical [place] Withdrawal Assessment for Alcohol (CIWA) scoring for symptom-triggered management of alcohol withdrawal.  -Recommend a 2D ECHO to rule out CHF  -Initiate furosemide 40mg and spironolactone 100mg for diuresis to manage fluid overload. Check Urine Na to assist in management of ascites.  -Schedule a screening EGD to assess for esophageal varices. This procedure can be done as an outpatient.    Transplant candidacy:  Patient will benefit with transplant evaluation. We will plan to open a formal evaluation as an outpatient. Formal referral has been placed.     Ensure close monitoring of the patient's response to treatment and adjust the plan based on further investigations and clinical progress .

## 2023-07-21 NOTE — PROGRESS NOTE ADULT - SUBJECTIVE AND OBJECTIVE BOX
Interval Events:   -S/p NAC  -16.3L out w/ para yesterday, albumin 100ml x4 replaced. Fluid studies c/w portal HTN  -U/S showing patent veins  Patient denies any abdominal pain, nausea /vomiting, diarrhea or melena / bloody bm. Patient mental status   -Sheila 192  MELD 23, MdF 46.4 today    Hospital Medications:  acetaminophen     Tablet .. 650 milliGRAM(s) Oral every 6 hours PRN  calamine/zinc oxide Lotion 1 Application(s) Topical daily PRN  cholestyramine Powder (Sugar-Free) 4 Gram(s) Oral daily  folic acid 1 milliGRAM(s) Oral daily  furosemide    Tablet 40 milliGRAM(s) Oral daily  heparin   Injectable 5000 Unit(s) SubCutaneous every 8 hours  LORazepam     Tablet 2 milliGRAM(s) Oral every 2 hours PRN  LORazepam   Injectable 2 milliGRAM(s) IV Push every 1 hour PRN  magnesium sulfate  IVPB 2 Gram(s) IV Intermittent once  multivitamin 1 Tablet(s) Oral daily  polyethylene glycol 3350 17 Gram(s) Oral daily  spironolactone 100 milliGRAM(s) Oral daily  thiamine 100 milliGRAM(s) Oral daily  ursodiol Tablet 500 milliGRAM(s) Oral every 12 hours      ROS: All system reviewed and negative except as mentioned above.    PHYSICAL EXAM:   Vital Signs:  Vital Signs Last 24 Hrs  T(C): 36.9 (21 Jul 2023 06:05), Max: 37 (20 Jul 2023 14:00)  T(F): 98.4 (21 Jul 2023 06:05), Max: 98.6 (20 Jul 2023 14:00)  HR: 97 (21 Jul 2023 06:05) (90 - 97)  BP: 129/76 (21 Jul 2023 06:05) (129/76 - 148/74)  BP(mean): --  RR: 18 (21 Jul 2023 06:05) (18 - 18)  SpO2: 96% (21 Jul 2023 06:05) (96% - 99%)    Parameters below as of 21 Jul 2023 06:05  Patient On (Oxygen Delivery Method): room air      Daily     Daily     GENERAL:  NAD, Appears stated age  HEENT:  NC/AT,  conjunctivae clear and pink, sclera -anicteric  CHEST:  Normal Effort, Breath sounds clear  HEART:  RRR, S1 + S2, no murmurs  ABDOMEN:  Soft, non-tender, non-distended, normoactive bowel sounds,  no masses  EXTREMITIES:  no cyanosis or edema  SKIN:  Warm & Dry. No rash or erythema  NEURO:  Alert, oriented, no focal deficit    LABS:                        11.1   6.54  )-----------( 50       ( 21 Jul 2023 05:50 )             32.1     Mean Cell Volume: 102.2 fL (07-21-23 @ 05:50)    07-21    135  |  98  |  6<L>  ----------------------------<  109<H>  3.5   |  22  |  0.59    Ca    8.0<L>      21 Jul 2023 05:50  Phos  2.5     07-21  Mg     1.30     07-21    TPro  6.5  /  Alb  2.8<L>  /  TBili  5.5<H>  /  DBili  x   /  AST  55<H>  /  ALT  13  /  AlkPhos  236<H>  07-21    LIVER FUNCTIONS - ( 21 Jul 2023 05:50 )  Alb: 2.8 g/dL / Pro: 6.5 g/dL / ALK PHOS: 236 U/L / ALT: 13 U/L / AST: 55 U/L / GGT: x           PT/INR - ( 21 Jul 2023 05:50 )   PT: 22.3 sec;   INR: 1.91 ratio         PTT - ( 21 Jul 2023 05:50 )  PTT:39.3 sec  Urinalysis Basic - ( 21 Jul 2023 05:50 )    Color: x / Appearance: x / SG: x / pH: x  Gluc: 109 mg/dL / Ketone: x  / Bili: x / Urobili: x   Blood: x / Protein: x / Nitrite: x   Leuk Esterase: x / RBC: x / WBC x   Sq Epi: x / Non Sq Epi: x / Bacteria: x                              11.1   6.54  )-----------( 50       ( 21 Jul 2023 05:50 )             32.1                         10.8   7.05  )-----------( 52       ( 20 Jul 2023 04:25 )             32.8                         12.0   7.48  )-----------( 44       ( 19 Jul 2023 07:44 )             35.9                         12.1   8.86  )-----------( 58       ( 18 Jul 2023 14:15 )             36.3       Imaging: Images reviewed.     Interval Events:   -S/p NAC  -16.3L out w/ para yesterday, albumin 100ml x4 replaced. Fluid studies c/w portal HTN  -U/S showing patent veins  Patient denies any abdominal pain, nausea /vomiting, diarrhea or melena / bloody bm. Patient mental status   -Sheila 192  MELD 23, MdF 46.4 today    Hospital Medications:  acetaminophen     Tablet .. 650 milliGRAM(s) Oral every 6 hours PRN  calamine/zinc oxide Lotion 1 Application(s) Topical daily PRN  cholestyramine Powder (Sugar-Free) 4 Gram(s) Oral daily  folic acid 1 milliGRAM(s) Oral daily  furosemide    Tablet 40 milliGRAM(s) Oral daily  heparin   Injectable 5000 Unit(s) SubCutaneous every 8 hours  LORazepam     Tablet 2 milliGRAM(s) Oral every 2 hours PRN  LORazepam   Injectable 2 milliGRAM(s) IV Push every 1 hour PRN  magnesium sulfate  IVPB 2 Gram(s) IV Intermittent once  multivitamin 1 Tablet(s) Oral daily  polyethylene glycol 3350 17 Gram(s) Oral daily  spironolactone 100 milliGRAM(s) Oral daily  thiamine 100 milliGRAM(s) Oral daily  ursodiol Tablet 500 milliGRAM(s) Oral every 12 hours      ROS: All system reviewed and negative except as mentioned above.    PHYSICAL EXAM:   Vital Signs:  Vital Signs Last 24 Hrs  T(C): 36.9 (21 Jul 2023 06:05), Max: 37 (20 Jul 2023 14:00)  T(F): 98.4 (21 Jul 2023 06:05), Max: 98.6 (20 Jul 2023 14:00)  HR: 97 (21 Jul 2023 06:05) (90 - 97)  BP: 129/76 (21 Jul 2023 06:05) (129/76 - 148/74)  BP(mean): --  RR: 18 (21 Jul 2023 06:05) (18 - 18)  SpO2: 96% (21 Jul 2023 06:05) (96% - 99%)    Parameters below as of 21 Jul 2023 06:05  Patient On (Oxygen Delivery Method): room air      Daily     Daily     GENERAL:  Non toxic, diffusely jaundiced  HEENT:  NC/AT,  conjunctivae clear and pink, sclera icteric  CHEST:  Normal Effort, Breath sounds clear  HEART:  RRR, S1 + S2, no murmurs  ABDOMEN:  +ascites but soft, much improved from prior exam  EXTREMITIES:  no cyanosis or edema  SKIN:  Warm & Dry. No rash or erythema  NEURO:  Alert, oriented, no focal deficit, +asterixis    LABS:                        11.1   6.54  )-----------( 50       ( 21 Jul 2023 05:50 )             32.1     Mean Cell Volume: 102.2 fL (07-21-23 @ 05:50)    07-21    135  |  98  |  6<L>  ----------------------------<  109<H>  3.5   |  22  |  0.59    Ca    8.0<L>      21 Jul 2023 05:50  Phos  2.5     07-21  Mg     1.30     07-21    TPro  6.5  /  Alb  2.8<L>  /  TBili  5.5<H>  /  DBili  x   /  AST  55<H>  /  ALT  13  /  AlkPhos  236<H>  07-21    LIVER FUNCTIONS - ( 21 Jul 2023 05:50 )  Alb: 2.8 g/dL / Pro: 6.5 g/dL / ALK PHOS: 236 U/L / ALT: 13 U/L / AST: 55 U/L / GGT: x           PT/INR - ( 21 Jul 2023 05:50 )   PT: 22.3 sec;   INR: 1.91 ratio         PTT - ( 21 Jul 2023 05:50 )  PTT:39.3 sec  Urinalysis Basic - ( 21 Jul 2023 05:50 )    Color: x / Appearance: x / SG: x / pH: x  Gluc: 109 mg/dL / Ketone: x  / Bili: x / Urobili: x   Blood: x / Protein: x / Nitrite: x   Leuk Esterase: x / RBC: x / WBC x   Sq Epi: x / Non Sq Epi: x / Bacteria: x                              11.1   6.54  )-----------( 50       ( 21 Jul 2023 05:50 )             32.1                         10.8   7.05  )-----------( 52       ( 20 Jul 2023 04:25 )             32.8                         12.0   7.48  )-----------( 44       ( 19 Jul 2023 07:44 )             35.9                         12.1   8.86  )-----------( 58       ( 18 Jul 2023 14:15 )             36.3       Imaging: Images reviewed.     Interval Events:   -S/p NAC  -16.3L out w/ para yesterday, albumin 100ml x4 replaced. Fluid studies c/w portal HTN  -U/S showing patent veins  Patient denies any abdominal pain, nausea /vomiting, diarrhea or melena / bloody bm. Patient mental status   -Sheila 192  MELD 23 (from 21, increased INR), MdF 46.4 from today    Hospital Medications:  acetaminophen     Tablet .. 650 milliGRAM(s) Oral every 6 hours PRN  calamine/zinc oxide Lotion 1 Application(s) Topical daily PRN  cholestyramine Powder (Sugar-Free) 4 Gram(s) Oral daily  folic acid 1 milliGRAM(s) Oral daily  furosemide    Tablet 40 milliGRAM(s) Oral daily  heparin   Injectable 5000 Unit(s) SubCutaneous every 8 hours  LORazepam     Tablet 2 milliGRAM(s) Oral every 2 hours PRN  LORazepam   Injectable 2 milliGRAM(s) IV Push every 1 hour PRN  magnesium sulfate  IVPB 2 Gram(s) IV Intermittent once  multivitamin 1 Tablet(s) Oral daily  polyethylene glycol 3350 17 Gram(s) Oral daily  spironolactone 100 milliGRAM(s) Oral daily  thiamine 100 milliGRAM(s) Oral daily  ursodiol Tablet 500 milliGRAM(s) Oral every 12 hours      ROS: All system reviewed and negative except as mentioned above.    PHYSICAL EXAM:   Vital Signs:  Vital Signs Last 24 Hrs  T(C): 36.9 (21 Jul 2023 06:05), Max: 37 (20 Jul 2023 14:00)  T(F): 98.4 (21 Jul 2023 06:05), Max: 98.6 (20 Jul 2023 14:00)  HR: 97 (21 Jul 2023 06:05) (90 - 97)  BP: 129/76 (21 Jul 2023 06:05) (129/76 - 148/74)  BP(mean): --  RR: 18 (21 Jul 2023 06:05) (18 - 18)  SpO2: 96% (21 Jul 2023 06:05) (96% - 99%)    Parameters below as of 21 Jul 2023 06:05  Patient On (Oxygen Delivery Method): room air      Daily     Daily     GENERAL:  Non toxic, diffusely jaundiced  HEENT:  NC/AT,  conjunctivae clear and pink, sclera icteric  CHEST:  Normal Effort, Breath sounds clear  HEART:  RRR, S1 + S2, no murmurs  ABDOMEN:  +ascites but soft, much improved from prior exam  EXTREMITIES:  no cyanosis or edema  SKIN:  Warm & Dry. No rash or erythema  NEURO:  Alert, oriented, no focal deficit, +asterixis    LABS:                        11.1   6.54  )-----------( 50       ( 21 Jul 2023 05:50 )             32.1     Mean Cell Volume: 102.2 fL (07-21-23 @ 05:50)    07-21    135  |  98  |  6<L>  ----------------------------<  109<H>  3.5   |  22  |  0.59    Ca    8.0<L>      21 Jul 2023 05:50  Phos  2.5     07-21  Mg     1.30     07-21    TPro  6.5  /  Alb  2.8<L>  /  TBili  5.5<H>  /  DBili  x   /  AST  55<H>  /  ALT  13  /  AlkPhos  236<H>  07-21    LIVER FUNCTIONS - ( 21 Jul 2023 05:50 )  Alb: 2.8 g/dL / Pro: 6.5 g/dL / ALK PHOS: 236 U/L / ALT: 13 U/L / AST: 55 U/L / GGT: x           PT/INR - ( 21 Jul 2023 05:50 )   PT: 22.3 sec;   INR: 1.91 ratio         PTT - ( 21 Jul 2023 05:50 )  PTT:39.3 sec  Urinalysis Basic - ( 21 Jul 2023 05:50 )    Color: x / Appearance: x / SG: x / pH: x  Gluc: 109 mg/dL / Ketone: x  / Bili: x / Urobili: x   Blood: x / Protein: x / Nitrite: x   Leuk Esterase: x / RBC: x / WBC x   Sq Epi: x / Non Sq Epi: x / Bacteria: x                              11.1   6.54  )-----------( 50       ( 21 Jul 2023 05:50 )             32.1                         10.8   7.05  )-----------( 52       ( 20 Jul 2023 04:25 )             32.8                         12.0   7.48  )-----------( 44       ( 19 Jul 2023 07:44 )             35.9                         12.1   8.86  )-----------( 58       ( 18 Jul 2023 14:15 )             36.3       Imaging: Images reviewed.     Interval Events:   -S/p NAC  -16.3L out w/ para yesterday, albumin 100ml x4 replaced. Fluid studies c/w portal HTN  -U/S showing patent veins  Patient denies any abdominal pain, nausea /vomiting, diarrhea or melena / bloody bm. Patient mental status   -Sheila 192  MELD 23 (from 21, increased INR), MdF 46.4 from today    Hospital Medications:  acetaminophen     Tablet .. 650 milliGRAM(s) Oral every 6 hours PRN  calamine/zinc oxide Lotion 1 Application(s) Topical daily PRN  cholestyramine Powder (Sugar-Free) 4 Gram(s) Oral daily  folic acid 1 milliGRAM(s) Oral daily  furosemide    Tablet 40 milliGRAM(s) Oral daily  heparin   Injectable 5000 Unit(s) SubCutaneous every 8 hours  LORazepam     Tablet 2 milliGRAM(s) Oral every 2 hours PRN  LORazepam   Injectable 2 milliGRAM(s) IV Push every 1 hour PRN  magnesium sulfate  IVPB 2 Gram(s) IV Intermittent once  multivitamin 1 Tablet(s) Oral daily  polyethylene glycol 3350 17 Gram(s) Oral daily  spironolactone 100 milliGRAM(s) Oral daily  thiamine 100 milliGRAM(s) Oral daily  ursodiol Tablet 500 milliGRAM(s) Oral every 12 hours      ROS: All system reviewed and negative except as mentioned above.    PHYSICAL EXAM:   Vital Signs:  Vital Signs Last 24 Hrs  T(C): 36.9 (21 Jul 2023 06:05), Max: 37 (20 Jul 2023 14:00)  T(F): 98.4 (21 Jul 2023 06:05), Max: 98.6 (20 Jul 2023 14:00)  HR: 97 (21 Jul 2023 06:05) (90 - 97)  BP: 129/76 (21 Jul 2023 06:05) (129/76 - 148/74)  BP(mean): --  RR: 18 (21 Jul 2023 06:05) (18 - 18)  SpO2: 96% (21 Jul 2023 06:05) (96% - 99%)    Parameters below as of 21 Jul 2023 06:05  Patient On (Oxygen Delivery Method): room air      Daily     Daily     GENERAL:  Non toxic, diffusely jaundiced  HEENT:  NC/AT,  conjunctivae clear and pink, sclera icteric  CHEST:  Normal Effort, Breath sounds clear  HEART:  RRR, S1 + S2, no murmurs  ABDOMEN:  +ascites but soft, much improved from prior exam  EXTREMITIES:  no cyanosis or edema  SKIN:  Warm & Dry. No rash or erythema  NEURO:  Alert, oriented, no focal deficit, slight tremor but no asterixis    LABS:                        11.1   6.54  )-----------( 50       ( 21 Jul 2023 05:50 )             32.1     Mean Cell Volume: 102.2 fL (07-21-23 @ 05:50)    07-21    135  |  98  |  6<L>  ----------------------------<  109<H>  3.5   |  22  |  0.59    Ca    8.0<L>      21 Jul 2023 05:50  Phos  2.5     07-21  Mg     1.30     07-21    TPro  6.5  /  Alb  2.8<L>  /  TBili  5.5<H>  /  DBili  x   /  AST  55<H>  /  ALT  13  /  AlkPhos  236<H>  07-21    LIVER FUNCTIONS - ( 21 Jul 2023 05:50 )  Alb: 2.8 g/dL / Pro: 6.5 g/dL / ALK PHOS: 236 U/L / ALT: 13 U/L / AST: 55 U/L / GGT: x           PT/INR - ( 21 Jul 2023 05:50 )   PT: 22.3 sec;   INR: 1.91 ratio         PTT - ( 21 Jul 2023 05:50 )  PTT:39.3 sec  Urinalysis Basic - ( 21 Jul 2023 05:50 )    Color: x / Appearance: x / SG: x / pH: x  Gluc: 109 mg/dL / Ketone: x  / Bili: x / Urobili: x   Blood: x / Protein: x / Nitrite: x   Leuk Esterase: x / RBC: x / WBC x   Sq Epi: x / Non Sq Epi: x / Bacteria: x                              11.1   6.54  )-----------( 50       ( 21 Jul 2023 05:50 )             32.1                         10.8   7.05  )-----------( 52       ( 20 Jul 2023 04:25 )             32.8                         12.0   7.48  )-----------( 44       ( 19 Jul 2023 07:44 )             35.9                         12.1   8.86  )-----------( 58       ( 18 Jul 2023 14:15 )             36.3       Imaging: Images reviewed.

## 2023-07-21 NOTE — PROGRESS NOTE ADULT - PROBLEM SELECTOR PLAN 2
Pt with elevated t bili to 9.0, d bili 4.5, alk phos 388, AST 91, and ALT 18. Elevated GGT suggestive of cholestatic disease. Concerning for cholestasis/biliary obstruction with component of hepatocellular injury/hepatitis 2/2 alcohol use.  -appreciate GI/Hepatology recs  -trend LFTs  -s/p loading dose NAC 150mg/kg/day, now on maintenance dose 100mg/kg/day   -c/w ursodiol 500mg BID  -f/u bcx, HIV, Hep A, Joseph  -f/u CT angio abd w/ oral and IV contrast  -f/u MRCP for elevated TBili Pt with elevated t bili to 9.0, d bili 4.5, alk phos 388, AST 91, and ALT 18. Elevated GGT suggestive of cholestatic disease. Concerning for cholestasis/biliary obstruction with component of hepatocellular injury/hepatitis 2/2 alcohol use. Ceruloplasmin 2.4.  -[facility] GI/Hepatology recs  -trend LFTs  -s/p loading dose NAC 150mg/kg/day, now on maintenance dose 100mg/kg/day   -c/w ursodiol 500mg BID  -f/u ASMA, MARTA, IGG  -f/u bcx, HIV, Hep A  -f/u CT angio abd w/ oral and IV contrast  -f/u MRCP for elevated TBili Pt with elevated t bili to 9.0, d bili 4.5, alk phos 388, AST 91, and ALT 18. Elevated GGT suggestive of cholestatic disease. Concerning for cholestasis/biliary obstruction with component of hepatocellular injury/hepatitis 2/2 alcohol use. Ceruloplasmin 2.4.  -[facility] GI/Hepatology recs  -trend LFTs  -s/p loading dose NAC 150mg/kg/day and maintenance dose 100mg/kg/day   -c/w ursodiol 500mg BID  -f/u ASMA, MARTA, IGG  -f/u bcx, HIV, Hep A  -f/u CT angio abd w/ oral and IV contrast Pt with elevated t bili to 9.0, d bili 4.5, alk phos 388, AST 91, and ALT 18. Elevated GGT suggestive of cholestatic disease. Concerning for cholestasis/biliary obstruction with component of hepatocellular injury/hepatitis 2/2 alcohol use. Ceruloplasmin 2.4.   -[facility] GI/Hepatology recs  -trend LFTs  -s/p loading dose NAC 150mg/kg/day and maintenance dose 100mg/kg/day   -c/w ursodiol 500mg BID  -f/u ASMA, MARTA, IGG  -f/u bcx, HIV, Hep A  -f/u CT angio abd w/ oral and IV contrast

## 2023-07-21 NOTE — PROGRESS NOTE ADULT - PROBLEM SELECTOR PLAN 11
as of 7/20, mag 1.5, phos 2.4, potassium 3.7. Likely in setting of chronic alcohol use.   -Goal Mag 2.0, Phos 3.0, Potassium 4.0  -will replete as needed as of 7/21, mag 1.3, phos 2.5, potassium 3.5. Likely in setting of chronic alcohol use.   -Goal Mag 2.0, Phos 3.0, Potassium 4.0  -will replete as needed

## 2023-07-21 NOTE — PROGRESS NOTE ADULT - ATTENDING COMMENTS
57M with PMH of AUD, ?prior DVT no longer on AC who is presenting with increasing abdominal distention and b/l LE edema secondary to acute decompensated alcoholic cirrhosis.     # AUD  # Alcoholic Hepatitis   # Acute decompensated alcoholic cirrhosis   - no h/o withdrawals, CIWAs have been negative, SBIRT completed, will d/c CIWA monitoring today  - s/p paracentesis 7/20 with 16L removed; fluid analysis not consistent with SBP. f/u culture   - Hepatology following - c/w NAC protocol, ursodiol; no plans for steroids at this time   - Screening EGD in patient vs outpatient     # + C diff - given that patient is having formed stool, suspect this is likely secondary to colonization, will hold off Rx at this time   # Anemia, Thrombocytopenia, Coagulopathy  - in setting of cirrhosis, continue to monitor.

## 2023-07-21 NOTE — PROGRESS NOTE ADULT - PROBLEM SELECTOR PLAN 3
Hgb stable at 12.1>12.0 with .5, consistent with macrocytic anemia, likely 2/2 alcohol use with b12/folate deficiencies. Hemolytic anemia considered () however less likely given Retic 3.0%, haptoglobin 105.  -f/u B12, folate  -c/w folic acid supplement daily Hgb stable at 12.0>12.1>10.8>11.1 with .5, consistent with macrocytic anemia, likely 2/2 alcohol use with b12/folate deficiencies. Hemolytic anemia considered () however less likely given Retic 3.0%, haptoglobin 105.  -f/u B12, folate  -c/w folic acid supplement daily Hgb stable at 12.0>12.1>10.8>11.1 with .5, consistent with macrocytic anemia, likely 2/2 alcohol use. B12/folate WNL. Hemolytic anemia considered () however less likely given Retic 3.0%, haptoglobin 105.  -c/w folic acid supplement daily

## 2023-07-21 NOTE — PROGRESS NOTE ADULT - PROBLEM SELECTOR PLAN 6
Likely from abdominal distention from ascites. No respiratory sx as of 7/19. SpO2 consistently % on RA. also reports worsening leg swelling over the past 2 weeks. reports hx of DVT approximately 30 years ago. On exam has 2+ pitting edema to below the knee. Calves nontender however left calf is mildly firm, possibly due to skin changes. US duplex BLE showed L beltran's cyst but no DVT. TTE with mild diastolic dysfunction, EF 61%.   -vital signs q4  -pulse ox  -monitor following tap

## 2023-07-21 NOTE — PROGRESS NOTE ADULT - PROBLEM SELECTOR PLAN 5
Pt w/ recent diarrhea, no N/V, likely in setting alcohol use and liver dysfunction. no recent abx use. Patient not having diarrhea on admission. C. diff positive however low suspicion for active infection at this time. GI pcr negative    -f/u stool culture NGTD  -f/u CT angio abd w/ oral and IV contrast  -monitor stool count  -on isolation precautions, can d/c 7/21 if no diarrhea Pt w/ recent diarrhea, no N/V, likely in setting alcohol use and liver dysfunction. no recent abx use. Patient not having diarrhea on admission. C. diff positive however low suspicion for active infection at this time. GI pcr negative  -f/u stool culture NGTD  -f/u CT angio abd w/ oral and IV contrast  -monitor stool count  -on isolation precautions, can d/c 7/21 if no diarrhea

## 2023-07-21 NOTE — PROGRESS NOTE ADULT - SUBJECTIVE AND OBJECTIVE BOX
Emerson Ocasio MD  PGY 1 Department of Internal Medicine        Patient is a 57y old  Male who presents with a chief complaint of Abdominal distention (20 Jul 2023 18:21)      SUBJECTIVE / OVERNIGHT EVENTS: Pt seen and examined. No acute overnight events. Denies fevers, chills, CP, SOB, Abdominal pain, N/V, Constipation, Diarrhea        MEDICATIONS  (STANDING):  cholestyramine Powder (Sugar-Free) 4 Gram(s) Oral daily  folic acid 1 milliGRAM(s) Oral daily  furosemide    Tablet 40 milliGRAM(s) Oral daily  heparin   Injectable 5000 Unit(s) SubCutaneous every 8 hours  magnesium sulfate  IVPB 2 Gram(s) IV Intermittent once  multivitamin 1 Tablet(s) Oral daily  polyethylene glycol 3350 17 Gram(s) Oral daily  spironolactone 100 milliGRAM(s) Oral daily  thiamine 100 milliGRAM(s) Oral daily  ursodiol Tablet 500 milliGRAM(s) Oral every 12 hours    MEDICATIONS  (PRN):  acetaminophen     Tablet .. 650 milliGRAM(s) Oral every 6 hours PRN Temp greater or equal to 38C (100.4F), Mild Pain (1 - 3)  calamine/zinc oxide Lotion 1 Application(s) Topical daily PRN Rash and/or Itching  LORazepam     Tablet 2 milliGRAM(s) Oral every 2 hours PRN CIWA-Ar score increase by 2 points and a total score of 7 or less  LORazepam   Injectable 2 milliGRAM(s) IV Push every 1 hour PRN CIWA-Ar score 8 or greater      I&O's Summary      Vital Signs Last 24 Hrs  T(C): 36.9 (21 Jul 2023 06:05), Max: 37 (20 Jul 2023 14:00)  T(F): 98.4 (21 Jul 2023 06:05), Max: 98.6 (20 Jul 2023 14:00)  HR: 97 (21 Jul 2023 06:05) (90 - 97)  BP: 129/76 (21 Jul 2023 06:05) (129/76 - 148/74)  BP(mean): --  RR: 18 (21 Jul 2023 06:05) (18 - 18)  SpO2: 96% (21 Jul 2023 06:05) (96% - 99%)    Parameters below as of 21 Jul 2023 06:05  Patient On (Oxygen Delivery Method): room air        CAPILLARY BLOOD GLUCOSE          PHYSICAL EXAM:  GENERAL: NAD,   HEAD:  Atraumatic, Normocephalic  EYES: EOMI, PERRL, conjunctiva and sclera clear  NECK: No JVD  CHEST/LUNG: Clear to auscultation bilaterally; No wheeze  HEART: Regular rate and rhythm; No murmurs, rubs, or gallops  ABDOMEN: Soft, Nontender, Nondistended; Bowel sounds present  EXTREMITIES:  2+ Peripheral Pulses, No clubbing, cyanosis, or edema  PSYCH: AAOx3  NEUROLOGY: non-focal  SKIN: No rashes or lesions       LABS:                        11.1   6.54  )-----------( 50       ( 21 Jul 2023 05:50 )             32.1     Auto Eosinophil # x     / Auto Eosinophil % x     / Auto Neutrophil # x     / Auto Neutrophil % x     / BANDS % x                            10.8   7.05  )-----------( 52       ( 20 Jul 2023 04:25 )             32.8     Auto Eosinophil # x     / Auto Eosinophil % x     / Auto Neutrophil # x     / Auto Neutrophil % x     / BANDS % x        07-21    135  |  98  |  6<L>  ----------------------------<  109<H>  3.5   |  22  |  0.59  07-20    136  |  99  |  9   ----------------------------<  103<H>  3.7   |  26  |  0.70    Ca    8.0<L>      21 Jul 2023 05:50  Mg     1.30     07-21  Phos  2.5     07-21  TPro  6.5  /  Alb  2.8<L>  /  TBili  5.5<H>  /  DBili  x   /  AST  55<H>  /  ALT  13  /  AlkPhos  236<H>  07-21  TPro  7.2  /  Alb  2.9<L>  /  TBili  6.1<H>  /  DBili  x   /  AST  80<H>  /  ALT  17  /  AlkPhos  327<H>  07-20    PT/INR - ( 21 Jul 2023 05:50 )   PT: 22.3 sec;   INR: 1.91 ratio         PTT - ( 21 Jul 2023 05:50 )  PTT:39.3 sec      Urinalysis Basic - ( 21 Jul 2023 05:50 )    Color: x / Appearance: x / SG: x / pH: x  Gluc: 109 mg/dL / Ketone: x  / Bili: x / Urobili: x   Blood: x / Protein: x / Nitrite: x   Leuk Esterase: x / RBC: x / WBC x   Sq Epi: x / Non Sq Epi: x / Bacteria: x      Lactate, Blood: 1.4 mmol/L (07-20 @ 04:25)        RADIOLOGY & ADDITIONAL TESTS:    Imaging Personally Reviewed:    Consultant(s) Notes Reviewed:      Care Discussed with Consultants/Other Providers:   Emerson Ocasio MD  PGY 1 Department of Internal Medicine        Patient is a 57y old  Male who presents with a chief complaint of Abdominal distention (21 Jul 2023 07:45)      SUBJECTIVE / OVERNIGHT EVENTS: Pt seen and examined. No acute overnight events. Tolerating regular diet. No complaints at this time. Denies fevers, chills, CP, SOB, Abdominal pain, N/V, Constipation, Diarrhea        MEDICATIONS  (STANDING):  cholestyramine Powder (Sugar-Free) 4 Gram(s) Oral daily  folic acid 1 milliGRAM(s) Oral daily  furosemide    Tablet 40 milliGRAM(s) Oral daily  heparin   Injectable 5000 Unit(s) SubCutaneous every 8 hours  magnesium sulfate  IVPB 2 Gram(s) IV Intermittent once  multivitamin 1 Tablet(s) Oral daily  polyethylene glycol 3350 17 Gram(s) Oral daily  spironolactone 100 milliGRAM(s) Oral daily  thiamine 100 milliGRAM(s) Oral daily  ursodiol Tablet 500 milliGRAM(s) Oral every 12 hours    MEDICATIONS  (PRN):  acetaminophen     Tablet .. 650 milliGRAM(s) Oral every 6 hours PRN Temp greater or equal to 38C (100.4F), Mild Pain (1 - 3)  calamine/zinc oxide Lotion 1 Application(s) Topical daily PRN Rash and/or Itching  LORazepam     Tablet 2 milliGRAM(s) Oral every 2 hours PRN CIWA-Ar score increase by 2 points and a total score of 7 or less  LORazepam   Injectable 2 milliGRAM(s) IV Push every 1 hour PRN CIWA-Ar score 8 or greater      I&O's Summary      Vital Signs Last 24 Hrs  T(C): 36.9 (21 Jul 2023 06:05), Max: 37 (20 Jul 2023 14:00)  T(F): 98.4 (21 Jul 2023 06:05), Max: 98.6 (20 Jul 2023 14:00)  HR: 97 (21 Jul 2023 06:05) (90 - 97)  BP: 129/76 (21 Jul 2023 06:05) (129/76 - 148/74)  BP(mean): --  RR: 18 (21 Jul 2023 06:05) (18 - 18)  SpO2: 96% (21 Jul 2023 06:05) (96% - 99%)    Parameters below as of 21 Jul 2023 06:05  Patient On (Oxygen Delivery Method): room air        CAPILLARY BLOOD GLUCOSE          PHYSICAL EXAM:  GENERAL: NAD,  resting comfortably  HEAD:  Atraumatic, Normocephalic  EYES: EOMI, PERRL, conjunctiva nl, scleral icterus bilaterally  NECK: No JVD  CHEST/LUNG: Clear to auscultation bilaterally; No wheeze  HEART: Regular rate and rhythm; No murmurs, rubs, or gallops  ABDOMEN: Soft, Nontender, Protuberant abdomen, significantly less distended. Reducible umbilical hernia; Bowel sounds present. Dressing to right abdomen C/D/I  EXTREMITIES:  2+ Peripheral Pulses, 2+ pitting edema to lower extremities, No clubbing, cyanosis. Calves are non tender and without erythema or warmth. Left calf mildly firm  PSYCH: AAOx3  NEUROLOGY: non-focal, moving all extremities, cranial nerves intact  SKIN: Multiple areas of excoriations to extremities with scabbing       LABS:                        11.1   6.54  )-----------( 50       ( 21 Jul 2023 05:50 )             32.1     Auto Eosinophil # x     / Auto Eosinophil % x     / Auto Neutrophil # x     / Auto Neutrophil % x     / BANDS % x                            10.8   7.05  )-----------( 52       ( 20 Jul 2023 04:25 )             32.8     Auto Eosinophil # x     / Auto Eosinophil % x     / Auto Neutrophil # x     / Auto Neutrophil % x     / BANDS % x        07-21    135  |  98  |  6<L>  ----------------------------<  109<H>  3.5   |  22  |  0.59  07-20    136  |  99  |  9   ----------------------------<  103<H>  3.7   |  26  |  0.70    Ca    8.0<L>      21 Jul 2023 05:50  Mg     1.30     07-21  Phos  2.5     07-21  TPro  6.5  /  Alb  2.8<L>  /  TBili  5.5<H>  /  DBili  x   /  AST  55<H>  /  ALT  13  /  AlkPhos  236<H>  07-21  TPro  7.2  /  Alb  2.9<L>  /  TBili  6.1<H>  /  DBili  x   /  AST  80<H>  /  ALT  17  /  AlkPhos  327<H>  07-20    PT/INR - ( 21 Jul 2023 05:50 )   PT: 22.3 sec;   INR: 1.91 ratio         PTT - ( 21 Jul 2023 05:50 )  PTT:39.3 sec      Urinalysis Basic - ( 21 Jul 2023 05:50 )    Color: x / Appearance: x / SG: x / pH: x  Gluc: 109 mg/dL / Ketone: x  / Bili: x / Urobili: x   Blood: x / Protein: x / Nitrite: x   Leuk Esterase: x / RBC: x / WBC x   Sq Epi: x / Non Sq Epi: x / Bacteria: x      Lactate, Blood: 1.4 mmol/L (07-20 @ 04:25)        RADIOLOGY & ADDITIONAL TESTS:    Imaging Personally Reviewed:    Consultant(s) Notes Reviewed:      Care Discussed with Consultants/Other Providers:

## 2023-07-21 NOTE — PROGRESS NOTE ADULT - PROBLEM SELECTOR PLAN 7
Plt count 58>44>52. Also with elevated PT/PTT, elevated D-dimer, decreased fibrinogen. Suspect coagulopathies from bone marrow suppression in setting of chronic alcohol use and possibly from liver failure. Low suspicion for TTP given lack of fevers, renal failure, and AMS. DIC also considered but unlikely given normal haptoglobin  -monitor plt  -transfuse <10, <20k and febrile, and <50k w/ active bleeding Plt count 58>44>52>50. Also with elevated PT/PTT, elevated D-dimer, decreased fibrinogen. Suspect coagulopathies from bone marrow suppression in setting of chronic alcohol use and possibly from liver failure. Low suspicion for TTP given lack of fevers, renal failure, and AMS. DIC also considered but unlikely given normal haptoglobin  -monitor plt  -transfuse <10, <20k and febrile, and <50k w/ active bleeding

## 2023-07-21 NOTE — PROGRESS NOTE ADULT - ASSESSMENT
56yo w/ PMHx AUD, remote hx DVT (not on AC, ?if provoked) presenting w/ jaundice and ascites in setting heavy alcohol use c/f alcoholic hepatitis +/- cirrhosis    1. C/f alcohol hepatitis  Suspect diagnosis of alcoholic hepatitis given significant ongoing alcohol use, juandice, AST >50, AST/ALT>1.5, total bili >3. Maddrey 27.4--> 49 today. Less likely ischemic hepatitis, DILI (no new drugs outside of zquil over the course of a week). UA unremarkable and though C. diff positive, no diarrhea per patient, ascitic cxs neg.   -C. diff pcr positive though asypmatomatic- unlikely true infection (?colonization)  -F/u HIV  -Will consider initiation of steroids today given uptrending MDF and neg infectious w/u  -Ursodiol 500mg BID  -trend CBC, CMP, INR  -Alcohol cessation - pt feels he can quit easily and not currently interested in pharmacological assistance but may benefit from discussion on rehab/group programs  -CIWA scoring and symptom triggered management of withdrawal      2. Ascites, likely 2/2 decompensated alcoholic cirrhosis.   MELD 20 (7/20)-->23 (7/21), B+  Most likely 2/2 cirrhosis (in setting AUD) based on hx and lab findings (low plts, albumin, elevated INR), though should be confirmed by imaging (pending CT A/P) and other etiologies of cirrhosis evaluated. Fluid studies c/w portal HTN. No e/o PVT on U/S. TTE WNL. Clinically is volume up w/ asterixis. Will need screening EGD. Adequate diuresis based on Sheila  -Send Hep A Ab levels  -F/u CT A/P w/ IVC   -C/w 40mg, aldactone 100mg for diuresis  -Careful clinical monitoring of HE- has asterixis on exam today so aim for 3 BMs/day  -Will need screening EGD- likely while inpatient though will defer until infectious w/u complete  -Will begin eval for OLT   Note incomplete until finalized by attending signature/attestation.    Monica Hale  GI/Hepatology Fellow PGY5    NON-URGENT CONSULTS:  Please email [email or web address]  AT NIGHT AND ON WEEKENDS:  Available on Microsoft Teams  [phone] (Long Range Pager)    After 5pm, please contact the on-call GI fellow. [phone] 58yo w/ PMHx AUD, remote hx DVT (not on AC, ?if provoked) presenting w/ jaundice and ascites in setting heavy alcohol use c/f alcoholic hepatitis +/- cirrhosis    1. C/f alcohol hepatitis  Suspect diagnosis of alcoholic hepatitis given significant ongoing alcohol use, juandice, AST >50, AST/ALT>1.5, total bili >3. Maddrey 27.4--> 49 today. Less likely ischemic hepatitis, DILI (no new drugs outside of zquil over the course of a week). UA unremarkable and though C. diff positive, no diarrhea per patient, ascitic cxs neg.   -C. diff pcr positive though asymptomatic- unlikely true infection (?colonization)  -F/u HIV  -Can start prednisolone 40mg daily given MDF>32 and no signs of active infection  -Ursodiol 500mg BID  -trend CBC, CMP, INR  -Alcohol cessation - pt feels he can quit easily and not currently interested in pharmacological assistance but may benefit from discussion on rehab/group programs  -CIWA scoring and symptom triggered management of withdrawal      2. Ascites, likely 2/2 decompensated alcoholic cirrhosis.   MELD 20 (7/20)-->23 (7/21), B+  Most likely 2/2 cirrhosis (in setting AUD) based on hx and lab findings (low plts, albumin, elevated INR), though should be confirmed by imaging (pending CT A/P) and other etiologies of cirrhosis evaluated. Fluid studies c/w portal HTN. No e/o PVT on U/S. TTE WNL. Clinically is volume up w/ asterixis. Will need screening EGD. Adequate diuresis based on Sheila  -Send Hep A Ab levels  -F/u CT A/P w/ IVC   -C/w 40mg, aldactone 100mg for diuresis  -Careful clinical monitoring of HE- has asterixis on exam today so aim for 3 BMs/day  -Will need screening EGD- likely while inpatient though will defer until infectious w/u complete  -Will begin eval for OLT   Note incomplete until finalized by attending signature/attestation.    Monica Hale  GI/Hepatology Fellow PGY5    NON-URGENT CONSULTS:  Please email [email or web address]  AT NIGHT AND ON WEEKENDS:  Available on Microsoft Teams  [phone] (Long Range Pager)    After 5pm, please contact the on-call GI fellow. [phone] 56yo w/ PMHx AUD, remote hx DVT (not on AC, ?if provoked) presenting w/ jaundice and ascites in setting heavy alcohol use c/f alcoholic hepatitis +/- cirrhosis    1. C/f alcohol hepatitis  Suspect diagnosis of alcoholic hepatitis given significant ongoing alcohol use, juandice, AST >50, AST/ALT>1.5, total bili >3. Maddrey 27.4--> 49 today. Less likely ischemic hepatitis, DILI (no new drugs outside of zquil over the course of a week). UA unremarkable and though C. diff positive, no diarrhea per patient, ascitic cxs neg.   -C. diff pcr positive though asymptomatic- unlikely true infection (?colonization)  -F/u HIV  -Can start prednisolone 40mg daily given MDF>32 and no signs of active infection  -Ursodiol 500mg BID  -trend CBC, CMP, INR  -Alcohol cessation - pt feels he can quit easily and not currently interested in pharmacological assistance but may benefit from discussion on rehab/group programs  -CIWA scoring and symptom triggered management of withdrawal      2. Ascites, likely 2/2 decompensated alcoholic cirrhosis.   MELD 20 (7/20)-->23 (7/21), B+  Most likely 2/2 cirrhosis (in setting AUD) based on hx and lab findings (low plts, albumin, elevated INR), though should be confirmed by imaging (pending CT A/P) and other etiologies of cirrhosis evaluated. Fluid studies c/w portal HTN. No e/o PVT on U/S. TTE WNL. Clinically is volume up w/o asterixis. Will need screening EGD. Adequate diuresis based on Sheila  -Send Hep A Ab levels  -F/u CT A/P w/ IVC   -C/w 40mg, aldactone 100mg for diuresis  -Clinical monitoring for HE  -Will need screening EGD- likely while inpatient though will defer until infectious w/u complete  -Will begin eval for OLT   Note incomplete until finalized by attending signature/attestation.    Monica Hale  GI/Hepatology Fellow PGY5    NON-URGENT CONSULTS:  Please email [email or web address]  AT NIGHT AND ON WEEKENDS:  Available on [employer] Teams  [phone] (Long Range Pager)    After 5pm, please contact the on-call GI fellow. [phone]

## 2023-07-21 NOTE — PROGRESS NOTE ADULT - PROBLEM SELECTOR PLAN 4
Pt drinks 3-5 "double glass" of [place] whiskey after work for the past 25 years. Denies hx of withdrawal syndrome. Last drink a week ago, but pt with elevated blood alcohol level. On exam, no tongue fasciculations or tremors.  -s/p librium x1  -start symptom-triggered CIWA with Ativan for now  -thiamine 100 mg 7/18-7/22  -folic acid 1 mcg daily  -aspiration and seizure precautions  -fall risk protocol  -SW consult. Pt drinks 3-5 "double glass" of [place] whiskey after work for the past 25 years. Denies hx of withdrawal syndrome. Last drink a week ago, but pt with elevated blood alcohol level. On exam, no tongue fasciculations or tremors.  -s/p librium x1  -CIWA 0 during admission, no withdrawal sx - will d/c CIWA protocol   -thiamine 100 mg 7/18-7/22  -folic acid 1 mcg daily  -aspiration and seizure precautions  -fall risk protocol  -declined social work, pt states he can stop drinking without interventions Pt drinks 3-5 "double glass" of [place] whiskey after work for the past 25 years. Denies hx of withdrawal syndrome. Last drink a week ago, but pt with elevated blood alcohol level. On exam, no tongue fasciculations or tremors.  -s/p librium x1  -CIWA 0 throughout admit, no withdrawal sign/sx -  CIWA protocol d/c 7/21  -thiamine 100 mg 7/18-7/22  -folic acid 1 mcg daily  -aspiration and seizure precautions  -fall risk protocol  -SW offered but declined

## 2023-07-21 NOTE — PROGRESS NOTE ADULT - PROBLEM SELECTOR PLAN 1
Pt with 1.5 weeks of increasing abdominal distention and b/l LE edema. On exam tense ascites appreciated. Concerning for ascites in setting of possible liver failure from alcohol use vs. Budd Chiari syndrome, less likely malignant ascites or from acute decompensated heart failure. Ascites confirmed on ultrasound. Abdominal US demonstrating cirrhosis, likely alcoholic. s/p paracentesis with 16L removed. Patient feeling much better after having fluid removed. Peritoneal fluid showing 157 nucleated cell count r/o SBP.   -GI/hepatology consulted appreciated recs  -c/w IV albumin 25%   -f/u therapeutic tap w/ studies (saag, cell count diff, cx, total protein)  -f/u CT angio abd w/ oral and IV contrast  -serial abdominal exams  -c/w Lasix 40mg PO, spironolactone 100mg PO  -c/w Miralax daily Pt with 1.5 weeks of increasing abdominal distention and b/l LE edema. On exam tense ascites appreciated. Concerning for ascites in setting of possible liver failure from alcohol use vs. Budd Chiari syndrome, less likely malignant ascites or from acute decompensated heart failure. Ascites confirmed on ultrasound. Abdominal US demonstrating cirrhosis, likely alcoholic. s/p paracentesis with 16L removed. Patient feeling much better after having fluid removed. Peritoneal fluid showing 157 nucleated cell count r/o SBP. SAAG 1.8, in setting of TTE ascites 2/2 cirrhosis.  -GI/hepatology consulted appreciated recs  -c/w IV albumin 25%   -f/u therapeutic tap w/ studies (saag, cell count diff, cx, total protein)  -f/u CT angio abd w/ oral and IV contrast  -serial abdominal exams  -c/w Lasix 40mg PO, spironolactone 100mg PO  -c/w Miralax daily Pt with 1.5 weeks of increasing abdominal distention and b/l LE edema. On exam tense ascites appreciated. Concerning for ascites in setting of possible liver failure from alcohol use vs. Budd Chiari syndrome, less likely malignant ascites or from acute decompensated heart failure. Ascites confirmed on ultrasound. Abdominal US demonstrating cirrhosis, likely alcoholic. s/p paracentesis with 16L removed. Patient feeling much better after having fluid removed. Peritoneal fluid showing 157 nucleated cell count r/o SBP. SAAG 1.8, in setting of TTE ascites 2/2 cirrhosis.  -GI/hepatology consulted appreciated recs  -s/p IV albumin 25%   -f/u therapeutic tap w/ studies (saag, cell count diff, cx, total protein)  -f/u CT angio abd w/ oral and IV contrast  -serial abdominal exams  -c/w Lasix 40mg PO, spironolactone 100mg PO  -c/w Miralax daily Pt with 1.5 weeks of increasing abdominal distention and b/l LE edema. On exam tense ascites appreciated. Concerning for ascites in setting of possible liver failure from alcohol use vs. Budd Chiari syndrome, less likely malignant ascites or from acute decompensated heart failure. Ascites confirmed on ultrasound. Abdominal US demonstrating cirrhosis, likely alcoholic. s/p paracentesis with 16L removed. Patient feeling much better after having fluid removed. Peritoneal fluid showing 157 nucleated cell count r/o SBP. SAAG 1.8 in setting of unremarkable TTE, ascites 2/2 cirrhosis.   -GI/hepatology consulted appreciated recs  -s/p IV albumin 25%   -f/u therapeutic tap w/ studies (saag, cell count diff, cx, total protein)  -f/u CT angio abd w/ oral and IV contrast  -serial abdominal exams  -c/w Lasix 40mg PO, spironolactone 100mg PO  -c/w Miralax daily  -EGD/transplant screen as outpatient Pt with AUD p/w 1.5 weeks of increasing abdominal distention and b/l LE edema. Concerning for ascites in setting of possible liver failure from alcohol use vs. Budd Chiari syndrome, less likely malignant ascites or from acute decompensated heart failure. Ascites and cirrhosis confirmed on US. s/p paracentesis with 16L removed. Patient feeling much better after having fluid removed. Peritoneal fluid showing 157 nucleated cell count r/o SBP. Fluid Tprotein 2.4, LDH 83, glucose 115, alb 1.0. Fluid gram stain w/ PML, no organisms. SAAG 1.8 - in setting of unremarkable TTE, ascites 2/2 alcoholic cirrhosis.   -GI/hepatology consulted appreciated recs  -s/p IV albumin 25%   -f/u CT angio abd w/ oral and IV contrast  -serial abdominal exams  -c/w Lasix 40mg PO, spironolactone 100mg PO  -c/w Miralax daily  -EGD/transplant screen as outpatient Pt with AUD p/w 1.5 weeks of increasing abdominal distention and b/l LE edema. Concerning for ascites in setting of possible liver failure from alcohol use vs. Budd Chiari syndrome, less likely malignant ascites or from acute decompensated heart failure. Ascites and cirrhosis confirmed on US. s/p paracentesis with 16L removed. Patient feeling much better after having fluid removed. Peritoneal fluid showing 157 nucleated cell count r/o SBP. Fluid Tprotein 2.4, LDH 83, glucose 115, alb 1.0. Fluid gram stain w/ PML, no organisms. SAAG 1.8 - in setting of unremarkable TTE, ascites 2/2 portal hypertension, likely alcoholic cirrhosis.   -GI/hepatology consulted appreciated recs  -s/p IV albumin 25%   -f/u CT angio abd w/ oral and IV contrast  -serial abdominal exams  -c/w Lasix 40mg PO, spironolactone 100mg PO  -c/w Miralax daily  -EGD/transplant screen as outpatient Pt with AUD p/w 1.5 weeks of increasing abdominal distention and b/l LE edema. Concerning for ascites in setting of possible liver failure from alcohol use vs. Budd Chiari syndrome, less likely malignant ascites or from acute decompensated heart failure. Ascites and cirrhosis confirmed on US. s/p paracentesis with 16L removed. Patient feeling much better after having fluid removed. Peritoneal fluid showing 157 nucleated cell count r/o SBP. Fluid Tprotein 2.4, LDH 83, glucose 115, alb 1.0. Fluid gram stain w/ PML, no organisms. SAAG 1.8 - in setting of unremarkable TTE, ascites 2/2 portal hypertension, likely alcoholic cirrhosis.   -GI/hepatology consulted appreciated recs  -s/p IV albumin 25%   -f/u CT angio abd w/ oral and IV contrast  -serial abdominal exams  -c/w Lasix 40mg PO, spironolactone 100mg PO  -c/w Miralax daily  -Will start Lactulose 20g BID for HE prevention, goal >3 BM per day, uptitrate as needed  -EGD/transplant screen as outpatient Pt with AUD p/w 1.5 weeks of increasing abdominal distention and b/l LE edema. Concerning for ascites in setting of possible liver failure from alcohol use vs. Budd Chiari syndrome, less likely malignant ascites or from acute decompensated heart failure. Ascites and cirrhosis confirmed on US. s/p paracentesis with 16L removed. Patient feeling much better after having fluid removed. Peritoneal fluid showing 157 nucleated cell count r/o SBP. Fluid Tprotein 2.4, LDH 83, glucose 115, alb 1.0. Fluid gram stain w/ PML, no organisms. SAAG 1.8 - in setting of unremarkable TTE, ascites 2/2 portal hypertension, likely alcoholic cirrhosis.   -GI/hepatology consulted appreciated recs  -s/p IV albumin 25%   -f/u CT angio abd w/ oral and IV contrast  -serial abdominal exams  -c/w Lasix 40mg PO, spironolactone 100mg PO  -c/w Miralax daily  -Will start Lactulose 20g BID for HE prevention, goal 3 BM per day, uptitrate as needed  -EGD/transplant screen as outpatient

## 2023-07-21 NOTE — PROGRESS NOTE ADULT - PROBLEM SELECTOR PLAN 8
on exam has multiple areas of excoriation and scabbing to all extremities. Pt reports he has intermittent diffuse skin pruritis, not experiencing at this time. Has tried multiple OTC creams to no effect. Pruritis likely 2/2 elevated bilirubin and liver dysfunction.   -c/w cholestyramine 4g daily  -start calamine lotion  -monitor on exam has multiple areas of excoriation and scabbing to all extremities. Pt reports he has intermittent diffuse skin pruritis, not experiencing at this time. Has tried multiple OTC creams to no effect. Pruritis likely 2/2 elevated bilirubin and liver dysfunction.   -c/w cholestyramine 4g daily  -c/w calamine lotion  -monitor

## 2023-07-22 DIAGNOSIS — K74.60 UNSPECIFIED CIRRHOSIS OF LIVER: ICD-10-CM

## 2023-07-22 LAB
ALBUMIN SERPL ELPH-MCNC: 3.5 G/DL — SIGNIFICANT CHANGE UP (ref 3.3–5)
ALP SERPL-CCNC: 269 U/L — HIGH (ref 40–120)
ALT FLD-CCNC: 16 U/L — SIGNIFICANT CHANGE UP (ref 4–41)
ANION GAP SERPL CALC-SCNC: 12 MMOL/L — SIGNIFICANT CHANGE UP (ref 7–14)
APTT BLD: 37.1 SEC — HIGH (ref 27–36.3)
AST SERPL-CCNC: 53 U/L — HIGH (ref 4–40)
BILIRUB SERPL-MCNC: 6.2 MG/DL — HIGH (ref 0.2–1.2)
BUN SERPL-MCNC: 8 MG/DL — SIGNIFICANT CHANGE UP (ref 7–23)
CALCIUM SERPL-MCNC: 8.7 MG/DL — SIGNIFICANT CHANGE UP (ref 8.4–10.5)
CHLORIDE SERPL-SCNC: 96 MMOL/L — LOW (ref 98–107)
CO2 SERPL-SCNC: 25 MMOL/L — SIGNIFICANT CHANGE UP (ref 22–31)
CREAT SERPL-MCNC: 0.62 MG/DL — SIGNIFICANT CHANGE UP (ref 0.5–1.3)
EGFR: 112 ML/MIN/1.73M2 — SIGNIFICANT CHANGE UP
GLUCOSE SERPL-MCNC: 212 MG/DL — HIGH (ref 70–99)
HCT VFR BLD CALC: 35.8 % — LOW (ref 39–50)
HGB BLD-MCNC: 12.1 G/DL — LOW (ref 13–17)
INR BLD: 1.76 RATIO — HIGH (ref 0.88–1.16)
MAGNESIUM SERPL-MCNC: 1.5 MG/DL — LOW (ref 1.6–2.6)
MCHC RBC-ENTMCNC: 33.8 GM/DL — SIGNIFICANT CHANGE UP (ref 32–36)
MCHC RBC-ENTMCNC: 35.1 PG — HIGH (ref 27–34)
MCV RBC AUTO: 103.8 FL — HIGH (ref 80–100)
NRBC # BLD: 0 /100 WBCS — SIGNIFICANT CHANGE UP (ref 0–0)
NRBC # FLD: 0 K/UL — SIGNIFICANT CHANGE UP (ref 0–0)
PHOSPHATE SERPL-MCNC: 2.7 MG/DL — SIGNIFICANT CHANGE UP (ref 2.5–4.5)
PLATELET # BLD AUTO: 55 K/UL — LOW (ref 150–400)
POTASSIUM SERPL-MCNC: 4.1 MMOL/L — SIGNIFICANT CHANGE UP (ref 3.5–5.3)
POTASSIUM SERPL-SCNC: 4.1 MMOL/L — SIGNIFICANT CHANGE UP (ref 3.5–5.3)
PROT SERPL-MCNC: 7.6 G/DL — SIGNIFICANT CHANGE UP (ref 6–8.3)
PROTHROM AB SERPL-ACNC: 20.5 SEC — HIGH (ref 10.5–13.4)
RBC # BLD: 3.45 M/UL — LOW (ref 4.2–5.8)
RBC # FLD: 14.9 % — HIGH (ref 10.3–14.5)
SODIUM SERPL-SCNC: 133 MMOL/L — LOW (ref 135–145)
WBC # BLD: 7.58 K/UL — SIGNIFICANT CHANGE UP (ref 3.8–10.5)
WBC # FLD AUTO: 7.58 K/UL — SIGNIFICANT CHANGE UP (ref 3.8–10.5)

## 2023-07-22 PROCEDURE — 12345: CPT | Mod: NC,GC

## 2023-07-22 PROCEDURE — 99232 SBSQ HOSP IP/OBS MODERATE 35: CPT | Mod: GC

## 2023-07-22 RX ORDER — MAGNESIUM SULFATE 500 MG/ML
2 VIAL (ML) INJECTION ONCE
Refills: 0 | Status: COMPLETED | OUTPATIENT
Start: 2023-07-22 | End: 2023-07-22

## 2023-07-22 RX ADMIN — HEPARIN SODIUM 5000 UNIT(S): 5000 INJECTION INTRAVENOUS; SUBCUTANEOUS at 22:28

## 2023-07-22 RX ADMIN — Medication 25 GRAM(S): at 14:00

## 2023-07-22 RX ADMIN — Medication 65.63 GRAM(S): at 08:44

## 2023-07-22 RX ADMIN — LACTULOSE 10 GRAM(S): 10 SOLUTION ORAL at 17:04

## 2023-07-22 RX ADMIN — HEPARIN SODIUM 5000 UNIT(S): 5000 INJECTION INTRAVENOUS; SUBCUTANEOUS at 06:19

## 2023-07-22 RX ADMIN — URSODIOL 500 MILLIGRAM(S): 250 TABLET, FILM COATED ORAL at 06:19

## 2023-07-22 RX ADMIN — POLYETHYLENE GLYCOL 3350 17 GRAM(S): 17 POWDER, FOR SOLUTION ORAL at 12:22

## 2023-07-22 RX ADMIN — CHOLESTYRAMINE 4 GRAM(S): 4 POWDER, FOR SUSPENSION ORAL at 08:45

## 2023-07-22 RX ADMIN — Medication 40 MILLIGRAM(S): at 06:19

## 2023-07-22 RX ADMIN — SPIRONOLACTONE 100 MILLIGRAM(S): 25 TABLET, FILM COATED ORAL at 06:18

## 2023-07-22 RX ADMIN — Medication 25 GRAM(S): at 12:20

## 2023-07-22 RX ADMIN — LACTULOSE 10 GRAM(S): 10 SOLUTION ORAL at 06:19

## 2023-07-22 RX ADMIN — URSODIOL 500 MILLIGRAM(S): 250 TABLET, FILM COATED ORAL at 17:24

## 2023-07-22 RX ADMIN — HEPARIN SODIUM 5000 UNIT(S): 5000 INJECTION INTRAVENOUS; SUBCUTANEOUS at 14:03

## 2023-07-22 NOTE — PROGRESS NOTE ADULT - PROBLEM SELECTOR PLAN 5
Pt w/ recent diarrhea, no N/V, likely in setting alcohol use and liver dysfunction. no recent abx use. Patient not having diarrhea on admission. C. diff positive however low suspicion for active infection at this time. GI pcr negative  -f/u stool culture NGTD  -f/u CT angio abd w/ oral and IV contrast  -monitor stool count  -on isolation precautions, can d/c 7/21 if no diarrhea Pt w/ recent diarrhea, no N/V, likely in setting alcohol use and liver dysfunction. no recent abx use. Patient not having diarrhea on admission. C. diff positive however low suspicion for active infection at this time. GI pcr negative  -f/u stool culture NGTD  -monitor stool count  -on isolation precautions, can d/c 7/21 if no diarrhea

## 2023-07-22 NOTE — PROGRESS NOTE ADULT - SUBJECTIVE AND OBJECTIVE BOX
Savi Nicolas, PGY1    ANILA RAMOS  57y  Male    Complaints:  Subjective:     No acute o/n events. Pt denies subj fevers/chills, HA, dizziness, chest pain, palpitations, n/v, abd pain, dysuria, diarrhea      FAMILY HISTORY:  FHx: myocardial infarction (Father)      57yVital Signs Last 24 Hrs  T(C): 36.3 (22 Jul 2023 06:19), Max: 36.9 (21 Jul 2023 10:00)  T(F): 97.4 (22 Jul 2023 06:19), Max: 98.4 (21 Jul 2023 10:00)  HR: 94 (22 Jul 2023 06:19) (87 - 94)  BP: 142/92 (22 Jul 2023 06:19) (122/71 - 142/92)  BP(mean): --  RR: 18 (22 Jul 2023 06:19) (17 - 18)  SpO2: 99% (22 Jul 2023 06:19) (96% - 99%)    Parameters below as of 22 Jul 2023 06:19  Patient On (Oxygen Delivery Method): room air          PHYSICAL EXAM:  GENERAL: NAD, well-groomed, well-developed  HEAD:  Atraumatic, Normocephalic  EYES: EOMI, PERRLA, conjunctiva and sclera clear  ENMT: No tonsillar erythema, exudates, or enlargement; Moist mucous membranes, Good dentition, No lesions  NECK: Supple, No JVD, Normal thyroid  NERVOUS SYSTEM:  Alert & Oriented X3, Good concentration; Motor Strength 5/5 B/L upper and lower extremities; DTRs 2+ intact and symmetric  CHEST/LUNG: Clear to percussion bilaterally; No rales, rhonchi, wheezing, or rubs  HEART: Regular rate and rhythm; No murmurs, rubs, or gallops  ABDOMEN: Soft, Nontender, Nondistended; Bowel sounds present  EXTREMITIES:  2+ Peripheral Pulses, No clubbing, cyanosis, or edema  LYMPH: No lymphadenopathy noted  SKIN: No rashes or lesions      Consultant(s) Notes Reviewed:  [x ] YES  [ ] NO  Care Discussed with Consultants/Other Providers [ x] YES  [ ] NO    LABS:                Male  RADIOLOGY & ADDITIONAL TESTS:    Imaging Personally Reviewed:  [ ] YES  [ ] NO    MedsMEDICATIONS  (STANDING):  acetylcysteine IVPB 10 Gram(s) IV Intermittent once  acetylcysteine IVPB 10 Gram(s) IV Intermittent once  cholestyramine Powder (Sugar-Free) 4 Gram(s) Oral daily  furosemide    Tablet 40 milliGRAM(s) Oral daily  heparin   Injectable 5000 Unit(s) SubCutaneous every 8 hours  lactulose Syrup 10 Gram(s) Oral two times a day  polyethylene glycol 3350 17 Gram(s) Oral daily  prednisoLONE    3 mG/mL Solution (ORAPRED) 40 milliGRAM(s) Oral daily  spironolactone 100 milliGRAM(s) Oral daily  ursodiol Tablet 500 milliGRAM(s) Oral every 12 hours    MEDICATIONS  (PRN):  acetaminophen     Tablet .. 650 milliGRAM(s) Oral every 6 hours PRN Temp greater or equal to 38C (100.4F), Mild Pain (1 - 3)  calamine/zinc oxide Lotion 1 Application(s) Topical daily PRN Rash and/or Itching      HEALTH ISSUES - PROBLEM Dx:  Encounter for deep vein thrombosis (DVT) prophylaxis    Macrocytic anemia    Thrombocytopenia    Abnormal urinalysis    Ascites    Elevated LFTs    History of alcohol use disorder    SOB (shortness of breath)    Diarrhea    QT prolongation    Rash    Need for prophylactic measure    Hyperlipidemia    Leg swelling    Electrolyte abnormality                   Savi Nicolas, PGY1    ANILA RAMOS  57y  Male    Complaints:  Subjective:     No acute o/n events. Pt denies subj fevers/chills, HA, dizziness, chest pain, palpitations, n/v, abd pain, dysuria, diarrhea. Pt reports ~3 BM per day. Had 1 today morning.       FAMILY HISTORY:  FHx: myocardial infarction (Father)      57yVital Signs Last 24 Hrs  T(C): 36.3 (22 Jul 2023 06:19), Max: 36.9 (21 Jul 2023 10:00)  T(F): 97.4 (22 Jul 2023 06:19), Max: 98.4 (21 Jul 2023 10:00)  HR: 94 (22 Jul 2023 06:19) (87 - 94)  BP: 142/92 (22 Jul 2023 06:19) (122/71 - 142/92)  BP(mean): --  RR: 18 (22 Jul 2023 06:19) (17 - 18)  SpO2: 99% (22 Jul 2023 06:19) (96% - 99%)    Parameters below as of 22 Jul 2023 06:19  Patient On (Oxygen Delivery Method): room air          PHYSICAL EXAM:  GENERAL: NAD,  resting comfortably  HEAD:  Atraumatic, Normocephalic  EYES: EOMI, PERRL, conjunctiva nl, scleral icterus bilaterally  NECK: No JVD  CHEST/LUNG: Clear to auscultation bilaterally; No wheeze  HEART: Regular rate and rhythm; No murmurs, rubs, or gallops  ABDOMEN: Soft, Nontender, Protuberant abdomen/distended. Reducible umbilical hernia; Bowel sounds present. Dressing to right abdomen C/D/I  EXTREMITIES:  2+ Peripheral Pulses, 2+ pitting edema to lower extremities, No clubbing, cyanosis. Calves are non tender and without erythema or warmth. Left calf mildly firm  PSYCH: AAOx3  NEUROLOGY: non-focal, moving all extremities, cranial nerves intact  SKIN: Multiple areas of excoriations to extremities with scabbing   lesions      Consultant(s) Notes Reviewed:  [x ] YES  [ ] NO  Care Discussed with Consultants/Other Providers [ x] YES  [ ] NO    LABS:                          11.1   6.54  )-----------( 50       ( 21 Jul 2023 05:50 )             32.1       07-21    135  |  98  |  6<L>  ----------------------------<  109<H>  3.5   |  22  |  0.59    Ca    8.0<L>      21 Jul 2023 05:50  Phos  2.5     07-21  Mg     1.30     07-21    TPro  6.5  /  Alb  2.8<L>  /  TBili  5.5<H>  /  DBili  x   /  AST  55<H>  /  ALT  13  /  AlkPhos  236<H>  07-21              Urinalysis Basic - ( 21 Jul 2023 05:50 )    Color: x / Appearance: x / SG: x / pH: x  Gluc: 109 mg/dL / Ketone: x  / Bili: x / Urobili: x   Blood: x / Protein: x / Nitrite: x   Leuk Esterase: x / RBC: x / WBC x   Sq Epi: x / Non Sq Epi: x / Bacteria: x        PT/INR - ( 21 Jul 2023 05:50 )   PT: 22.3 sec;   INR: 1.91 ratio         PTT - ( 21 Jul 2023 05:50 )  PTT:39.3 sec          CAPILLARY BLOOD GLUCOSE                          Male  RADIOLOGY & ADDITIONAL TESTS:    Imaging Personally Reviewed:  [ ] YES  [ ] NO    MedsMEDICATIONS  (STANDING):  acetylcysteine IVPB 10 Gram(s) IV Intermittent once  acetylcysteine IVPB 10 Gram(s) IV Intermittent once  cholestyramine Powder (Sugar-Free) 4 Gram(s) Oral daily  furosemide    Tablet 40 milliGRAM(s) Oral daily  heparin   Injectable 5000 Unit(s) SubCutaneous every 8 hours  lactulose Syrup 10 Gram(s) Oral two times a day  polyethylene glycol 3350 17 Gram(s) Oral daily  prednisoLONE    3 mG/mL Solution (ORAPRED) 40 milliGRAM(s) Oral daily  spironolactone 100 milliGRAM(s) Oral daily  ursodiol Tablet 500 milliGRAM(s) Oral every 12 hours    MEDICATIONS  (PRN):  acetaminophen     Tablet .. 650 milliGRAM(s) Oral every 6 hours PRN Temp greater or equal to 38C (100.4F), Mild Pain (1 - 3)  calamine/zinc oxide Lotion 1 Application(s) Topical daily PRN Rash and/or Itching      HEALTH ISSUES - PROBLEM Dx:  Encounter for deep vein thrombosis (DVT) prophylaxis    Macrocytic anemia    Thrombocytopenia    Abnormal urinalysis    Ascites    Elevated LFTs    History of alcohol use disorder    SOB (shortness of breath)    Diarrhea    QT prolongation    Rash    Need for prophylactic measure    Hyperlipidemia    Leg swelling    Electrolyte abnormality                   Savi Nicolas, PGY1    ANILA RAMOS  57y  Male    Complaints:  Subjective:     No acute o/n events. Pt denies subj fevers/chills, HA, dizziness, chest pain, palpitations, n/v, abd pain, dysuria, diarrhea. Pt reports ~3 BM per day. Had 1 today morning.       FAMILY HISTORY:  FHx: myocardial infarction (Father)      57yVital Signs Last 24 Hrs  T(C): 36.3 (22 Jul 2023 06:19), Max: 36.9 (21 Jul 2023 10:00)  T(F): 97.4 (22 Jul 2023 06:19), Max: 98.4 (21 Jul 2023 10:00)  HR: 94 (22 Jul 2023 06:19) (87 - 94)  BP: 142/92 (22 Jul 2023 06:19) (122/71 - 142/92)  BP(mean): --  RR: 18 (22 Jul 2023 06:19) (17 - 18)  SpO2: 99% (22 Jul 2023 06:19) (96% - 99%)    Parameters below as of 22 Jul 2023 06:19  Patient On (Oxygen Delivery Method): room air          PHYSICAL EXAM:  GENERAL: NAD,  resting comfortably  HEAD:  Atraumatic, Normocephalic  EYES: EOMI, PERRL, conjunctiva nl, scleral icterus bilaterally  NECK: No JVD  CHEST/LUNG: Clear to auscultation bilaterally; No wheeze  HEART: Regular rate and rhythm; No murmurs, rubs, or gallops  ABDOMEN: Soft, Nontender, Protuberant abdomen/distended. Reducible umbilical hernia; Bowel sounds present. Dressing to right abdomen C/D/I  EXTREMITIES:  2+ Peripheral Pulses, 2+ pitting edema to lower extremities, No clubbing, cyanosis. Calves are non tender and without erythema or warmth. Left calf mildly firm  PSYCH: AAOx3  NEUROLOGY: non-focal, moving all extremities, cranial nerves intact  SKIN: Multiple areas of excoriations to extremities with scabbing   lesions      Consultant(s) Notes Reviewed:  [x ] YES  [ ] NO  Care Discussed with Consultants/Other Providers [ x] YES  [ ] NO    LABS:                          11.1   6.54  )-----------( 50       ( 21 Jul 2023 05:50 )             32.1       07-21    135  |  98  |  6<L>  ----------------------------<  109<H>  3.5   |  22  |  0.59    Ca    8.0<L>      21 Jul 2023 05:50  Phos  2.5     07-21  Mg     1.30     07-21    TPro  6.5  /  Alb  2.8<L>  /  TBili  5.5<H>  /  DBili  x   /  AST  55<H>  /  ALT  13  /  AlkPhos  236<H>  07-21              Urinalysis Basic - ( 21 Jul 2023 05:50 )    Color: x / Appearance: x / SG: x / pH: x  Gluc: 109 mg/dL / Ketone: x  / Bili: x / Urobili: x   Blood: x / Protein: x / Nitrite: x   Leuk Esterase: x / RBC: x / WBC x   Sq Epi: x / Non Sq Epi: x / Bacteria: x        PT/INR - ( 21 Jul 2023 05:50 )   PT: 22.3 sec;   INR: 1.91 ratio         PTT - ( 21 Jul 2023 05:50 )  PTT:39.3 sec          CAPILLARY BLOOD GLUCOSE            Male  RADIOLOGY & ADDITIONAL TESTS:    Imaging Personally Reviewed:  [ ] YES  [ ] NO    MedsMEDICATIONS  (STANDING):  acetylcysteine IVPB 10 Gram(s) IV Intermittent once  acetylcysteine IVPB 10 Gram(s) IV Intermittent once  cholestyramine Powder (Sugar-Free) 4 Gram(s) Oral daily  furosemide    Tablet 40 milliGRAM(s) Oral daily  heparin   Injectable 5000 Unit(s) SubCutaneous every 8 hours  lactulose Syrup 10 Gram(s) Oral two times a day  polyethylene glycol 3350 17 Gram(s) Oral daily  prednisoLONE    3 mG/mL Solution (ORAPRED) 40 milliGRAM(s) Oral daily  spironolactone 100 milliGRAM(s) Oral daily  ursodiol Tablet 500 milliGRAM(s) Oral every 12 hours    MEDICATIONS  (PRN):  acetaminophen     Tablet .. 650 milliGRAM(s) Oral every 6 hours PRN Temp greater or equal to 38C (100.4F), Mild Pain (1 - 3)  calamine/zinc oxide Lotion 1 Application(s) Topical daily PRN Rash and/or Itching      HEALTH ISSUES - PROBLEM Dx:  Encounter for deep vein thrombosis (DVT) prophylaxis    Macrocytic anemia    Thrombocytopenia    Abnormal urinalysis    Ascites    Elevated LFTs    History of alcohol use disorder    SOB (shortness of breath)    Diarrhea    QT prolongation    Rash    Need for prophylactic measure    Hyperlipidemia    Leg swelling    Electrolyte abnormality

## 2023-07-22 NOTE — PROGRESS NOTE ADULT - PROBLEM SELECTOR PLAN 1
Pt with AUD p/w 1.5 weeks of increasing abdominal distention and b/l LE edema. Concerning for ascites in setting of possible liver failure from alcohol use vs. Budd Chiari syndrome, less likely malignant ascites or from acute decompensated heart failure. Ascites and cirrhosis confirmed on US. s/p paracentesis with 16L removed. Patient feeling much better after having fluid removed. Peritoneal fluid showing 157 nucleated cell count r/o SBP. Fluid Tprotein 2.4, LDH 83, glucose 115, alb 1.0. Fluid gram stain w/ PML, no organisms. SAAG 1.8 - in setting of unremarkable TTE, ascites 2/2 portal hypertension, likely alcoholic cirrhosis.   -GI/hepatology consulted appreciated recs  -s/p IV albumin 25%   -f/u CT angio abd w/ oral and IV contrast  -serial abdominal exams  -c/w Lasix 40mg PO, spironolactone 100mg PO  -c/w Miralax daily  -Will start Lactulose 20g BID for HE prevention, goal 3 BM per day, uptitrate as needed  -EGD/transplant screen as outpatient Pt with AUD p/w 1.5 weeks of increasing abdominal distention and b/l LE edema. Concerning for ascites in setting of possible liver failure from alcohol use vs. Budd Chiari syndrome, less likely malignant ascites or from acute decompensated heart failure. Ascites and cirrhosis confirmed on US. s/p paracentesis with 16L removed. Patient feeling much better after having fluid removed. Peritoneal fluid showing 157 nucleated cell count r/o SBP. Fluid Tprotein 2.4, LDH 83, glucose 115, alb 1.0. Fluid gram stain w/ PML, no organisms. SAAG 1.8 - in setting of unremarkable TTE, ascites 2/2 portal hypertension, likely alcoholic cirrhosis.   -GI/hepatology consulted appreciated recs  -s/p IV albumin 25%   -f/u CT angio abd w/ oral and IV contrast  -serial abdominal exams  -c/w Lasix 40mg PO, spironolactone 100mg PO  -c/w Miralax daily  -Will start Lactulose 20g BID for HE prevention, goal 3 BM per day, uptitrate as needed  -EGD/transplant screen as outpatient  -CT abd/pelvis w/ nodular liver. No evidence of PVT/Budd chiarri synd

## 2023-07-22 NOTE — PROGRESS NOTE ADULT - SUBJECTIVE AND OBJECTIVE BOX
Gastroenterology/Hepatology Progress Note    Interval Events:     Allergies:  cortisone (Swelling; Rash)    Hospital Medications:  acetaminophen     Tablet .. 650 milliGRAM(s) Oral every 6 hours PRN  acetylcysteine IVPB 10 Gram(s) IV Intermittent once  calamine/zinc oxide Lotion 1 Application(s) Topical daily PRN  cholestyramine Powder (Sugar-Free) 4 Gram(s) Oral daily  furosemide    Tablet 40 milliGRAM(s) Oral daily  heparin   Injectable 5000 Unit(s) SubCutaneous every 8 hours  lactulose Syrup 10 Gram(s) Oral two times a day  polyethylene glycol 3350 17 Gram(s) Oral daily  prednisoLONE    3 mG/mL Solution (ORAPRED) 40 milliGRAM(s) Oral daily  spironolactone 100 milliGRAM(s) Oral daily  ursodiol Tablet 500 milliGRAM(s) Oral every 12 hours      ROS: 14 point ROS negative unless otherwise state in subjective    PHYSICAL EXAM:   Vital Signs:  Vital Signs Last 24 Hrs  T(C): 36.3 (22 Jul 2023 06:19), Max: 36.8 (21 Jul 2023 20:22)  T(F): 97.4 (22 Jul 2023 06:19), Max: 98.2 (21 Jul 2023 20:22)  HR: 94 (22 Jul 2023 06:19) (87 - 94)  BP: 142/92 (22 Jul 2023 06:19) (122/71 - 142/92)  BP(mean): --  RR: 18 (22 Jul 2023 06:19) (18 - 18)  SpO2: 99% (22 Jul 2023 06:19) (97% - 99%)    Parameters below as of 22 Jul 2023 06:19  Patient On (Oxygen Delivery Method): room air      Daily     Daily     GENERAL:  No acute distress  HEENT:  NCAT, + scleral icterus  CHEST: no resp distress  HEART:  RRR  ABDOMEN:  Soft, non-tender, non-distended   EXTREMITIES:  No cyanosis, clubbing. +LE edema  SKIN:  No rash/erythema/ecchymoses.    NEURO:  Alert and oriented x 3, no asterixis     LABS:                        11.1   6.54  )-----------( 50       ( 21 Jul 2023 05:50 )             32.1       07-21    135  |  98  |  6<L>  ----------------------------<  109<H>  3.5   |  22  |  0.59    Ca    8.0<L>      21 Jul 2023 05:50  Phos  2.5     07-21  Mg     1.30     07-21    TPro  6.5  /  Alb  2.8<L>  /  TBili  5.5<H>  /  DBili  x   /  AST  55<H>  /  ALT  13  /  AlkPhos  236<H>  07-21    LIVER FUNCTIONS - ( 21 Jul 2023 05:50 )  Alb: 2.8 g/dL / Pro: 6.5 g/dL / ALK PHOS: 236 U/L / ALT: 13 U/L / AST: 55 U/L / GGT: x           PT/INR - ( 21 Jul 2023 05:50 )   PT: 22.3 sec;   INR: 1.91 ratio         PTT - ( 21 Jul 2023 05:50 )  PTT:39.3 sec  Urinalysis Basic - ( 21 Jul 2023 05:50 )    Color: x / Appearance: x / SG: x / pH: x  Gluc: 109 mg/dL / Ketone: x  / Bili: x / Urobili: x   Blood: x / Protein: x / Nitrite: x   Leuk Esterase: x / RBC: x / WBC x   Sq Epi: x / Non Sq Epi: x / Bacteria: x      Imaging:  pending final read CT A/P IVC         Gastroenterology/Hepatology Progress Note    Interval Events: Patient endorses feeling well today. Denies abdominal pain, fevers, chills, SOB. He had a BM this morning, denies having diarrhea.    Allergies:  cortisone (Swelling; Rash)    Hospital Medications:  acetaminophen     Tablet .. 650 milliGRAM(s) Oral every 6 hours PRN  acetylcysteine IVPB 10 Gram(s) IV Intermittent once  calamine/zinc oxide Lotion 1 Application(s) Topical daily PRN  cholestyramine Powder (Sugar-Free) 4 Gram(s) Oral daily  furosemide    Tablet 40 milliGRAM(s) Oral daily  heparin   Injectable 5000 Unit(s) SubCutaneous every 8 hours  lactulose Syrup 10 Gram(s) Oral two times a day  polyethylene glycol 3350 17 Gram(s) Oral daily  prednisoLONE    3 mG/mL Solution (ORAPRED) 40 milliGRAM(s) Oral daily  spironolactone 100 milliGRAM(s) Oral daily  ursodiol Tablet 500 milliGRAM(s) Oral every 12 hours      ROS: 14 point ROS negative unless otherwise state in subjective    PHYSICAL EXAM:   Vital Signs:  Vital Signs Last 24 Hrs  T(C): 36.3 (22 Jul 2023 06:19), Max: 36.8 (21 Jul 2023 20:22)  T(F): 97.4 (22 Jul 2023 06:19), Max: 98.2 (21 Jul 2023 20:22)  HR: 94 (22 Jul 2023 06:19) (87 - 94)  BP: 142/92 (22 Jul 2023 06:19) (122/71 - 142/92)  BP(mean): --  RR: 18 (22 Jul 2023 06:19) (18 - 18)  SpO2: 99% (22 Jul 2023 06:19) (97% - 99%)    Parameters below as of 22 Jul 2023 06:19  Patient On (Oxygen Delivery Method): room air      Daily     Daily     GENERAL:  No acute distress  HEENT:  NCAT, + scleral icterus  CHEST: no resp distress  HEART:  RRR  ABDOMEN:  Soft, non-tender, non-distended   EXTREMITIES:  No cyanosis, clubbing. +LE edema  SKIN:  No rash/erythema/ecchymoses.    NEURO:  Alert and oriented x 3, no asterixis     LABS:                        11.1   6.54  )-----------( 50       ( 21 Jul 2023 05:50 )             32.1       07-21    135  |  98  |  6<L>  ----------------------------<  109<H>  3.5   |  22  |  0.59    Ca    8.0<L>      21 Jul 2023 05:50  Phos  2.5     07-21  Mg     1.30     07-21    TPro  6.5  /  Alb  2.8<L>  /  TBili  5.5<H>  /  DBili  x   /  AST  55<H>  /  ALT  13  /  AlkPhos  236<H>  07-21    LIVER FUNCTIONS - ( 21 Jul 2023 05:50 )  Alb: 2.8 g/dL / Pro: 6.5 g/dL / ALK PHOS: 236 U/L / ALT: 13 U/L / AST: 55 U/L / GGT: x           PT/INR - ( 21 Jul 2023 05:50 )   PT: 22.3 sec;   INR: 1.91 ratio         PTT - ( 21 Jul 2023 05:50 )  PTT:39.3 sec  Urinalysis Basic - ( 21 Jul 2023 05:50 )    Color: x / Appearance: x / SG: x / pH: x  Gluc: 109 mg/dL / Ketone: x  / Bili: x / Urobili: x   Blood: x / Protein: x / Nitrite: x   Leuk Esterase: x / RBC: x / WBC x   Sq Epi: x / Non Sq Epi: x / Bacteria: x      Imaging:  pending final read CT A/P IVC

## 2023-07-22 NOTE — PROGRESS NOTE ADULT - PROBLEM SELECTOR PLAN 3
Hgb stable at 12.0>12.1>10.8>11.1 with .5, consistent with macrocytic anemia, likely 2/2 alcohol use. B12/folate WNL. Hemolytic anemia considered () however less likely given Retic 3.0%, haptoglobin 105.  -c/w folic acid supplement daily

## 2023-07-22 NOTE — PROGRESS NOTE ADULT - PROBLEM SELECTOR PLAN 4
Pt drinks 3-5 "double glass" of [place] whiskey after work for the past 25 years. Denies hx of withdrawal syndrome. Last drink a week ago, but pt with elevated blood alcohol level. On exam, no tongue fasciculations or tremors.  -s/p librium x1  -CIWA 0 throughout admit, no withdrawal sign/sx -  CIWA protocol d/c 7/21  -thiamine 100 mg 7/18-7/22  -folic acid 1 mcg daily  -aspiration and seizure precautions  -fall risk protocol  -SW offered but declined

## 2023-07-22 NOTE — PROGRESS NOTE ADULT - PROBLEM SELECTOR PLAN 2
Pt with elevated t bili to 9.0, d bili 4.5, alk phos 388, AST 91, and ALT 18. Elevated GGT suggestive of cholestatic disease. Concerning for cholestasis/biliary obstruction with component of hepatocellular injury/hepatitis 2/2 alcohol use. Ceruloplasmin 2.4.   -[facility] GI/Hepatology recs  -trend LFTs  -s/p loading dose NAC 150mg/kg/day and maintenance dose 100mg/kg/day   -c/w ursodiol 500mg BID  -f/u ASMA, MARTA, IGG  -f/u bcx, HIV, Hep A  -f/u CT angio abd w/ oral and IV contrast

## 2023-07-22 NOTE — PROGRESS NOTE ADULT - PROBLEM SELECTOR PLAN 8
on exam has multiple areas of excoriation and scabbing to all extremities. Pt reports he has intermittent diffuse skin pruritis, not experiencing at this time. Has tried multiple OTC creams to no effect. Pruritis likely 2/2 elevated bilirubin and liver dysfunction.   -c/w cholestyramine 4g daily  -c/w calamine lotion  -monitor

## 2023-07-22 NOTE — PROGRESS NOTE ADULT - ASSESSMENT
58yo w/ PMHx AUD, remote hx DVT (not on AC, ?if provoked) presenting w/ jaundice and ascites in setting heavy alcohol use c/f alcoholic hepatitis +/- cirrhosis    #Alcoholic hepatitis: Suspect diagnosis of alcoholic hepatitis given significant ongoing alcohol use, juandice, AST >50, AST/ALT>1.5, total bili >3. Maddrey 27.4--> 49 today. Less likely ischemic hepatitis, DILI (no new drugs outside of zquil over the course of a week). UA unremarkable and though C. diff positive, no diarrhea per patient, ascitic cxs neg.  # Cirrhosis -  Most likely 2/2 cirrhosis (in setting AUD) based on hx and lab findings (low plts, albumin, elevated INR), though should be confirmed by imaging (pending CT A/P) and other etiologies of cirrhosis evaluated. Fluid studies c/w portal HTN. No e/o PVT on U/S. TTE WNL. Clinically is volume up w/o asterixis. Will need screening EGD. Adequate diuresis based on Sheila  CT A/P IVC 7/21: Cirrhosis with moderate ascites. Splenomegaly with gastroesophageal varices suggestive of portal hypertension. Portal vein is patent. Pending final read.  MELD 20 (7/20)-->23 (7/21), B+  Ascites: s/p 16 L removed 7/20  EV: no prior EGD  HCC: pending final read CT A/P  HAV IgM/IgG negative, would need immunization  #AUD: Pt feels he can quit easily and not currently interested in pharmacological assistance but may benefit from discussion on rehab/group programs     Recommendations  - c/w prednisolone 40mg daily given MDF>32 and no signs of active infection  -Ursodiol 500mg BID  -trend CBC, CMP, INR  - etoh cessation  - CIWA per primary team  -Send Hep A Ab levels  -F/u CT A/P w/ IVC   -c/w lasix 40 mg, aldactone 100mg for diuresis  -Clinical monitoring for HE  -Will need screening EGD- likely while inpatient though will defer until infectious w/u complete  -Will begin eval for OLT     Hepatology will continue to follow.     All recommendations are tentative until note is attested by attending.     Piper Catsañeda, PGY6  Gastroenterology/Hepatology Fellow  Available on [employer] Teams  [phone] ([employer] Short Range Pager)  [phone] (Long Range Pager)    After 5pm, please contact the on-call GI fellow. [phone] 58yo w/ PMHx AUD, remote hx DVT (not on AC, ?if provoked) presenting w/ jaundice and ascites in setting heavy alcohol use c/f alcoholic hepatitis +/- cirrhosis    #Alcoholic hepatitis: Suspect diagnosis of alcoholic hepatitis given significant ongoing alcohol use, juandice, AST >50, AST/ALT>1.5, total bili >3. Maddrey 27.4--> 49 today. Less likely ischemic hepatitis, DILI (no new drugs outside of zquil over the course of a week). UA unremarkable and though C. diff positive, no diarrhea per patient, ascitic cxs neg.  # Cirrhosis -  Most likely 2/2 cirrhosis (in setting AUD) based on hx and lab findings (low plts, albumin, elevated INR), though should be confirmed by imaging (pending CT A/P) and other etiologies of cirrhosis evaluated. Fluid studies c/w portal HTN. No e/o PVT on U/S. TTE WNL. Clinically is volume up w/o asterixis. Will need screening EGD. Adequate diuresis based on Sheila  CT A/P IVC 7/21: Cirrhosis with moderate ascites. Splenomegaly with gastroesophageal varices suggestive of portal hypertension. Portal vein is patent. Pending final read.  MELD 20 (7/20)-->23 (7/21), B+  Ascites: s/p 16 L removed 7/20  EV: no prior EGD  HCC: pending final read CT A/P  HAV IgM/IgG negative, would need immunization  #AUD: Pt feels he can quit easily and not currently interested in pharmacological assistance but may benefit from discussion on rehab/group programs     Recommendations  - c/w prednisolone 40mg daily given MDF>32 and no signs of active infection  -Ursodiol 500mg BID  -trend CBC, CMP, INR  - etoh cessation  - CIWA per primary team  -F/u CT A/P w/ IVC   -c/w lasix 40 mg, aldactone 100mg for diuresis  -Clinical monitoring for HE  -Will need screening EGD- likely while inpatient though will defer until infectious w/u complete  -Will begin eval for OLT   - recommend ID consultation given positive C diff PCR    Hepatology will continue to follow.     All recommendations are tentative until note is attested by attending.     Piper Castañeda, PGY6  Gastroenterology/Hepatology Fellow  Available on [employer] Teams  [phone] ([employer] Short Range Pager)  [phone] (Long Range Pager)    After 5pm, please contact the on-call GI fellow. [phone]

## 2023-07-22 NOTE — PROGRESS NOTE ADULT - ATTENDING COMMENTS
57M with PMH of AUD, ?prior DVT no longer on AC who is presenting with increasing abdominal distention and b/l LE edema secondary to acute decompensated alcoholic cirrhosis.     Reports feeling well. Denies pain. Eating fine. Having formed BMs. On physical exam, noted for distended abdomen (per patient, improved) and b/l peripheral edema.    # AUD  # Alcoholic Hepatitis   # Acute decompensated alcoholic cirrhosis   - no h/o withdrawals, CIWAs have been negative, SBIRT completed, will d/c CIWA monitoring today  - s/p paracentesis 7/20 with 16L removed; fluid analysis not consistent with SBP. f/u culture   - Hepatology following - c/w NAC protocol, ursodiol; no plans for steroids at this time   - Screening EGD in patient vs outpatient     # + C diff - given that patient is having formed stool, suspect this is likely secondary to colonization, will hold off Rx at this time   # Anemia, Thrombocytopenia, Coagulopathy  - in setting of cirrhosis, continue to monitor. 57M with PMH of AUD, ?prior DVT no longer on AC who is presenting with increasing abdominal distention and b/l LE edema secondary to acute decompensated alcoholic cirrhosis.     Reports feeling well. Denies pain. Eating fine. Having formed BMs. On physical exam, noted for distended abdomen and b/l peripheral edema which both are improving per patient.    # AUD  # Alcoholic Hepatitis   # Acute decompensated alcoholic cirrhosis   - no h/o withdrawals, s/p CIWA protocol  - s/p paracentesis 7/20 with 16L removed; fluid analysis not consistent with SBP. f/u culture   - Hepatology following - c/w NAC protocol, ursodiol; initiated steroid on 7/21.  - Screening EGD in patient vs outpatient     # + C diff - given that patient is having formed stool, suspect this is likely secondary to colonization, will hold off Rx at this time   # Anemia, Thrombocytopenia, Coagulopathy  - in setting of cirrhosis, continue to monitor.

## 2023-07-22 NOTE — PROGRESS NOTE ADULT - ASSESSMENT
58 yo M with PMHx of EtOH abuse (3-5 double [place] whiskey 5 days a week x25 years) and DVT presents for swelling of abdomen and legs for past 10 days, likely ascites 2/2 alcoholic cirrhosis.

## 2023-07-22 NOTE — PROGRESS NOTE ADULT - PROBLEM SELECTOR PLAN 11
as of 7/21, mag 1.3, phos 2.5, potassium 3.5. Likely in setting of chronic alcohol use.   -Goal Mag 2.0, Phos 3.0, Potassium 4.0  -will replete as needed

## 2023-07-22 NOTE — PROGRESS NOTE ADULT - TIME BILLING
Face to face encounter. Reviewed labs, vitals, imaging. Reviewed GI recs, hospital course. Discussed plan of care with patient.

## 2023-07-22 NOTE — PROGRESS NOTE ADULT - PROBLEM SELECTOR PLAN 7
Plt count 58>44>52>50. Also with elevated PT/PTT, elevated D-dimer, decreased fibrinogen. Suspect coagulopathies from bone marrow suppression in setting of chronic alcohol use and possibly from liver failure. Low suspicion for TTP given lack of fevers, renal failure, and AMS. DIC also considered but unlikely given normal haptoglobin  -monitor plt  -transfuse <10, <20k and febrile, and <50k w/ active bleeding

## 2023-07-23 DIAGNOSIS — E87.1 HYPO-OSMOLALITY AND HYPONATREMIA: ICD-10-CM

## 2023-07-23 LAB
ALBUMIN SERPL ELPH-MCNC: 2.9 G/DL — LOW (ref 3.3–5)
ALP SERPL-CCNC: 235 U/L — HIGH (ref 40–120)
ALT FLD-CCNC: 15 U/L — SIGNIFICANT CHANGE UP (ref 4–41)
ANION GAP SERPL CALC-SCNC: 7 MMOL/L — SIGNIFICANT CHANGE UP (ref 7–14)
APTT BLD: 40.6 SEC — HIGH (ref 27–36.3)
AST SERPL-CCNC: 42 U/L — HIGH (ref 4–40)
BILIRUB SERPL-MCNC: 4.1 MG/DL — HIGH (ref 0.2–1.2)
BUN SERPL-MCNC: 11 MG/DL — SIGNIFICANT CHANGE UP (ref 7–23)
CALCIUM SERPL-MCNC: 8.1 MG/DL — LOW (ref 8.4–10.5)
CHLORIDE SERPL-SCNC: 100 MMOL/L — SIGNIFICANT CHANGE UP (ref 98–107)
CO2 SERPL-SCNC: 24 MMOL/L — SIGNIFICANT CHANGE UP (ref 22–31)
CREAT SERPL-MCNC: 0.69 MG/DL — SIGNIFICANT CHANGE UP (ref 0.5–1.3)
EGFR: 109 ML/MIN/1.73M2 — SIGNIFICANT CHANGE UP
GLUCOSE SERPL-MCNC: 104 MG/DL — HIGH (ref 70–99)
HCT VFR BLD CALC: 33.8 % — LOW (ref 39–50)
HGB BLD-MCNC: 11.4 G/DL — LOW (ref 13–17)
INR BLD: 1.71 RATIO — HIGH (ref 0.88–1.16)
MAGNESIUM SERPL-MCNC: 1.9 MG/DL — SIGNIFICANT CHANGE UP (ref 1.6–2.6)
MCHC RBC-ENTMCNC: 33.7 GM/DL — SIGNIFICANT CHANGE UP (ref 32–36)
MCHC RBC-ENTMCNC: 34.9 PG — HIGH (ref 27–34)
MCV RBC AUTO: 103.4 FL — HIGH (ref 80–100)
NRBC # BLD: 0 /100 WBCS — SIGNIFICANT CHANGE UP (ref 0–0)
NRBC # FLD: 0 K/UL — SIGNIFICANT CHANGE UP (ref 0–0)
OSMOLALITY SERPL: 283 MOSM/KG — SIGNIFICANT CHANGE UP (ref 275–295)
OSMOLALITY UR: 320 MOSM/KG — SIGNIFICANT CHANGE UP (ref 50–1200)
PHOSPHATE SERPL-MCNC: 2.6 MG/DL — SIGNIFICANT CHANGE UP (ref 2.5–4.5)
PLATELET # BLD AUTO: 59 K/UL — LOW (ref 150–400)
POTASSIUM SERPL-MCNC: 3.5 MMOL/L — SIGNIFICANT CHANGE UP (ref 3.5–5.3)
POTASSIUM SERPL-SCNC: 3.5 MMOL/L — SIGNIFICANT CHANGE UP (ref 3.5–5.3)
PROT SERPL-MCNC: 6.6 G/DL — SIGNIFICANT CHANGE UP (ref 6–8.3)
PROTHROM AB SERPL-ACNC: 19.9 SEC — HIGH (ref 10.5–13.4)
RBC # BLD: 3.27 M/UL — LOW (ref 4.2–5.8)
RBC # FLD: 14.9 % — HIGH (ref 10.3–14.5)
SODIUM SERPL-SCNC: 131 MMOL/L — LOW (ref 135–145)
WBC # BLD: 9.86 K/UL — SIGNIFICANT CHANGE UP (ref 3.8–10.5)
WBC # FLD AUTO: 9.86 K/UL — SIGNIFICANT CHANGE UP (ref 3.8–10.5)

## 2023-07-23 PROCEDURE — 99232 SBSQ HOSP IP/OBS MODERATE 35: CPT

## 2023-07-23 PROCEDURE — 99222 1ST HOSP IP/OBS MODERATE 55: CPT

## 2023-07-23 RX ORDER — ACETYLCYSTEINE 200 MG/ML
10 VIAL (ML) MISCELLANEOUS ONCE
Refills: 0 | Status: COMPLETED | OUTPATIENT
Start: 2023-07-23 | End: 2023-07-23

## 2023-07-23 RX ORDER — MAGNESIUM SULFATE 500 MG/ML
2 VIAL (ML) INJECTION ONCE
Refills: 0 | Status: DISCONTINUED | OUTPATIENT
Start: 2023-07-23 | End: 2023-07-23

## 2023-07-23 RX ORDER — SODIUM,POTASSIUM PHOSPHATES 278-250MG
1 POWDER IN PACKET (EA) ORAL ONCE
Refills: 0 | Status: COMPLETED | OUTPATIENT
Start: 2023-07-23 | End: 2023-07-23

## 2023-07-23 RX ORDER — LACTULOSE 10 G/15ML
20 SOLUTION ORAL
Refills: 0 | Status: DISCONTINUED | OUTPATIENT
Start: 2023-07-23 | End: 2023-07-25

## 2023-07-23 RX ADMIN — LACTULOSE 20 GRAM(S): 10 SOLUTION ORAL at 17:42

## 2023-07-23 RX ADMIN — Medication 40 MILLIGRAM(S): at 05:15

## 2023-07-23 RX ADMIN — LACTULOSE 10 GRAM(S): 10 SOLUTION ORAL at 05:14

## 2023-07-23 RX ADMIN — Medication 65.63 GRAM(S): at 01:06

## 2023-07-23 RX ADMIN — Medication 65.63 GRAM(S): at 16:22

## 2023-07-23 RX ADMIN — POLYETHYLENE GLYCOL 3350 17 GRAM(S): 17 POWDER, FOR SOLUTION ORAL at 11:35

## 2023-07-23 RX ADMIN — HEPARIN SODIUM 5000 UNIT(S): 5000 INJECTION INTRAVENOUS; SUBCUTANEOUS at 22:35

## 2023-07-23 RX ADMIN — URSODIOL 500 MILLIGRAM(S): 250 TABLET, FILM COATED ORAL at 05:16

## 2023-07-23 RX ADMIN — SPIRONOLACTONE 100 MILLIGRAM(S): 25 TABLET, FILM COATED ORAL at 05:12

## 2023-07-23 RX ADMIN — CHOLESTYRAMINE 4 GRAM(S): 4 POWDER, FOR SUSPENSION ORAL at 08:48

## 2023-07-23 RX ADMIN — URSODIOL 500 MILLIGRAM(S): 250 TABLET, FILM COATED ORAL at 17:42

## 2023-07-23 RX ADMIN — HEPARIN SODIUM 5000 UNIT(S): 5000 INJECTION INTRAVENOUS; SUBCUTANEOUS at 05:12

## 2023-07-23 RX ADMIN — Medication 40 MILLIGRAM(S): at 05:13

## 2023-07-23 RX ADMIN — Medication 1 PACKET(S): at 10:50

## 2023-07-23 RX ADMIN — HEPARIN SODIUM 5000 UNIT(S): 5000 INJECTION INTRAVENOUS; SUBCUTANEOUS at 13:42

## 2023-07-23 NOTE — PROGRESS NOTE ADULT - PROBLEM SELECTOR PLAN 3
Hgb stable at 12.0>12.1>10.8>11.1 with .5, consistent with macrocytic anemia, likely 2/2 alcohol use. B12/folate WNL. Hemolytic anemia considered () however less likely given Retic 3.0%, haptoglobin 105.  -c/w folic acid supplement daily Hgb stable at 12.0>12.1>10.8>11.1 with .5, consistent with macrocytic anemia, likely 2/2 alcohol use. B12/folate WNL. Hemolytic anemia considered () however less likely given Retic 3.0%, haptoglobin 105.  -c/w folic acid supplement daily  -monitor

## 2023-07-23 NOTE — PROGRESS NOTE ADULT - ATTENDING COMMENTS
57M with PMH of AUD, ?prior DVT no longer on AC who is presenting with increasing abdominal distention and b/l LE edema secondary to acute decompensated alcoholic cirrhosis.     Reports feeling well. Denies pain. Eating fine. Has not had BM today. On physical exam, noted for distended abdomen and b/l peripheral edema which both are improving per patient.    # AUD  # Alcoholic Hepatitis   # Acute decompensated alcoholic cirrhosis   - no h/o withdrawals, s/p CIWA protocol  - s/p paracentesis 7/20 with 16L removed; fluid analysis not consistent with SBP. f/u culture   - Hepatology following - c/w NAC protocol, ursodiol; initiated steroid on 7/21.  - Screening EGD in patient vs outpatient     # + C diff - given that patient is having formed stool, suspect this is likely secondary to colonization, will hold off Rx at this time. Will clarify with GI if ID consult needed  # Anemia, Thrombocytopenia, Coagulopathy  - in setting of cirrhosis, continue to monitor.

## 2023-07-23 NOTE — PROGRESS NOTE ADULT - PROBLEM SELECTOR PLAN 7
Plt count 58>44>52>50. Also with elevated PT/PTT, elevated D-dimer, decreased fibrinogen. Suspect coagulopathies from bone marrow suppression in setting of chronic alcohol use and possibly from liver failure. Low suspicion for TTP given lack of fevers, renal failure, and AMS. DIC also considered but unlikely given normal haptoglobin  -monitor plt  -transfuse <10, <20k and febrile, and <50k w/ active bleeding Plt count 58>44>52>50 > 59 (7/23). Also with elevated PT/PTT, elevated D-dimer, decreased fibrinogen on admission. Suspect coagulopathies from bone marrow suppression in setting of chronic alcohol use and possibly from liver failure. Low suspicion for TTP given lack of fevers, renal failure, and AMS. DIC also considered but unlikely given normal haptoglobin. Overall downtrending as of 7/23.  -monitor plt  -transfuse <10, <20k and febrile, and <50k w/ active bleeding Plt count 58>44>52>50 > 59 (7/23). Also with elevated PT/PTT, elevated D-dimer, decreased fibrinogen on admission. Suspect coagulopathies from bone marrow suppression in setting of chronic alcohol use and possibly from liver failure. Low suspicion for TTP given lack of fevers, renal failure, and AMS. DIC also considered but unlikely given normal haptoglobin. PT/PTT/INR improving as of 7/23.  -monitor plt  -transfuse <10, <20k and febrile, and <50k w/ active bleeding Plt count 58>44>52>50 > 59 (7/23). Also with elevated PT/PTT, elevated D-dimer, decreased fibrinogen on admission. Suspect coagulopathies from bone marrow suppression in setting of chronic alcohol use and possibly from liver failure. Low suspicion for TTP given lack of fevers, renal failure, and AMS. DIC also considered but unlikely given normal haptoglobin. PT/PTT/INR downtrending as of 7/23.  -monitor plt  -transfuse <10, <20k and febrile, and <50k w/ active bleeding

## 2023-07-23 NOTE — CONSULT NOTE ADULT - SUBJECTIVE AND OBJECTIVE BOX
HPI:    57 year old Male with PMHx of alcohol use disorder presented on 7/18 with increasing abdominal distention and b/l LE edema since last Saturday. For last 1.5 wks, he has also been taking 'gulps' of NyQuil (acetaminophen 1000, benadryl 50) for sleep only once daily or every other day. Pt has not seen a doctor since before COVID because it was difficult to get an appointment. Pt stated that since the swelling started, his bowel movements were getting more loose, and his kids noticed his eyes were yellow. He also feels SOB at rest and with exertion. Lying at angle makes the SOB more tolerable.  Denied abdominal pain, CP, nausea or vomiting. He stated he has not eaten since last night because he did not want to make the swelling worse. This morning, pt started having multiple episodes of watery nonbloody diarrhea, at least 5 before coming to the ED. Pt also with rashes below the knees which started a few months ago. He tried creams which did not help.   C diff PCR + .    ID consulted for further recommendations.      REVIEW OF SYSTEMS  [  ] ROS unobtainable because:    [  ] All other systems negative except as noted below    Constitutional:  [ ] fever [ ] chills  [ ] weight loss  [ ]night sweat  [ ]poor appetite/PO intake [ ]fatigue   Skin:  [ ] rash [ ] phlebitis	  Eyes: [ ] icterus [ ] pain  [ ] discharge	  ENMT: [ ] sore throat  [ ] thrush [ ] ulcers [ ] exudates [ ]anosmia  Respiratory: [ ] dyspnea [ ] hemoptysis [ ] cough [ ] sputum	  Cardiovascular:  [ ] chest pain [ ] palpitations [ ] edema	  Gastrointestinal:  [ ] nausea [ ] vomiting [ ] diarrhea [ ] constipation [ ] pain	  Genitourinary:  [ ] dysuria [ ] frequency [ ] hematuria [ ] discharge [ ] flank pain  [ ] incontinence  Musculoskeletal:  [ ] myalgias [ ] arthralgias [ ] arthritis  [ ] back pain  Neurological:  [ ] headache [ ] weakness [ ] seizures  [ ] confusion/altered mental status    prior hospital charts reviewed [V]  primary team notes reviewed [V]  other consultant notes reviewed [V]    PAST MEDICAL & SURGICAL HISTORY:  ETOH abuse    S/P left knee surgery    SOCIAL HISTORY:  - Denied smoking/vaping/alcohol/recreational drug use    FAMILY HISTORY:  FHx: myocardial infarction (Father)    Allergies  cortisone (Swelling; Rash)    ANTIMICROBIALS:    ANTIMICROBIALS (past 90 days):  MEDICATIONS  (STANDING):    vancomycin    Solution   125 milliGRAM(s) Oral (07-20-23 @ 13:19)    OTHER MEDS:   MEDICATIONS  (STANDING):  acetaminophen     Tablet .. 650 every 6 hours PRN  cholestyramine Powder (Sugar-Free) 4 daily  furosemide    Tablet 40 daily  heparin   Injectable 5000 every 8 hours  lactulose Syrup 20 two times a day  polyethylene glycol 3350 17 daily  prednisoLONE    3 mG/mL Solution (ORAPRED) 40 daily  spironolactone 100 daily  ursodiol Tablet 500 every 12 hours    VITALS:  Vital Signs Last 24 Hrs  T(F): 97.8 (07-23-23 @ 11:20), Max: 98.8 (07-19-23 @ 01:32)    Vital Signs Last 24 Hrs  HR: 80 (07-23-23 @ 11:20) (78 - 94)  BP: 128/77 (07-23-23 @ 11:20) (114/56 - 148/89)  RR: 19 (07-23-23 @ 11:20)  SpO2: 98% (07-23-23 @ 11:20) (96% - 100%)  Wt(kg): --    EXAM:  Physical Exam:  Constitutional:  well preserved, comfortable  Head/Eyes: no icterus, PERRL, EOMI  ENT:  supple; no thrush  LUNGS:  CTA  CVS:  normal S1, S2, no murmur  Abd:  soft, non-tender; non-distended  Ext:  no edema  Vascular:  IV site no erythema tenderness or discharge  MSK:  joints without swelling  Neuro: AAO X 3, non- focal    Labs:                        11.4   9.86  )-----------( 59       ( 23 Jul 2023 05:50 )             33.8     07-23    131<L>  |  100  |  11  ----------------------------<  104<H>  3.5   |  24  |  0.69    Ca    8.1<L>      23 Jul 2023 05:50  Phos  2.6     07-23  Mg     1.90     07-23    TPro  6.6  /  Alb  2.9<L>  /  TBili  4.1<H>  /  DBili  x   /  AST  42<H>  /  ALT  15  /  AlkPhos  235<H>  07-23    WBC Trend:  WBC Count: 9.86 (07-23-23 @ 05:50)  WBC Count: 7.58 (07-22-23 @ 10:35)  WBC Count: 6.54 (07-21-23 @ 05:50)  WBC Count: 7.05 (07-20-23 @ 04:25)    Auto Neutrophil #: 5.98 K/uL (07-19-23 @ 07:44)  Auto Neutrophil #: 7.45 K/uL (07-18-23 @ 14:15)    Creatine Trend:  Creatinine: 0.69 (07-23)  Creatinine: 0.62 (07-22)  Creatinine: 0.59 (07-21)  Creatinine: 0.70 (07-20)    Liver Biochemical Testing Trend:  Alanine Aminotransferase (ALT/SGPT): 15 (07-23)  Alanine Aminotransferase (ALT/SGPT): 16 (07-22)  Alanine Aminotransferase (ALT/SGPT): 13 (07-21)  Alanine Aminotransferase (ALT/SGPT): 17 (07-20)  Alanine Aminotransferase (ALT/SGPT): 16 (07-19)  Aspartate Aminotransferase (AST/SGOT): 42 (07-23-23 @ 05:50)  Aspartate Aminotransferase (AST/SGOT): 53 (07-22-23 @ 10:35)  Aspartate Aminotransferase (AST/SGOT): 55 (07-21-23 @ 05:50)  Aspartate Aminotransferase (AST/SGOT): 80 (07-20-23 @ 04:25)  Aspartate Aminotransferase (AST/SGOT): 83 (07-19-23 @ 07:44)  Bilirubin Total: 4.1 (07-23)  Bilirubin Total: 6.2 (07-22)  Bilirubin Total: 5.5 (07-21)  Bilirubin Total: 6.1 (07-20)  Bilirubin Total: 9.0 (07-19)    Trend LDH  07-19-23 @ 07:44  290<H>    Urinalysis Basic - ( 23 Jul 2023 05:50 )    Color: x / Appearance: x / SG: x / pH: x  Gluc: 104 mg/dL / Ketone: x  / Bili: x / Urobili: x   Blood: x / Protein: x / Nitrite: x   Leuk Esterase: x / RBC: x / WBC x   Sq Epi: x / Non Sq Epi: x / Bacteria: x    MICROBIOLOGY:    Culture - Body Fluid with Gram Stain (collected 20 Jul 2023 15:55)  Source: Peritoneal Peritoneal Fluid  Preliminary Report:    No growth    Culture - Blood (collected 20 Jul 2023 15:15)  Source: .Blood Blood-Peripheral  Preliminary Report:    No growth at 48 Hours    Culture - Blood (collected 20 Jul 2023 15:15)  Source: .Blood Blood-Venous  Preliminary Report:    No growth at 48 Hours    Culture - Urine (collected 20 Jul 2023 13:50)  Source: Clean Catch Clean Catch (Midstream)  Final Report:    <10,000 CFU/mL Normal Urogenital Erica    Culture - Stool (collected 19 Jul 2023 11:06)  Source: .Stool Feces  Final Report:    No enteric pathogens isolated.    (Stool culture examined for Salmonella,    Shigella, Campylobacter, Aeromonas, Plesiomonas,    Vibrio, E.coli O157 and Yersinia)    HIV-1/2 Combo Result: Nonreact (07-21-23 @ 05:50)    C Diff by PCR Result: Detected (07-19 @ 09:30)    Ferritin: 1062 (07-20)    D-Dimer Assay, Quantitative: 2267 (07-19)    Lactate Dehydrogenase, Serum: 290 (07-19)    A1C with Estimated Average Glucose Result: 4.6 % (07-19-23 @ 07:44)    RADIOLOGY:  imaging below personally reviewed    < from: Xray Chest 1 View AP/PA (07.18.23 @ 15:30) >  IMPRESSION:  Clear lungs.      --- End of Report ---    < end of copied text >  < from: US Abdomen Limited (07.19.23 @ 08:47) >  IMPRESSION:  Large volume ascites in the bilateral lower and right upper quadrant,   largest pocket in the right lower quadrant.    --- End of Report ---    < end of copied text >  < from: US Duplex Venous Lower Ext Complete, Bilateral (07.19.23 @ 10:48) >  IMPRESSION:  No evidence of deep venous thrombosis in either lower extremity.  Left Baker's cyst cyst.    --- Endof Report ---    < end of copied text >  < from: US Abdomen Doppler (07.20.23 @ 09:23) >    IMPRESSION:  Limited evaluation of the hepatic vasculature due to large volume   ascites. Main and left portal veins are patent. Right portalvein,   hepatic veins, and right hepatic artery not visualized.  Cirrhosis.        --- End of Report ---    < end of copied text >  < from: CT Angio Abdomen w/ Oral Cont and w/ IV Cont (07.21.23 @ 17:17) >  IMPRESSION:  Cirrhosis and portal hypertension with moderate abdominopelvic ascites.    No portal thrombosis. Portal and hepatic veins are patent.    --- End of Report ---      < end of copied text >             HPI:    57 year old Male with PMHx of alcohol use disorder presented on 7/18 with increasing abdominal distention and b/l LE edema since last Saturday. For last 1.5 wks, he has also been taking 'gulps' of NyQuil (acetaminophen 1000, benadryl 50) for sleep only once daily or every other day. Pt has not seen a doctor since before COVID because it was difficult to get an appointment. Pt stated that since the swelling started, his bowel movements were getting more loose, and his kids noticed his eyes were yellow. He also feels SOB at rest and with exertion. Lying at angle makes the SOB more tolerable.  Denied abdominal pain, CP, nausea or vomiting. He stated he has not eaten since last night because he did not want to make the swelling worse. This morning, pt started having multiple episodes of watery nonbloody diarrhea, at least 5 before coming to the ED. Pt also with rashes below the knees which started a few months ago. He tried creams which did not help.   C diff PCR + .    ID consulted for further recommendations.      REVIEW OF SYSTEMS  [  ] ROS unobtainable because:    [X] All other systems negative except as noted below    Constitutional:  [ ] fever [ ] chills  [ ] weight loss  [ ]night sweat  [ ]poor appetite/PO intake [ ]fatigue   Skin:  [ ] rash [ ] phlebitis	  Eyes: [ ] icterus [ ] pain  [ ] discharge	  ENMT: [ ] sore throat  [ ] thrush [ ] ulcers [ ] exudates [ ]anosmia  Respiratory: [ ] dyspnea [ ] hemoptysis [ ] cough [ ] sputum	  Cardiovascular:  [ ] chest pain [ ] palpitations [ ] edema	  Gastrointestinal:  [ ] nausea [ ] vomiting [ ] diarrhea [ ] constipation [ ] pain [x] distension 	  Genitourinary:  [ ] dysuria [ ] frequency [ ] hematuria [ ] discharge [ ] flank pain  [ ] incontinence  Musculoskeletal:  [ ] myalgias [ ] arthralgias [ ] arthritis  [ ] back pain  Neurological:  [ ] headache [ ] weakness [ ] seizures  [ ] confusion/altered mental status    prior hospital charts reviewed [V]  primary team notes reviewed [V]  other consultant notes reviewed [V]    PAST MEDICAL & SURGICAL HISTORY:  ETOH abuse    S/P left knee surgery    SOCIAL HISTORY:  - Denied smoking/vaping/alcohol/recreational drug use    FAMILY HISTORY:  FHx: myocardial infarction (Father)    Allergies  cortisone (Swelling; Rash)    ANTIMICROBIALS:    ANTIMICROBIALS (past 90 days):  MEDICATIONS  (STANDING):    vancomycin    Solution   125 milliGRAM(s) Oral (07-20-23 @ 13:19)    OTHER MEDS:   MEDICATIONS  (STANDING):  acetaminophen     Tablet .. 650 every 6 hours PRN  cholestyramine Powder (Sugar-Free) 4 daily  furosemide    Tablet 40 daily  heparin   Injectable 5000 every 8 hours  lactulose Syrup 20 two times a day  polyethylene glycol 3350 17 daily  prednisoLONE    3 mG/mL Solution (ORAPRED) 40 daily  spironolactone 100 daily  ursodiol Tablet 500 every 12 hours    VITALS:  Vital Signs Last 24 Hrs  T(F): 97.8 (07-23-23 @ 11:20), Max: 98.8 (07-19-23 @ 01:32)    Vital Signs Last 24 Hrs  HR: 80 (07-23-23 @ 11:20) (78 - 94)  BP: 128/77 (07-23-23 @ 11:20) (114/56 - 148/89)  RR: 19 (07-23-23 @ 11:20)  SpO2: 98% (07-23-23 @ 11:20) (96% - 100%)  Wt(kg): --    EXAM:  Physical Exam:  Constitutional:  well preserved, comfortable  Head/Eyes: no icterus, PERRL, EOMI  ENT:  supple; no thrush  LUNGS:  CTA  CVS:  normal S1, S2, no murmur  Abd:  soft, non-tender; distended  Ext:  bilateral lower extremities swelling, scratch marks   Vascular:  IV site no erythema tenderness or discharge  MSK:  joints without swelling  Neuro: AAO X 3, non- focal    Labs:                        11.4   9.86  )-----------( 59       ( 23 Jul 2023 05:50 )             33.8     07-23    131<L>  |  100  |  11  ----------------------------<  104<H>  3.5   |  24  |  0.69    Ca    8.1<L>      23 Jul 2023 05:50  Phos  2.6     07-23  Mg     1.90     07-23    TPro  6.6  /  Alb  2.9<L>  /  TBili  4.1<H>  /  DBili  x   /  AST  42<H>  /  ALT  15  /  AlkPhos  235<H>  07-23    WBC Trend:  WBC Count: 9.86 (07-23-23 @ 05:50)  WBC Count: 7.58 (07-22-23 @ 10:35)  WBC Count: 6.54 (07-21-23 @ 05:50)  WBC Count: 7.05 (07-20-23 @ 04:25)    Auto Neutrophil #: 5.98 K/uL (07-19-23 @ 07:44)  Auto Neutrophil #: 7.45 K/uL (07-18-23 @ 14:15)    Creatine Trend:  Creatinine: 0.69 (07-23)  Creatinine: 0.62 (07-22)  Creatinine: 0.59 (07-21)  Creatinine: 0.70 (07-20)    Liver Biochemical Testing Trend:  Alanine Aminotransferase (ALT/SGPT): 15 (07-23)  Alanine Aminotransferase (ALT/SGPT): 16 (07-22)  Alanine Aminotransferase (ALT/SGPT): 13 (07-21)  Alanine Aminotransferase (ALT/SGPT): 17 (07-20)  Alanine Aminotransferase (ALT/SGPT): 16 (07-19)  Aspartate Aminotransferase (AST/SGOT): 42 (07-23-23 @ 05:50)  Aspartate Aminotransferase (AST/SGOT): 53 (07-22-23 @ 10:35)  Aspartate Aminotransferase (AST/SGOT): 55 (07-21-23 @ 05:50)  Aspartate Aminotransferase (AST/SGOT): 80 (07-20-23 @ 04:25)  Aspartate Aminotransferase (AST/SGOT): 83 (07-19-23 @ 07:44)  Bilirubin Total: 4.1 (07-23)  Bilirubin Total: 6.2 (07-22)  Bilirubin Total: 5.5 (07-21)  Bilirubin Total: 6.1 (07-20)  Bilirubin Total: 9.0 (07-19)    Trend LDH  07-19-23 @ 07:44  290<H>    Urinalysis Basic - ( 23 Jul 2023 05:50 )    Color: x / Appearance: x / SG: x / pH: x  Gluc: 104 mg/dL / Ketone: x  / Bili: x / Urobili: x   Blood: x / Protein: x / Nitrite: x   Leuk Esterase: x / RBC: x / WBC x   Sq Epi: x / Non Sq Epi: x / Bacteria: x    MICROBIOLOGY:    Culture - Body Fluid with Gram Stain (collected 20 Jul 2023 15:55)  Source: Peritoneal Peritoneal Fluid  Preliminary Report:    No growth    Culture - Blood (collected 20 Jul 2023 15:15)  Source: .Blood Blood-Peripheral  Preliminary Report:    No growth at 48 Hours    Culture - Blood (collected 20 Jul 2023 15:15)  Source: .Blood Blood-Venous  Preliminary Report:    No growth at 48 Hours    Culture - Urine (collected 20 Jul 2023 13:50)  Source: Clean Catch Clean Catch (Midstream)  Final Report:    <10,000 CFU/mL Normal Urogenital Erica    Culture - Stool (collected 19 Jul 2023 11:06)  Source: .Stool Feces  Final Report:    No enteric pathogens isolated.    (Stool culture examined for Salmonella,    Shigella, Campylobacter, Aeromonas, Plesiomonas,    Vibrio, E.coli O157 and Yersinia)    HIV-1/2 Combo Result: Nonreact (07-21-23 @ 05:50)    C Diff by PCR Result: Detected (07-19 @ 09:30)    Ferritin: 1062 (07-20)    D-Dimer Assay, Quantitative: 2267 (07-19)    Lactate Dehydrogenase, Serum: 290 (07-19)    A1C with Estimated Average Glucose Result: 4.6 % (07-19-23 @ 07:44)    RADIOLOGY:  imaging below personally reviewed    < from: Xray Chest 1 View AP/PA (07.18.23 @ 15:30) >  IMPRESSION:  Clear lungs.      --- End of Report ---    < end of copied text >  < from: US Abdomen Limited (07.19.23 @ 08:47) >  IMPRESSION:  Large volume ascites in the bilateral lower and right upper quadrant,   largest pocket in the right lower quadrant.    --- End of Report ---    < end of copied text >  < from: US Duplex Venous Lower Ext Complete, Bilateral (07.19.23 @ 10:48) >  IMPRESSION:  No evidence of deep venous thrombosis in either lower extremity.  Left Baker's cyst cyst.    --- Endof Report ---    < end of copied text >  < from: US Abdomen Doppler (07.20.23 @ 09:23) >    IMPRESSION:  Limited evaluation of the hepatic vasculature due to large volume   ascites. Main and left portal veins are patent. Right portalvein,   hepatic veins, and right hepatic artery not visualized.  Cirrhosis.        --- End of Report ---    < end of copied text >  < from: CT Angio Abdomen w/ Oral Cont and w/ IV Cont (07.21.23 @ 17:17) >  IMPRESSION:  Cirrhosis and portal hypertension with moderate abdominopelvic ascites.    No portal thrombosis. Portal and hepatic veins are patent.    --- End of Report ---      < end of copied text >

## 2023-07-23 NOTE — PROGRESS NOTE ADULT - PROBLEM SELECTOR PLAN 1
Pt with AUD p/w 1.5 weeks of increasing abdominal distention and b/l LE edema. Concerning for ascites in setting of possible liver failure from alcohol use vs. Budd Chiari syndrome, less likely malignant ascites or from acute decompensated heart failure. Ascites and cirrhosis confirmed on US. s/p paracentesis with 16L removed. Patient feeling much better after having fluid removed. Peritoneal fluid showing 157 nucleated cell count r/o SBP. Fluid Tprotein 2.4, LDH 83, glucose 115, alb 1.0. Fluid gram stain w/ PML, no organisms. SAAG 1.8 - in setting of unremarkable TTE, ascites 2/2 portal hypertension, likely alcoholic cirrhosis.   -GI/hepatology consulted appreciated recs  -s/p IV albumin 25%   -f/u CT angio abd w/ oral and IV contrast  -serial abdominal exams  -c/w Lasix 40mg PO, spironolactone 100mg PO  -c/w Miralax daily  -Will start Lactulose 20g BID for HE prevention, goal 3 BM per day, uptitrate as needed  -EGD/transplant screen as outpatient  -CT abd/pelvis w/ nodular liver. No evidence of PVT/Budd chiarri synd Pt with AUD p/w 1.5 weeks of increasing abdominal distention and b/l LE edema. Ascites and cirrhosis confirmed on US. s/p paracentesis with 16L removed. Patient feeling much better after having fluid removed. Peritoneal fluid showing 157 nucleated cell count r/o SBP.  Fluid gram stain w/ PML, no organisms. SAAG 1.8 - in setting of unremarkable TTE, ascites 2/2 portal hypertension, likely alcoholic cirrhosis. CT abd/pelvis w/ nodular liver. No evidence of PVT/Budd Chiari syndrome. MELD and MDF remain elevated despite steroids.  -GI/hepatology consulted appreciated recs  -s/p IV albumin 25%   -serial abdominal exams  -c/w Lasix 40mg PO, spironolactone 100mg PO  -c/w Miralax daily  -Will start Lactulose 20g BID for HE prevention, goal 3 BM per day, uptitrate as needed  -EGD/transplant screen as outpatient Pt with AUD p/w 1.5 weeks of increasing abdominal distention and b/l LE edema. Ascites and cirrhosis confirmed on US. s/p paracentesis with 16L removed. Patient feeling much better after having fluid removed. Peritoneal fluid showing 157 nucleated cell count r/o SBP.  Fluid gram stain w/ PML, no organisms. SAAG 1.8 - in setting of unremarkable TTE, ascites 2/2 portal hypertension, likely alcoholic cirrhosis. CT abd/pelvis w/ nodular liver. No evidence of PVT/Budd Chiari syndrome. MELD 22 and MDF  despite steroids.  -GI/hepatology consulted appreciated recs  -s/p IV albumin 25%   -serial abdominal exams  -c/w Lasix 40mg PO, spironolactone 100mg PO  -c/w Miralax daily  -On Lactulose 10g BID will increase to 20g BID, goal 3 BM per day, uptitrate as needed  -EGD/transplant screen as outpatient Pt with AUD p/w 1.5 weeks of increasing abdominal distention and b/l LE edema. Ascites and cirrhosis confirmed on US. s/p paracentesis with 16L removed. Patient feeling much better after having fluid removed. Peritoneal fluid showing 157 nucleated cell count r/o SBP.  Fluid gram stain w/ PML, no organisms. SAAG 1.8 - in setting of unremarkable TTE, ascites 2/2 portal hypertension, likely alcoholic cirrhosis. CT abd/pelvis w/ nodular liver. No evidence of PVT/Budd Chiari syndrome. MELD 22, elevated MDF despite steroids.  -GI/hepatology consulted appreciated recs  -s/p IV albumin 25%   -serial abdominal exams  -c/w Lasix 40mg PO, spironolactone 100mg PO  -c/w Miralax daily  -On Lactulose 10g BID will increase to 20g BID, goal 3 BM per day, uptitrate as needed  -EGD/transplant screen as outpatient

## 2023-07-23 NOTE — PROGRESS NOTE ADULT - PROBLEM SELECTOR PLAN 2
Pt with elevated t bili to 9.0, d bili 4.5, alk phos 388, AST 91, and ALT 18. Elevated GGT suggestive of cholestatic disease. Concerning for cholestasis/biliary obstruction with component of hepatocellular injury/hepatitis 2/2 alcohol use. Ceruloplasmin 2.4.   -[facility] GI/Hepatology recs  -trend LFTs  -s/p loading dose NAC 150mg/kg/day and maintenance dose 100mg/kg/day   -c/w ursodiol 500mg BID  -f/u ASMA, MARTA, IGG  -f/u bcx, HIV, Hep A  -f/u CT angio abd w/ oral and IV contrast Pt with elevated t bili to 9.0, d bili 4.5, alk phos 388, AST 91, and ALT 18. Elevated GGT suggestive of cholestatic disease. Concerning for cholestasis/biliary obstruction with component of hepatocellular injury/hepatitis 2/2 alcohol use. As of 7/23, LFTs downtrending, downtrending bili of 4.1.  -appreciate GI/Hepatology recs  -trend LFTs  -s/p loading dose NAC 150mg/kg/day. c/w maintenance dose 100mg/kg/day until 7/24  -c/w ursodiol 500mg BID  -c/w prednisolone 40mg daily  -f/u bcx, NGTD  -Hep A immunization as outpatient

## 2023-07-23 NOTE — PROGRESS NOTE ADULT - PROBLEM SELECTOR PLAN 11
as of 7/21, mag 1.3, phos 2.5, potassium 3.5. Likely in setting of chronic alcohol use.   -Goal Mag 2.0, Phos 3.0, Potassium 4.0  -will replete as needed as of 7/23, mag 1.9, phos 2.6, potassium 3.5. Likely in setting of chronic alcohol use.   -Goal Mag 2.0, Phos 3.0, Potassium 4.0  -will replete as needed

## 2023-07-23 NOTE — CONSULT NOTE ADULT - ASSESSMENT
57 year old Male with PMHx of alcohol use disorder presented on 7/18 with increasing abdominal distention and b/l LE edema.    Afebrile  No leukocytosis    Workup:  GI PCR neg   C diff PCR +   Stool Cx neg     US abdomen: Large volume ascites in the bilateral lower and right upper quadrant, largest pocket in the right lower quadrant.  CT AP w/ cont (7/21): Cirrhosis and portal hypertension with moderate abdominopelvic ascites. No portal thrombosis. Portal and hepatic veins are patent    Paracentesis on 7/20: , 0% neutrophils  Peritoneal Cx (7/20): NGTD  Blood Cx (7/20):NGTD  Urine Cx (7/20): NG    Antimicrobials:  Vancomycin po 7/20    #Cirrhosis with portal hypertension, ascites, LE edema   #Diarrhea - resolved, now on lactulose   #C diff PCR +    Recommendations:      PT TO BE SEEN. PRELIM NOTE  PENDING RECS. PLEASE WAIT FOR FINAL RECS AFTER DISCUSSION WITH ATTENDING#       57 year old Male with PMHx of alcohol use disorder presented on 7/18 with increasing abdominal distention and b/l LE edema.    Afebrile  No leukocytosis    Workup:  GI PCR neg   C diff PCR +   Stool Cx neg     US abdomen: Large volume ascites in the bilateral lower and right upper quadrant, largest pocket in the right lower quadrant.  CT AP w/ cont (7/21): Cirrhosis and portal hypertension with moderate abdominopelvic ascites. No portal thrombosis. Portal and hepatic veins are patent    Paracentesis on 7/20: , 0% neutrophils  Peritoneal Cx (7/20): NGTD  Blood Cx (7/20): NGTD  Urine Cx (7/20): NG    Antimicrobials:  Vancomycin po 7/20    #Cirrhosis with portal hypertension, ascites, LE edema   #Diarrhea - resolved, now on lactulose   #C diff PCR +, currently asymptomatic likely colonization, low concern for C diff infection    Recommendations:  -Would monitor off antibiotics  -Currently on laxatives with no BM  -Rest of management per primary team     Discussed with Dr Kristal May MD, PGY5  ID fellow  [employer] Teams Preferred  After 5pm/weekends call [phone]

## 2023-07-23 NOTE — PROGRESS NOTE ADULT - PROBLEM SELECTOR PLAN 5
Pt w/ recent diarrhea, no N/V, likely in setting alcohol use and liver dysfunction. no recent abx use. Patient not having diarrhea on admission. C. diff positive however low suspicion for active infection at this time. GI pcr negative  -f/u stool culture NGTD  -monitor stool count  -on isolation precautions, can d/c 7/21 if no diarrhea

## 2023-07-23 NOTE — CONSULT NOTE ADULT - ATTENDING COMMENTS
57-year-old male with a past medical history of alcohol use disorder who was admitted to the hospital due to worsening abdominal distention bilateral lower extremity edema since Saturday.    Patient reports that for the past week and a half prior to admission has been constantly using NyQuil to sleep.  Patient reports that since the swelling started, he has developed more loose bowel movements and noted that his eyes became yellow.  Patient reports that sitting up helps his shortness of breath.  Patient reports that prior to admission multiple watery nonbloody bowel movements.    Upon admission, further work-up revealed C. difficile positive on PCR.  Patient denied any further diarrhea, denied any previous antibiotic use.    #Diarrhea, resolved, on lactulose  #C. difficile PCR positive  #Liver cirrhosis with ascites and lower extremity edema    Recommendations  Would monitor off antibiotics at this time  Diarrhea has resolved and is also on laxatives  Monitor stool output  Follow fever curve and WBC count    Killian Giraldo MD  Division of Infectious Diseases 57-year-old male with a past medical history of alcohol use disorder who was admitted to the hospital due to worsening abdominal distention bilateral lower extremity edema since Saturday.    Patient reports that for the past week and a half prior to admission has been constantly using NyQuil to sleep.  Patient reports that since the swelling started, he has developed more loose bowel movements and noted that his eyes became yellow.  Patient reports that sitting up helps his shortness of breath.  Patient reports that prior to admission multiple watery nonbloody bowel movements.    Upon admission, further work-up revealed C. difficile positive on PCR.  Patient denied any further diarrhea, denied any previous antibiotic use.    #Diarrhea, resolved, on lactulose  #C. difficile PCR positive  #Liver cirrhosis with ascites and lower extremity edema    Recommendations  Would monitor off antibiotics at this time  Diarrhea has resolved and is also on laxatives  Monitor stool output  Follow fever curve and WBC count    ID to sign off. Please contact as issues arise.    Killian Giraldo MD  Division of Infectious Diseases

## 2023-07-23 NOTE — PROGRESS NOTE ADULT - PROBLEM SELECTOR PLAN 9
Lipid panel with .   -c/w 4g cholestyramine daily sodium 135 > 133 > 131 (7/23). likely in setting of diuresis, could also have underlying SIADH.  -f/u urine lytes  -monitor sodium

## 2023-07-23 NOTE — PROGRESS NOTE ADULT - SUBJECTIVE AND OBJECTIVE BOX
Emerson Ocasio MD  PGY 1 Department of Internal Medicine        Patient is a 57y old  Male who presents with a chief complaint of Abdominal distention (23 Jul 2023 07:02)      SUBJECTIVE / OVERNIGHT EVENTS: Pt seen and examined. No acute overnight events. Had 1 BM overnight and is passing significant flatus. Denies fevers, chills, CP, SOB, Abdominal pain, N/V, Constipation, Diarrhea        MEDICATIONS  (STANDING):  acetylcysteine IVPB 10 Gram(s) IV Intermittent once  cholestyramine Powder (Sugar-Free) 4 Gram(s) Oral daily  furosemide    Tablet 40 milliGRAM(s) Oral daily  heparin   Injectable 5000 Unit(s) SubCutaneous every 8 hours  lactulose Syrup 10 Gram(s) Oral two times a day  polyethylene glycol 3350 17 Gram(s) Oral daily  potassium phosphate / sodium phosphate Powder (PHOS-NaK) 1 Packet(s) Oral once  prednisoLONE    3 mG/mL Solution (ORAPRED) 40 milliGRAM(s) Oral daily  spironolactone 100 milliGRAM(s) Oral daily  ursodiol Tablet 500 milliGRAM(s) Oral every 12 hours    MEDICATIONS  (PRN):  acetaminophen     Tablet .. 650 milliGRAM(s) Oral every 6 hours PRN Temp greater or equal to 38C (100.4F), Mild Pain (1 - 3)  calamine/zinc oxide Lotion 1 Application(s) Topical daily PRN Rash and/or Itching      I&O's Summary      Vital Signs Last 24 Hrs  T(C): 36.8 (23 Jul 2023 05:17), Max: 36.8 (22 Jul 2023 14:49)  T(F): 98.2 (23 Jul 2023 05:17), Max: 98.2 (22 Jul 2023 14:49)  HR: 78 (23 Jul 2023 05:17) (78 - 94)  BP: 114/56 (23 Jul 2023 05:17) (114/56 - 148/89)  BP(mean): --  RR: 17 (23 Jul 2023 05:17) (17 - 19)  SpO2: 98% (23 Jul 2023 05:17) (96% - 100%)    Parameters below as of 23 Jul 2023 05:17  Patient On (Oxygen Delivery Method): room air        CAPILLARY BLOOD GLUCOSE          PHYSICAL EXAM:  GENERAL: NAD,  resting comfortably  HEAD:  Atraumatic, Normocephalic  EYES: EOMI, PERRL, conjunctiva nl, scleral icterus bilaterally  NECK: No JVD  CHEST/LUNG: Clear to auscultation bilaterally; No wheeze  HEART: Regular rate and rhythm; No murmurs, rubs, or gallops  ABDOMEN: Soft, Nontender, Protuberant abdomen/distended. Reducible umbilical hernia; Bowel sounds present.   EXTREMITIES:  2+ Peripheral Pulses, 2+ pitting edema to lower extremities, No clubbing, cyanosis. Calves are non tender and without erythema or warmth. Left calf mildly firm  PSYCH: AAOx3  NEUROLOGY: non-focal, moving all extremities, cranial nerves intact  SKIN: Multiple areas of excoriations to extremities with scabbing   lesions       LABS:                        11.4   9.86  )-----------( 59       ( 23 Jul 2023 05:50 )             33.8     Auto Eosinophil # x     / Auto Eosinophil % x     / Auto Neutrophil # x     / Auto Neutrophil % x     / BANDS % x                            12.1   7.58  )-----------( 55       ( 22 Jul 2023 10:35 )             35.8     Auto Eosinophil # x     / Auto Eosinophil % x     / Auto Neutrophil # x     / Auto Neutrophil % x     / BANDS % x        07-23    131<L>  |  100  |  11  ----------------------------<  104<H>  3.5   |  24  |  0.69  07-22    133<L>  |  96<L>  |  8   ----------------------------<  212<H>  4.1   |  25  |  0.62    Ca    8.1<L>      23 Jul 2023 05:50  Mg     1.90     07-23  Phos  2.6     07-23  TPro  6.6  /  Alb  2.9<L>  /  TBili  4.1<H>  /  DBili  x   /  AST  42<H>  /  ALT  15  /  AlkPhos  235<H>  07-23  TPro  7.6  /  Alb  3.5  /  TBili  6.2<H>  /  DBili  x   /  AST  53<H>  /  ALT  16  /  AlkPhos  269<H>  07-22    PT/INR - ( 23 Jul 2023 05:50 )   PT: 19.9 sec;   INR: 1.71 ratio         PTT - ( 23 Jul 2023 05:50 )  PTT:40.6 sec      Urinalysis Basic - ( 23 Jul 2023 05:50 )    Color: x / Appearance: x / SG: x / pH: x  Gluc: 104 mg/dL / Ketone: x  / Bili: x / Urobili: x   Blood: x / Protein: x / Nitrite: x   Leuk Esterase: x / RBC: x / WBC x   Sq Epi: x / Non Sq Epi: x / Bacteria: x      Lactate, Blood: 1.4 mmol/L (07-20 @ 04:25)        RADIOLOGY & ADDITIONAL TESTS:    Imaging Personally Reviewed:    Consultant(s) Notes Reviewed:      Care Discussed with Consultants/Other Providers:

## 2023-07-24 LAB
ALBUMIN SERPL ELPH-MCNC: 2.7 G/DL — LOW (ref 3.3–5)
ALP SERPL-CCNC: 228 U/L — HIGH (ref 40–120)
ALT FLD-CCNC: 18 U/L — SIGNIFICANT CHANGE UP (ref 4–41)
ANION GAP SERPL CALC-SCNC: 12 MMOL/L — SIGNIFICANT CHANGE UP (ref 7–14)
APTT BLD: 41.1 SEC — HIGH (ref 27–36.3)
AST SERPL-CCNC: 46 U/L — HIGH (ref 4–40)
BILIRUB SERPL-MCNC: 3.6 MG/DL — HIGH (ref 0.2–1.2)
BUN SERPL-MCNC: 12 MG/DL — SIGNIFICANT CHANGE UP (ref 7–23)
CALCIUM SERPL-MCNC: 8.5 MG/DL — SIGNIFICANT CHANGE UP (ref 8.4–10.5)
CHLORIDE SERPL-SCNC: 98 MMOL/L — SIGNIFICANT CHANGE UP (ref 98–107)
CO2 SERPL-SCNC: 24 MMOL/L — SIGNIFICANT CHANGE UP (ref 22–31)
CREAT SERPL-MCNC: 0.65 MG/DL — SIGNIFICANT CHANGE UP (ref 0.5–1.3)
EGFR: 111 ML/MIN/1.73M2 — SIGNIFICANT CHANGE UP
GLUCOSE SERPL-MCNC: 122 MG/DL — HIGH (ref 70–99)
HCT VFR BLD CALC: 34.9 % — LOW (ref 39–50)
HGB BLD-MCNC: 11.7 G/DL — LOW (ref 13–17)
INR BLD: 1.6 RATIO — HIGH (ref 0.88–1.16)
MAGNESIUM SERPL-MCNC: 1.6 MG/DL — SIGNIFICANT CHANGE UP (ref 1.6–2.6)
MCHC RBC-ENTMCNC: 33.5 GM/DL — SIGNIFICANT CHANGE UP (ref 32–36)
MCHC RBC-ENTMCNC: 35.1 PG — HIGH (ref 27–34)
MCV RBC AUTO: 104.8 FL — HIGH (ref 80–100)
NON-GYNECOLOGICAL CYTOLOGY STUDY: SIGNIFICANT CHANGE UP
NRBC # BLD: 0 /100 WBCS — SIGNIFICANT CHANGE UP (ref 0–0)
NRBC # FLD: 0 K/UL — SIGNIFICANT CHANGE UP (ref 0–0)
PHOSPHATE SERPL-MCNC: 3.1 MG/DL — SIGNIFICANT CHANGE UP (ref 2.5–4.5)
PLATELET # BLD AUTO: 62 K/UL — LOW (ref 150–400)
POTASSIUM SERPL-MCNC: 3.6 MMOL/L — SIGNIFICANT CHANGE UP (ref 3.5–5.3)
POTASSIUM SERPL-SCNC: 3.6 MMOL/L — SIGNIFICANT CHANGE UP (ref 3.5–5.3)
PROT SERPL-MCNC: 6.1 G/DL — SIGNIFICANT CHANGE UP (ref 6–8.3)
PROTHROM AB SERPL-ACNC: 18.6 SEC — HIGH (ref 10.5–13.4)
RBC # BLD: 3.33 M/UL — LOW (ref 4.2–5.8)
RBC # FLD: 15.3 % — HIGH (ref 10.3–14.5)
SODIUM SERPL-SCNC: 134 MMOL/L — LOW (ref 135–145)
UUN UR-MCNC: 240.1 MG/DL — SIGNIFICANT CHANGE UP
WBC # BLD: 8.27 K/UL — SIGNIFICANT CHANGE UP (ref 3.8–10.5)
WBC # FLD AUTO: 8.27 K/UL — SIGNIFICANT CHANGE UP (ref 3.8–10.5)

## 2023-07-24 PROCEDURE — 99232 SBSQ HOSP IP/OBS MODERATE 35: CPT | Mod: GC

## 2023-07-24 RX ORDER — FUROSEMIDE 40 MG
40 TABLET ORAL ONCE
Refills: 0 | Status: COMPLETED | OUTPATIENT
Start: 2023-07-24 | End: 2023-07-24

## 2023-07-24 RX ORDER — ALBUMIN HUMAN 25 %
100 VIAL (ML) INTRAVENOUS ONCE
Refills: 0 | Status: COMPLETED | OUTPATIENT
Start: 2023-07-24 | End: 2023-07-24

## 2023-07-24 RX ORDER — MAGNESIUM SULFATE 500 MG/ML
1 VIAL (ML) INJECTION ONCE
Refills: 0 | Status: COMPLETED | OUTPATIENT
Start: 2023-07-24 | End: 2023-07-24

## 2023-07-24 RX ADMIN — LACTULOSE 20 GRAM(S): 10 SOLUTION ORAL at 06:31

## 2023-07-24 RX ADMIN — HEPARIN SODIUM 5000 UNIT(S): 5000 INJECTION INTRAVENOUS; SUBCUTANEOUS at 21:58

## 2023-07-24 RX ADMIN — LACTULOSE 20 GRAM(S): 10 SOLUTION ORAL at 17:53

## 2023-07-24 RX ADMIN — Medication 40 MILLIGRAM(S): at 06:31

## 2023-07-24 RX ADMIN — Medication 40 MILLIGRAM(S): at 06:32

## 2023-07-24 RX ADMIN — POLYETHYLENE GLYCOL 3350 17 GRAM(S): 17 POWDER, FOR SOLUTION ORAL at 13:30

## 2023-07-24 RX ADMIN — URSODIOL 500 MILLIGRAM(S): 250 TABLET, FILM COATED ORAL at 17:53

## 2023-07-24 RX ADMIN — Medication 100 GRAM(S): at 08:44

## 2023-07-24 RX ADMIN — CHOLESTYRAMINE 4 GRAM(S): 4 POWDER, FOR SUSPENSION ORAL at 08:44

## 2023-07-24 RX ADMIN — Medication 40 MILLIGRAM(S): at 13:36

## 2023-07-24 RX ADMIN — Medication 50 MILLILITER(S): at 17:52

## 2023-07-24 RX ADMIN — HEPARIN SODIUM 5000 UNIT(S): 5000 INJECTION INTRAVENOUS; SUBCUTANEOUS at 13:31

## 2023-07-24 RX ADMIN — HEPARIN SODIUM 5000 UNIT(S): 5000 INJECTION INTRAVENOUS; SUBCUTANEOUS at 06:32

## 2023-07-24 RX ADMIN — SPIRONOLACTONE 100 MILLIGRAM(S): 25 TABLET, FILM COATED ORAL at 06:32

## 2023-07-24 RX ADMIN — URSODIOL 500 MILLIGRAM(S): 250 TABLET, FILM COATED ORAL at 06:32

## 2023-07-24 NOTE — PROGRESS NOTE ADULT - ATTENDING COMMENTS
- alcoholic hepatitis: continue prednisolone, will calculate Lille score d4 tomorrow  - no pruritus - stop ursodiol and cholestyramine  - no HE - stop lactulose  - tense ascites, 2 plus edema: give another furosemide/spironolactone 40/100 mg  today, give albumin 25% 100mL q8 hrs x 3, then continue 40/100 and LVP tomorrow; obtain daily weights

## 2023-07-24 NOTE — PROGRESS NOTE ADULT - PROBLEM SELECTOR PLAN 11
as of 7/23, mag 1.9, phos 2.6, potassium 3.5. Likely in setting of chronic alcohol use.   -Goal Mag 2.0, Phos 3.0, Potassium 4.0  -will replete as needed as of 7/24, mag 1.6, phos 3.1, potassium 3.6. Likely in setting of chronic alcohol use.   -Goal Mag 2.0, Phos 3.0, Potassium 4.0  -will replete as needed

## 2023-07-24 NOTE — PROGRESS NOTE ADULT - PROBLEM SELECTOR PLAN 5
Pt w/ recent diarrhea, no N/V, likely in setting alcohol use and liver dysfunction. no recent abx use. Patient not having diarrhea on admission. C. diff positive however low suspicion for active infection at this time. GI pcr negative  -f/u stool culture NGTD  -monitor stool count  -on isolation precautions, can d/c 7/21 if no diarrhea Pt w/ recent diarrhea, no N/V, likely in setting alcohol use and liver dysfunction. no recent abx use. Patient not having diarrhea on admission. C. diff positive however low suspicion for active infection at this time. GI pcr negative. ID consulted not recommending abx  -f/u stool culture NGTD  -monitor stool count  -on isolation precautions, can d/c 7/21 if no diarrhea

## 2023-07-24 NOTE — PROGRESS NOTE ADULT - PROBLEM SELECTOR PLAN 7
Plt count 58>44>52>50 > 59 (7/23). Also with elevated PT/PTT, elevated D-dimer, decreased fibrinogen on admission. Suspect coagulopathies from bone marrow suppression in setting of chronic alcohol use and possibly from liver failure. Low suspicion for TTP given lack of fevers, renal failure, and AMS. DIC also considered but unlikely given normal haptoglobin. PT/PTT/INR downtrending as of 7/23.  -monitor plt  -transfuse <10, <20k and febrile, and <50k w/ active bleeding Plt count 58>44>52>50 > 59 > 62 (7/24). Also with elevated PT/PTT, elevated D-dimer, decreased fibrinogen on admission. Suspect coagulopathies from bone marrow suppression in setting of chronic alcohol use and possibly from liver failure. Low suspicion for TTP given lack of fevers, renal failure, and AMS. DIC also considered but unlikely given normal haptoglobin. PT/PTT/INR downtrending as of 7/24  -monitor plt  -transfuse <10, <20k and febrile, and <50k w/ active bleeding

## 2023-07-24 NOTE — PROGRESS NOTE ADULT - ASSESSMENT
56yo w/ PMHx AUD, remote hx DVT (not on AC, ?if provoked) presenting w/ jaundice and ascites in setting heavy alcohol use c/f alcoholic hepatitis and cirrhosis.    #Alcoholic hepatitis: Suspect diagnosis of alcoholic hepatitis given significant ongoing alcohol use, juandice, AST >50, AST/ALT>1.5, total bili >3. Maddrey 27.4--> --> 49--> 27.5 today. Per ID, C. diff likely colonization. Overall clinically improving on steroids (7/21-)  # Cirrhosis -  E/o cirrhosis on CT scan, most likely 2/2 AUD.  Fluid studies c/w portal HTN. No e/o PVT on U/S. TTE WNL. Clinically is volume up w/o asterixis. Will need screening EGD. Adequate diuresis based on Sheila  CT A/P IVC 7/21: Cirrhosis and portal hypertension with moderate abdominopelvic ascites. No portal thrombosis. Portal and hepatic veins are patent.  MELD 20 (7/20)-->23 (7/21)-->20 (7/24), B+  Ascites: s/p 16 L removed 7/20  EV: no prior EGD  HCC: No e/o HCC on CT  HAV IgM/IgG negative, would need immunization  #AUD: Pt feels he can quit easily and not currently interested in pharmacological assistance but may benefit from discussion on rehab/group programs     Recommendations  - c/w prednisolone 40mg daily given MDF>32 and no signs of active infection  -Ursodiol 500mg BID  -Can give HAV vaccine while inpatient if available through pharmacy   -trend CBC, CMP, INR  - etoh cessation  -c/w lasix 40 mg, aldactone 100mg for diuresis  -Clinical monitoring for HE  -Will need screening EGD- will discuss inpatient vs. outpt  -Ongoing eval for OLT- likely to be continued as an outpt     Hepatology will continue to follow.     All recommendations are tentative until note is attested by attending.     Monica Hale  GI/Hepatology Fellow PGY5    NON-URGENT CONSULTS:  Please email [email or web address]  AT NIGHT AND ON WEEKENDS:  Available on Microsoft Teams  [phone] (Long Range Pager)    After 5pm, please contact the on-call GI fellow. [phone] 56yo w/ PMHx AUD, remote hx DVT (not on AC, ?if provoked) presenting w/ jaundice and ascites in setting heavy alcohol use c/f alcoholic hepatitis and cirrhosis.    #Alcoholic hepatitis: Suspect diagnosis of alcoholic hepatitis given significant ongoing alcohol use, juandice, AST >50, AST/ALT>1.5, total bili >3. Maddrey 27.4--> --> 49--> 27.5 today. Per ID, C. diff likely colonization. Overall clinically improving on steroids (7/21-)  # Cirrhosis -  E/o cirrhosis on CT scan, most likely 2/2 AUD.  Fluid studies c/w portal HTN. No e/o PVT on U/S. TTE WNL. Clinically is volume up w/o asterixis. Will need screening EGD. Adequate diuresis based on Sheila  CT A/P IVC 7/21: Cirrhosis and portal hypertension with moderate abdominopelvic ascites. No portal thrombosis. Portal and hepatic veins are patent.  MELD 20 (7/20)-->23 (7/21)-->20 (7/24), B+  Ascites: s/p 16 L removed 7/20  EV: no prior EGD  HCC: No e/o HCC on CT  HAV IgM/IgG negative, would need immunization  #AUD: Pt feels he can quit easily and not currently interested in pharmacological assistance but may benefit from discussion on rehab/group programs     Recommendations  - c/w prednisolone 40mg daily given MDF>32 and no signs of active infection  -Can d/c ursodiol 500mg BID  -c/w lasix 40 mg, aldactone 100mg for diuresis; can give additional dose of aldactone 100mg and lasix 40mg today but would c/w dose of 40+100 tomorrow if getting LVP  -please given albumin 100mg 25% today   -Given still w/ significant ascites, recommend repeat LVP  -Can give HAV vaccine while inpatient if available through pharmacy   -Please obtain daily weights  -trend CBC, CMP, INR  - etoh cessation  -Clinical monitoring for HE- likely does not need lactulose currently unless asterixis or worsening mental status  -Will need screening EGD- will discuss inpatient vs. outpt  -Ongoing eval for OLT- likely to be continued as an outpt     Hepatology will continue to follow.     All recommendations are tentative until note is attested by attending.     Monica Hale  GI/Hepatology Fellow PGY5    NON-URGENT CONSULTS:  Please email [email or web address]  AT NIGHT AND ON WEEKENDS:  Available on Microsoft Teams  [phone] (Long Range Pager)    After 5pm, please contact the on-call GI fellow. [phone]

## 2023-07-24 NOTE — CHART NOTE - NSCHARTNOTEFT_GEN_A_CORE
Invasive Procedure Team (IPT) consulted for diagnostic/therapeutic paracentesis.    Indications for Diagnostic Paracentesis:  - Consistent with specialty society guidelines, ruling out Bacterial Peritonitis (in particular, SBP) in any admitted patient with ascites (even if asymptomatic), particularly in those with c/f sepsis  - Identifying etiology for new-onset ascites    Indications for Therapeutic Paracentesis:  - Tense ascites associated with abdominal discomfort, urinary retention, respiratory distress, or other evidence of end-organ compromise that is likely 2/2 the ascites itself (and not another etiology)  - Large ascites refractory to medical management (i.e. diuretics)    Absolute Contraindications:  - DIC  - Acute Abdomen  - Cellulitis    Relative Contraindications:  - Coagulopathy (INR > 2.0 per [facility] IPT policy, but specific to performing proceduralist; refer to final recommendations below)  - Severe Thrombocytopenia (Platelet < 20-50, discuss with team/refer to final recommendations below)  - Pregnancy (discuss with team/refer to final recommendations below)  - Cellulitis/Surgical Scarring/Vasculature or Hematoma overlying only safe bedside access site  - MARISOL (specifically for therapeutic paracteneses; related to already poor intravascular perfusion. Risk of MARISOL increases by 1.5x per 1L of ascites fluid removed).    ASSESSMENT/RECOMMENDATIONS  57M with h/o AUD c/b EtOH Cirrhosis who required LVP 16.3L last week (7/20). Reconsulted today for repeat LVP; however, if ascites already has re-accumulated to the point that already he requires repeat therapeutic paracentesis despite medical management, should consider more reliably drainable indwelling line as pt not likely to have access to q3-4d OP paracentesis.   - IPT could perform bedside POCUS 7/25am to assess if it's actually recurred, but:  - If repeat therapeutic paracentesis requested for severe / refractory ascites in < 7 day intervals, should consider IR consultation for indwelling catheter placement.     Skyler Burns MD PGY-3  Case D/W Dr. Guzman

## 2023-07-24 NOTE — PROGRESS NOTE ADULT - PROBLEM SELECTOR PLAN 2
Pt with elevated t bili to 9.0, d bili 4.5, alk phos 388, AST 91, and ALT 18. Elevated GGT suggestive of cholestatic disease. Concerning for cholestasis/biliary obstruction with component of hepatocellular injury/hepatitis 2/2 alcohol use. As of 7/23, LFTs downtrending, downtrending bili of 4.1.  -appreciate GI/Hepatology recs  -trend LFTs  -s/p loading dose NAC 150mg/kg/day. c/w maintenance dose 100mg/kg/day until 7/24  -c/w ursodiol 500mg BID  -c/w prednisolone 40mg daily  -f/u bcx, NGTD  -Hep A immunization as outpatient

## 2023-07-24 NOTE — PROGRESS NOTE ADULT - PROBLEM SELECTOR PLAN 9
sodium 135 > 133 > 131 (7/23). likely in setting of diuresis, could also have underlying SIADH.  -f/u urine lytes  -monitor sodium sodium 135 > 133 > 131 > 134 (7/24). likely in setting of diuresis, could also have underlying SIADH.  -f/u urine lytes  -monitor sodium sodium 135 > 133 > 131 > 134 (7/24). likely in setting of diuresis, could also have underlying SIADH or hypovolemia from third spacing  -monitor sodium

## 2023-07-24 NOTE — PROGRESS NOTE ADULT - ATTENDING COMMENTS
57M with PMH of AUD, ?prior DVT no longer on AC who is presenting with increasing abdominal distention and b/l LE edema secondary to acute decompensated alcoholic cirrhosis.     # AUD  # Alcoholic Hepatitis   # Acute decompensated alcoholic cirrhosis   - no h/o withdrawals, s/p CIWA protocol  - s/p paracentesis 7/20 with 16L removed; fluid analysis not consistent with SBP. f/u culture   - Hepatology following - s/p NAC protocol, d/c ursodiol; initiated steroid on 7/21 - f/u reguarding plan.  - Screening EGD as outpatient     # + C diff - given that patient is having formed stool, suspect this is likely secondary to colonization, no need for ABX at this time. Appreciate ID recs.   # Anemia, Thrombocytopenia, Coagulopathy  - in setting of cirrhosis, continue to monitor.

## 2023-07-24 NOTE — PROGRESS NOTE ADULT - SUBJECTIVE AND OBJECTIVE BOX
Emerson Ocasio MD  PGY 1 Department of Internal Medicine        Patient is a 57y old  Male who presents with a chief complaint of Abdominal distention (24 Jul 2023 08:44)      SUBJECTIVE / OVERNIGHT EVENTS: Pt seen and examined. No acute overnight events. Had 3 BM yesterday. Denies fevers, chills, CP, SOB, Abdominal pain, N/V, Constipation, Diarrhea        MEDICATIONS  (STANDING):  cholestyramine Powder (Sugar-Free) 4 Gram(s) Oral daily  furosemide    Tablet 40 milliGRAM(s) Oral daily  heparin   Injectable 5000 Unit(s) SubCutaneous every 8 hours  lactulose Syrup 20 Gram(s) Oral two times a day  polyethylene glycol 3350 17 Gram(s) Oral daily  prednisoLONE    3 mG/mL Solution (ORAPRED) 40 milliGRAM(s) Oral daily  spironolactone 100 milliGRAM(s) Oral daily  ursodiol Tablet 500 milliGRAM(s) Oral every 12 hours    MEDICATIONS  (PRN):  acetaminophen     Tablet .. 650 milliGRAM(s) Oral every 6 hours PRN Temp greater or equal to 38C (100.4F), Mild Pain (1 - 3)  calamine/zinc oxide Lotion 1 Application(s) Topical daily PRN Rash and/or Itching      I&O's Summary      Vital Signs Last 24 Hrs  T(C): 36.7 (24 Jul 2023 10:31), Max: 37.1 (24 Jul 2023 02:34)  T(F): 98.1 (24 Jul 2023 10:31), Max: 98.8 (24 Jul 2023 02:34)  HR: 81 (24 Jul 2023 10:31) (77 - 82)  BP: 126/77 (24 Jul 2023 10:31) (125/73 - 141/77)  BP(mean): --  RR: 18 (24 Jul 2023 10:31) (18 - 18)  SpO2: 98% (24 Jul 2023 10:31) (95% - 98%)    Parameters below as of 24 Jul 2023 10:31  Patient On (Oxygen Delivery Method): room air        CAPILLARY BLOOD GLUCOSE          PHYSICAL EXAM:  GENERAL: NAD,  resting comfortably  HEAD:  Atraumatic, Normocephalic  EYES: EOMI, PERRL, conjunctiva nl, scleral icterus bilaterally  NECK: No JVD  CHEST/LUNG: Clear to auscultation bilaterally; No wheeze  HEART: Regular rate and rhythm; No murmurs, rubs, or gallops  ABDOMEN: Soft, Nontender, Protuberant abdomen/distended. Reducible umbilical hernia; Bowel sounds present.   EXTREMITIES:  2+ Peripheral Pulses, 2+ pitting edema to lower extremities, No clubbing, cyanosis. Calves are non tender and without erythema or warmth.  PSYCH: AAOx3  NEUROLOGY: non-focal, moving all extremities, cranial nerves intact  SKIN: Multiple areas of excoriations to extremities with scabbing  lesions       LABS:                        11.7   8.27  )-----------( 62       ( 24 Jul 2023 05:19 )             34.9     Auto Eosinophil # x     / Auto Eosinophil % x     / Auto Neutrophil # x     / Auto Neutrophil % x     / BANDS % x                            11.4   9.86  )-----------( 59       ( 23 Jul 2023 05:50 )             33.8     Auto Eosinophil # x     / Auto Eosinophil % x     / Auto Neutrophil # x     / Auto Neutrophil % x     / BANDS % x        07-24    134<L>  |  98  |  12  ----------------------------<  122<H>  3.6   |  24  |  0.65  07-23    131<L>  |  100  |  11  ----------------------------<  104<H>  3.5   |  24  |  0.69    Ca    8.5      24 Jul 2023 05:19  Mg     1.60     07-24  Phos  3.1     07-24  TPro  6.1  /  Alb  2.7<L>  /  TBili  3.6<H>  /  DBili  x   /  AST  46<H>  /  ALT  18  /  AlkPhos  228<H>  07-24  TPro  6.6  /  Alb  2.9<L>  /  TBili  4.1<H>  /  DBili  x   /  AST  42<H>  /  ALT  15  /  AlkPhos  235<H>  07-23    PT/INR - ( 24 Jul 2023 05:19 )   PT: 18.6 sec;   INR: 1.60 ratio         PTT - ( 24 Jul 2023 05:19 )  PTT:41.1 sec      Urinalysis Basic - ( 24 Jul 2023 05:19 )    Color: x / Appearance: x / SG: x / pH: x  Gluc: 122 mg/dL / Ketone: x  / Bili: x / Urobili: x   Blood: x / Protein: x / Nitrite: x   Leuk Esterase: x / RBC: x / WBC x   Sq Epi: x / Non Sq Epi: x / Bacteria: x      Lactate, Blood: 1.4 mmol/L (07-20 @ 04:25)        RADIOLOGY & ADDITIONAL TESTS:    Imaging Personally Reviewed:    Consultant(s) Notes Reviewed:      Care Discussed with Consultants/Other Providers:

## 2023-07-24 NOTE — PROGRESS NOTE ADULT - PROBLEM SELECTOR PLAN 3
Hgb stable at 12.0>12.1>10.8>11.1 with .5, consistent with macrocytic anemia, likely 2/2 alcohol use. B12/folate WNL. Hemolytic anemia considered () however less likely given Retic 3.0%, haptoglobin 105.  -c/w folic acid supplement daily  -monitor

## 2023-07-24 NOTE — PROGRESS NOTE ADULT - PROBLEM SELECTOR PLAN 1
Pt with AUD p/w 1.5 weeks of increasing abdominal distention and b/l LE edema. Ascites and cirrhosis confirmed on US. s/p paracentesis with 16L removed. Patient feeling much better after having fluid removed. Peritoneal fluid showing 157 nucleated cell count r/o SBP.  Fluid gram stain w/ PML, no organisms. SAAG 1.8 - in setting of unremarkable TTE, ascites 2/2 portal hypertension, likely alcoholic cirrhosis. CT abd/pelvis w/ nodular liver. No evidence of PVT/Budd Chiari syndrome. MELD 22, elevated MDF despite steroids.  -GI/hepatology consulted appreciated recs  -s/p IV albumin 25%   -serial abdominal exams  -c/w Lasix 40mg PO, spironolactone 100mg PO  -c/w Miralax daily  -On Lactulose 10g BID will increase to 20g BID, goal 3 BM per day, uptitrate as needed  -EGD/transplant screen as outpatient Pt with AUD p/w 1.5 weeks of increasing abdominal distention and b/l LE edema. Ascites and cirrhosis confirmed on US. s/p paracentesis with 16L removed. Patient feeling much better after having fluid removed. Peritoneal fluid showing 157 nucleated cell count r/o SBP.  Fluid gram stain w/ PML, no organisms. SAAG 1.8 - in setting of unremarkable TTE, ascites 2/2 portal hypertension, likely alcoholic cirrhosis. CT abd/pelvis w/ nodular liver. No evidence of PVT/Budd Chiari syndrome. MELD 22, elevated MDF despite steroids.  -GI/hepatology consulted appreciated recs  -Will give additional IV albumin 25% 100mg 7/24  -Plan for repeat tap 7/25  -c/w Lasix 40mg PO, spironolactone 100mg PO (will give additional dose 7/25 of both per hepatology recs)  -c/w Miralax daily  -c/w Lactulose 20g BID, goal 3 BM per day, uptitrate as needed  -EGD/transplant screen as outpatient Pt with AUD p/w 1.5 weeks of increasing abdominal distention and b/l LE edema. Ascites and cirrhosis confirmed on US. s/p paracentesis with 16L removed. Patient feeling much better after having fluid removed. Peritoneal fluid showing 157 nucleated cell count r/o SBP.  Fluid gram stain w/ PML, no organisms. SAAG 1.8 - in setting of unremarkable TTE, ascites 2/2 portal hypertension, likely alcoholic cirrhosis. CT abd/pelvis w/ nodular liver. No evidence of PVT/Budd Chiari syndrome. MELD 22, elevated MDF despite steroids.  -GI/hepatology consulted appreciated recs  -Will give additional IV albumin 25% 100mg 7/24  -Plan for repeat tap 7/25  -c/w Lasix 40mg PO, spironolactone 100mg PO (will give additional dose 7/24 of both per hepatology recs)  -c/w Miralax daily  -c/w Lactulose 20g BID, goal 3 BM per day, uptitrate as needed  -EGD/transplant screen as outpatient

## 2023-07-24 NOTE — PROGRESS NOTE ADULT - SUBJECTIVE AND OBJECTIVE BOX
Interval Events:  -Feeling fine today, overall w/ improved swelling in LEs  -3BMs yesterday, having trouble sleeping 2/2 BP checks and lab draws at [facility]   Patient denies any abdominal pain, nausea /vomiting, diarrhea or melena / bloody bm. Patient mental status stable    Hospital Medications:  acetaminophen     Tablet .. 650 milliGRAM(s) Oral every 6 hours PRN  calamine/zinc oxide Lotion 1 Application(s) Topical daily PRN  cholestyramine Powder (Sugar-Free) 4 Gram(s) Oral daily  furosemide    Tablet 40 milliGRAM(s) Oral daily  heparin   Injectable 5000 Unit(s) SubCutaneous every 8 hours  lactulose Syrup 20 Gram(s) Oral two times a day  magnesium sulfate  IVPB 1 Gram(s) IV Intermittent once  polyethylene glycol 3350 17 Gram(s) Oral daily  prednisoLONE    3 mG/mL Solution (ORAPRED) 40 milliGRAM(s) Oral daily  spironolactone 100 milliGRAM(s) Oral daily  ursodiol Tablet 500 milliGRAM(s) Oral every 12 hours      ROS: All system reviewed and negative except as mentioned above.    PHYSICAL EXAM:   Vital Signs:  Vital Signs Last 24 Hrs  T(C): 36.4 (24 Jul 2023 06:35), Max: 37.1 (24 Jul 2023 02:34)  T(F): 97.6 (24 Jul 2023 06:35), Max: 98.8 (24 Jul 2023 02:34)  HR: 77 (24 Jul 2023 06:35) (77 - 82)  BP: 135/88 (24 Jul 2023 06:35) (125/73 - 141/77)  BP(mean): --  RR: 18 (24 Jul 2023 06:35) (18 - 19)  SpO2: 95% (24 Jul 2023 06:35) (95% - 98%)    Parameters below as of 24 Jul 2023 06:35  Patient On (Oxygen Delivery Method): room air      Daily     Daily     GENERAL:  diffusely jaundiced, non toxic  HEENT:  NC/AT,  conjunctivae clear and pink, sclera +anicteric  CHEST:  Normal Effort, Breath sounds clear  HEART:  RRR, S1 + S2, no murmurs  ABDOMEN:  +ascites, nontender, soft  EXTREMITIES:  1+ pitting edema, greatly improved from prior  SKIN:  Warm & Dry. No rash or erythema  NEURO:  Alert, oriented, no focal deficit, no asterixis    LABS:                        11.7   8.27  )-----------( 62       ( 24 Jul 2023 05:19 )             34.9     Mean Cell Volume: 104.8 fL (07-24-23 @ 05:19)    07-24    134<L>  |  98  |  12  ----------------------------<  122<H>  3.6   |  24  |  0.65    Ca    8.5      24 Jul 2023 05:19  Phos  3.1     07-24  Mg     1.60     07-24    TPro  6.1  /  Alb  2.7<L>  /  TBili  3.6<H>  /  DBili  x   /  AST  46<H>  /  ALT  18  /  AlkPhos  228<H>  07-24    LIVER FUNCTIONS - ( 24 Jul 2023 05:19 )  Alb: 2.7 g/dL / Pro: 6.1 g/dL / ALK PHOS: 228 U/L / ALT: 18 U/L / AST: 46 U/L / GGT: x           PT/INR - ( 24 Jul 2023 05:19 )   PT: 18.6 sec;   INR: 1.60 ratio         PTT - ( 24 Jul 2023 05:19 )  PTT:41.1 sec  Urinalysis Basic - ( 24 Jul 2023 05:19 )    Color: x / Appearance: x / SG: x / pH: x  Gluc: 122 mg/dL / Ketone: x  / Bili: x / Urobili: x   Blood: x / Protein: x / Nitrite: x   Leuk Esterase: x / RBC: x / WBC x   Sq Epi: x / Non Sq Epi: x / Bacteria: x                              11.7   8.27  )-----------( 62       ( 24 Jul 2023 05:19 )             34.9                         11.4   9.86  )-----------( 59       ( 23 Jul 2023 05:50 )             33.8                         12.1   7.58  )-----------( 55       ( 22 Jul 2023 10:35 )             35.8       Imaging: Images reviewed.  < from: CT Angio Abdomen w/ Oral Cont and w/ IV Cont (07.21.23 @ 17:17) >  FINDINGS:  LOWER CHEST: Mild right middle lobe peripheral ground glass opacity.   Trace left pleural effusion.    LIVER:  Mildly nodular contour with medial segment atrophy consistent   with cirrhosis.  BILE DUCTS: Normal caliber.  GALLBLADDER: Distended. Cholelithiasis.  SPLEEN: Within normal limits.  PANCREAS: Within normal limits.  ADRENALS: Within normal limits.  KIDNEYS/URETERS: Within normal limits.    BLADDER: Within normal limits.  REPRODUCTIVE ORGANS: Prostate size within normal limits.    BOWEL: Colonic diverticulosis. No bowel obstruction. Appendix is normal.  PERITONEUM: Moderate ascites.  VESSELS: Diffusion-weighted lower paraesophageal, gastrohepatic, and   paraumbilical venous collaterals. Portal and hepatic veins are patent.  RETROPERITONEUM/LYMPH NODES: No lymphadenopathy.  ABDOMINAL WALL: Fatty and soft tissue containing umbilical hernia. Small   left fat-containing inguinal hernia.  BONES: Degenerative changes.    IMPRESSION:  Cirrhosis and portal hypertension with moderate abdominopelvic ascites.    No portal thrombosis. Portal and hepatic veins are patent.      < end of copied text >       Interval Events:  -Feeling fine today, overall w/ improved swelling in LEs  -3BMs yesterday, having trouble sleeping 2/2 BP checks and lab draws at night  Patient denies any abdominal pain, nausea /vomiting, diarrhea or melena / bloody bm. Patient mental status stable    Hospital Medications:  acetaminophen     Tablet .. 650 milliGRAM(s) Oral every 6 hours PRN  calamine/zinc oxide Lotion 1 Application(s) Topical daily PRN  cholestyramine Powder (Sugar-Free) 4 Gram(s) Oral daily  furosemide    Tablet 40 milliGRAM(s) Oral daily  heparin   Injectable 5000 Unit(s) SubCutaneous every 8 hours  lactulose Syrup 20 Gram(s) Oral two times a day  magnesium sulfate  IVPB 1 Gram(s) IV Intermittent once  polyethylene glycol 3350 17 Gram(s) Oral daily  prednisoLONE    3 mG/mL Solution (ORAPRED) 40 milliGRAM(s) Oral daily  spironolactone 100 milliGRAM(s) Oral daily  ursodiol Tablet 500 milliGRAM(s) Oral every 12 hours      ROS: All system reviewed and negative except as mentioned above.    PHYSICAL EXAM:   Vital Signs:  Vital Signs Last 24 Hrs  T(C): 36.4 (24 Jul 2023 06:35), Max: 37.1 (24 Jul 2023 02:34)  T(F): 97.6 (24 Jul 2023 06:35), Max: 98.8 (24 Jul 2023 02:34)  HR: 77 (24 Jul 2023 06:35) (77 - 82)  BP: 135/88 (24 Jul 2023 06:35) (125/73 - 141/77)  BP(mean): --  RR: 18 (24 Jul 2023 06:35) (18 - 19)  SpO2: 95% (24 Jul 2023 06:35) (95% - 98%)    Parameters below as of 24 Jul 2023 06:35  Patient On (Oxygen Delivery Method): room air      Daily     Daily     GENERAL:  diffusely jaundiced, non toxic  HEENT:  NC/AT,  conjunctivae clear and pink, sclera +anicteric  CHEST:  Normal Effort, Breath sounds clear  HEART:  RRR, S1 + S2, no murmurs  ABDOMEN:  large ascites, not tense, nontender, soft  EXTREMITIES:  1+ pitting edema, greatly improved from prior  SKIN:  Warm & Dry. No rash or erythema  NEURO:  Alert, oriented, no focal deficit, no asterixis    LABS:                        11.7   8.27  )-----------( 62       ( 24 Jul 2023 05:19 )             34.9     Mean Cell Volume: 104.8 fL (07-24-23 @ 05:19)    07-24    134<L>  |  98  |  12  ----------------------------<  122<H>  3.6   |  24  |  0.65    Ca    8.5      24 Jul 2023 05:19  Phos  3.1     07-24  Mg     1.60     07-24    TPro  6.1  /  Alb  2.7<L>  /  TBili  3.6<H>  /  DBili  x   /  AST  46<H>  /  ALT  18  /  AlkPhos  228<H>  07-24    LIVER FUNCTIONS - ( 24 Jul 2023 05:19 )  Alb: 2.7 g/dL / Pro: 6.1 g/dL / ALK PHOS: 228 U/L / ALT: 18 U/L / AST: 46 U/L / GGT: x           PT/INR - ( 24 Jul 2023 05:19 )   PT: 18.6 sec;   INR: 1.60 ratio         PTT - ( 24 Jul 2023 05:19 )  PTT:41.1 sec  Urinalysis Basic - ( 24 Jul 2023 05:19 )    Color: x / Appearance: x / SG: x / pH: x  Gluc: 122 mg/dL / Ketone: x  / Bili: x / Urobili: x   Blood: x / Protein: x / Nitrite: x   Leuk Esterase: x / RBC: x / WBC x   Sq Epi: x / Non Sq Epi: x / Bacteria: x                              11.7   8.27  )-----------( 62       ( 24 Jul 2023 05:19 )             34.9                         11.4   9.86  )-----------( 59       ( 23 Jul 2023 05:50 )             33.8                         12.1   7.58  )-----------( 55       ( 22 Jul 2023 10:35 )             35.8       Imaging: Images reviewed.  < from: CT Angio Abdomen w/ Oral Cont and w/ IV Cont (07.21.23 @ 17:17) >  FINDINGS:  LOWER CHEST: Mild right middle lobe peripheral ground glass opacity.   Trace left pleural effusion.    LIVER:  Mildly nodular contour with medial segment atrophy consistent   with cirrhosis.  BILE DUCTS: Normal caliber.  GALLBLADDER: Distended. Cholelithiasis.  SPLEEN: Within normal limits.  PANCREAS: Within normal limits.  ADRENALS: Within normal limits.  KIDNEYS/URETERS: Within normal limits.    BLADDER: Within normal limits.  REPRODUCTIVE ORGANS: Prostate size within normal limits.    BOWEL: Colonic diverticulosis. No bowel obstruction. Appendix is normal.  PERITONEUM: Moderate ascites.  VESSELS: Diffusion-weighted lower paraesophageal, gastrohepatic, and   paraumbilical venous collaterals. Portal and hepatic veins are patent.  RETROPERITONEUM/LYMPH NODES: No lymphadenopathy.  ABDOMINAL WALL: Fatty and soft tissue containing umbilical hernia. Small   left fat-containing inguinal hernia.  BONES: Degenerative changes.    IMPRESSION:  Cirrhosis and portal hypertension with moderate abdominopelvic ascites.    No portal thrombosis. Portal and hepatic veins are patent.      < end of copied text >

## 2023-07-25 LAB
ALBUMIN SERPL ELPH-MCNC: 3.1 G/DL — LOW (ref 3.3–5)
ALP SERPL-CCNC: 217 U/L — HIGH (ref 40–120)
ALT FLD-CCNC: 23 U/L — SIGNIFICANT CHANGE UP (ref 4–41)
ANION GAP SERPL CALC-SCNC: 9 MMOL/L — SIGNIFICANT CHANGE UP (ref 7–14)
APTT BLD: 37.5 SEC — HIGH (ref 27–36.3)
AST SERPL-CCNC: 50 U/L — HIGH (ref 4–40)
BILIRUB SERPL-MCNC: 3.5 MG/DL — HIGH (ref 0.2–1.2)
BUN SERPL-MCNC: 12 MG/DL — SIGNIFICANT CHANGE UP (ref 7–23)
CALCIUM SERPL-MCNC: 8.5 MG/DL — SIGNIFICANT CHANGE UP (ref 8.4–10.5)
CHLORIDE SERPL-SCNC: 99 MMOL/L — SIGNIFICANT CHANGE UP (ref 98–107)
CO2 SERPL-SCNC: 25 MMOL/L — SIGNIFICANT CHANGE UP (ref 22–31)
CREAT SERPL-MCNC: 0.63 MG/DL — SIGNIFICANT CHANGE UP (ref 0.5–1.3)
CULTURE RESULTS: SIGNIFICANT CHANGE UP
EGFR: 112 ML/MIN/1.73M2 — SIGNIFICANT CHANGE UP
GLUCOSE SERPL-MCNC: 109 MG/DL — HIGH (ref 70–99)
HCT VFR BLD CALC: 35 % — LOW (ref 39–50)
HGB BLD-MCNC: 11.7 G/DL — LOW (ref 13–17)
IGG SERPL-MCNC: 2395 MG/DL — HIGH (ref 603–1613)
IGG1 SER-MCNC: 1057 MG/DL — HIGH (ref 248–810)
IGG2 SER-MCNC: 768 MG/DL — HIGH (ref 130–555)
IGG3 SER-MCNC: 73 MG/DL — SIGNIFICANT CHANGE UP (ref 15–102)
IGG4 SER-MCNC: 224 MG/DL — HIGH (ref 2–96)
INR BLD: 1.49 RATIO — HIGH (ref 0.88–1.16)
MAGNESIUM SERPL-MCNC: 1.8 MG/DL — SIGNIFICANT CHANGE UP (ref 1.6–2.6)
MCHC RBC-ENTMCNC: 33.4 GM/DL — SIGNIFICANT CHANGE UP (ref 32–36)
MCHC RBC-ENTMCNC: 35.7 PG — HIGH (ref 27–34)
MCV RBC AUTO: 106.7 FL — HIGH (ref 80–100)
NRBC # BLD: 0 /100 WBCS — SIGNIFICANT CHANGE UP (ref 0–0)
NRBC # FLD: 0 K/UL — SIGNIFICANT CHANGE UP (ref 0–0)
PHOSPHATE SERPL-MCNC: 3.7 MG/DL — SIGNIFICANT CHANGE UP (ref 2.5–4.5)
PLATELET # BLD AUTO: 60 K/UL — LOW (ref 150–400)
POTASSIUM SERPL-MCNC: 3.7 MMOL/L — SIGNIFICANT CHANGE UP (ref 3.5–5.3)
POTASSIUM SERPL-SCNC: 3.7 MMOL/L — SIGNIFICANT CHANGE UP (ref 3.5–5.3)
PROT SERPL-MCNC: 6.8 G/DL — SIGNIFICANT CHANGE UP (ref 6–8.3)
PROTHROM AB SERPL-ACNC: 17.4 SEC — HIGH (ref 10.5–13.4)
RBC # BLD: 3.28 M/UL — LOW (ref 4.2–5.8)
RBC # FLD: 15.5 % — HIGH (ref 10.3–14.5)
SODIUM SERPL-SCNC: 133 MMOL/L — LOW (ref 135–145)
SPECIMEN SOURCE: SIGNIFICANT CHANGE UP
WBC # BLD: 7.71 K/UL — SIGNIFICANT CHANGE UP (ref 3.8–10.5)
WBC # FLD AUTO: 7.71 K/UL — SIGNIFICANT CHANGE UP (ref 3.8–10.5)

## 2023-07-25 PROCEDURE — 99232 SBSQ HOSP IP/OBS MODERATE 35: CPT

## 2023-07-25 PROCEDURE — 99232 SBSQ HOSP IP/OBS MODERATE 35: CPT | Mod: GC

## 2023-07-25 RX ORDER — SPIRONOLACTONE 25 MG/1
100 TABLET, FILM COATED ORAL ONCE
Refills: 0 | Status: DISCONTINUED | OUTPATIENT
Start: 2023-07-25 | End: 2023-07-25

## 2023-07-25 RX ORDER — FUROSEMIDE 40 MG
40 TABLET ORAL EVERY 12 HOURS
Refills: 0 | Status: DISCONTINUED | OUTPATIENT
Start: 2023-07-25 | End: 2023-07-28

## 2023-07-25 RX ORDER — SPIRONOLACTONE 25 MG/1
100 TABLET, FILM COATED ORAL
Refills: 0 | Status: DISCONTINUED | OUTPATIENT
Start: 2023-07-25 | End: 2023-07-25

## 2023-07-25 RX ORDER — HEPATITIS A VIRUS VACCINE 50 UNIT/ML
1 SYRINGE (ML) INTRAMUSCULAR ONCE
Refills: 0 | Status: COMPLETED | OUTPATIENT
Start: 2023-07-25 | End: 2023-07-27

## 2023-07-25 RX ORDER — FUROSEMIDE 40 MG
40 TABLET ORAL EVERY 12 HOURS
Refills: 0 | Status: DISCONTINUED | OUTPATIENT
Start: 2023-07-25 | End: 2023-07-25

## 2023-07-25 RX ORDER — SPIRONOLACTONE 25 MG/1
100 TABLET, FILM COATED ORAL
Refills: 0 | Status: DISCONTINUED | OUTPATIENT
Start: 2023-07-25 | End: 2023-07-28

## 2023-07-25 RX ADMIN — URSODIOL 500 MILLIGRAM(S): 250 TABLET, FILM COATED ORAL at 05:14

## 2023-07-25 RX ADMIN — SPIRONOLACTONE 100 MILLIGRAM(S): 25 TABLET, FILM COATED ORAL at 17:21

## 2023-07-25 RX ADMIN — LACTULOSE 20 GRAM(S): 10 SOLUTION ORAL at 05:14

## 2023-07-25 RX ADMIN — Medication 40 MILLIGRAM(S): at 05:14

## 2023-07-25 RX ADMIN — SPIRONOLACTONE 100 MILLIGRAM(S): 25 TABLET, FILM COATED ORAL at 05:14

## 2023-07-25 RX ADMIN — HEPARIN SODIUM 5000 UNIT(S): 5000 INJECTION INTRAVENOUS; SUBCUTANEOUS at 21:37

## 2023-07-25 RX ADMIN — HEPARIN SODIUM 5000 UNIT(S): 5000 INJECTION INTRAVENOUS; SUBCUTANEOUS at 05:14

## 2023-07-25 RX ADMIN — Medication 40 MILLIGRAM(S): at 17:21

## 2023-07-25 RX ADMIN — POLYETHYLENE GLYCOL 3350 17 GRAM(S): 17 POWDER, FOR SOLUTION ORAL at 12:13

## 2023-07-25 RX ADMIN — HEPARIN SODIUM 5000 UNIT(S): 5000 INJECTION INTRAVENOUS; SUBCUTANEOUS at 14:48

## 2023-07-25 NOTE — PROGRESS NOTE ADULT - PROBLEM SELECTOR PLAN 8
on exam has multiple areas of excoriation and scabbing to all extremities. Pt reports he has intermittent diffuse skin pruritis, not experiencing at this time. Has tried multiple OTC creams to no effect. Pruritis likely 2/2 elevated bilirubin and liver dysfunction.   -c/w cholestyramine 4g daily  -c/w calamine lotion  -monitor on exam has multiple areas of excoriation and scabbing to all extremities. Pt reports he has intermittent diffuse skin pruritis, not experiencing at this time. Has tried multiple OTC creams to no effect. Pruritis likely 2/2 elevated bilirubin and liver dysfunction.  -stop cholestyramine   -c/w calamine lotion  -monitor

## 2023-07-25 NOTE — DIETITIAN INITIAL EVALUATION ADULT - PROBLEM SELECTOR PLAN 1
Pt with 1.5 weeks of increasing abdominal distention and b/l LE edema. On exam. tense ascites appreciated. Concerning for ascites in setting of possible liver failure from alcohol/Tylenol use vs. Budd Chiari syndrome, less likely malignant ascites or from acute decompensated heart failure.    -pt will likely need diagnostic and large-volume therapeutic paracentesis -> procedure team consult placed  -possible IR-guided paracentesis if adequate window unable to be obtained  -pt currently w/o fevers, leukocytosis, or abdominal pain/tenderness, unlikely SBP -> monitor off antibiotics for now  -US abdominal dopplers ordered to evaluate for cirrhosis and r/o hepatic vein thrombosis/Budd Chiari syndrome  -CTAP with IV contrast ordered as urgent, attempted to expedite overnight, but per ED, CT studies currently taking 5-6 hours  -TTE ordered to r/o structural heart disease  -serial abdominal exams  -acetaminophen level negative

## 2023-07-25 NOTE — PROGRESS NOTE ADULT - PROBLEM SELECTOR PLAN 7
Plt count 58>44>52>50 > 59 > 62 (7/24). Also with elevated PT/PTT, elevated D-dimer, decreased fibrinogen on admission. Suspect coagulopathies from bone marrow suppression in setting of chronic alcohol use and possibly from liver failure. Low suspicion for TTP given lack of fevers, renal failure, and AMS. DIC also considered but unlikely given normal haptoglobin. PT/PTT/INR downtrending as of 7/24  -monitor plt  -transfuse <10, <20k and febrile, and <50k w/ active bleeding

## 2023-07-25 NOTE — PROGRESS NOTE ADULT - ASSESSMENT
58yo w/ PMHx AUD, remote hx DVT (not on AC, ?if provoked) presenting w/ jaundice and ascites in setting heavy alcohol use c/f alcoholic hepatitis and cirrhosis.    #Alcoholic hepatitis: Clinically improving on steroids (7/21-) w/ Maddrey 27.4--> --> 49--> 27 --> 21.9 today. Per ID, C. diff likely colonization. Lille 0.157  # Cirrhosis -  E/o cirrhosis on CT scan, most likely 2/2 AUD though w/ MARTA 1:320.  Fluid studies c/w portal HTN. No e/o PVT on U/S. TTE WNL. Clinically is volume up w/o asterixis. Will need screening EGD. Still w/ significant ascites on exam despite adequate diuresis based on Sheila  CT A/P IVC 7/21: Cirrhosis and portal hypertension with moderate abdominopelvic ascites. No portal thrombosis. Portal and hepatic veins are patent.  MELD 20 (7/20)-->23 (7/21)-->20 (7/24) --> 20 (7/25), B+  Ascites: s/p 16 L removed 7/20, pending U/S today   EV: no prior EGD  HCC: No e/o HCC on CT  HAV IgM/IgG negative, would need immunization  #AUD: Pt feels he can quit easily and not currently interested in pharmacological assistance but may benefit from discussion on rehab/group programs     Recommendations  - c/w prednisolone 40mg daily given MDF>32 and no signs of active infection- based on Lille, can continue for 28 days  -F/u U/S and possible tap today- would replete albumin if tapped  -c/w lasix 40 mg, aldactone 100mg for diuresis  -Can give HAV vaccine while inpatient if available through pharmacy   -Please obtain daily weights  -trend CBC, CMP, INR  - etoh cessation  -Clinical monitoring for HE- likely does not need lactulose currently unless asterixis or worsening mental status  -Will need screening EGD- will discuss inpatient vs. outpt  -Ongoing eval for OLT- likely to be continued as an outpt     Hepatology will continue to follow.     All recommendations are tentative until note is attested by attending.     Monica Hale  GI/Hepatology Fellow PGY5    NON-URGENT CONSULTS:  Please email [email or web address]  AT NIGHT AND ON WEEKENDS:  Available on [employer] Teams  [phone] (Long Range Pager)    After 5pm, please contact the on-call GI fellow. [phone] 58yo w/ PMHx AUD, remote hx DVT (not on AC, ?if provoked) presenting w/ jaundice and ascites in setting heavy alcohol use c/f alcoholic hepatitis and cirrhosis.    #Alcoholic hepatitis: Clinically improving on steroids (7/21-) w/ Maddrey 27.4--> --> 49--> 27 --> 21.9 today. Per ID, C. diff likely colonization. Lille 0.157  # Cirrhosis -  E/o cirrhosis on CT scan, most likely 2/2 AUD though w/ MARTA 1:320.  Fluid studies c/w portal HTN. No e/o PVT on U/S. TTE WNL. Clinically is volume up w/o asterixis. Will need screening EGD. Still w/ significant ascites on exam despite adequate diuresis based on Sheila  CT A/P IVC 7/21: Cirrhosis and portal hypertension with moderate abdominopelvic ascites. No portal thrombosis. Portal and hepatic veins are patent.  MELD 20 (7/20)-->23 (7/21)-->20 (7/24) --> 20 (7/25), B+  Ascites: s/p 16 L removed 7/20, pending U/S today   EV: no prior EGD  HCC: No e/o HCC on CT  HAV IgM/IgG negative, would need immunization  #AUD: Pt feels he can quit easily and not currently interested in pharmacological assistance but may benefit from discussion on rehab/group programs. 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     Recommendations  - c/w prednisolone 40mg daily given MDF>32 and no signs of active infection- based on Lille, can continue for 28 days  -F/u U/S and possible tap today- would replete albumin if tapped  -ok w/ increase in diuresis to lasix 40mg BID and aldactone 100mg BID- would trend cr  -Can give albumin 100mL 25% x2 today and then stop  -daily weights  -Can give HAV vaccine while inpatient if available through pharmacy   -Please obtain daily weights  -trend CBC, CMP, INR  - etoh cessation- discussed today and not interested in medications at this time  -Clinical monitoring for HE- likely does not need lactulose currently unless asterixis or worsening mental status  -Will need screening EGD- will discuss inpatient vs. outpt  -Ongoing eval for OLT- likely to be continued as an outpt     Hepatology will continue to follow.     All recommendations are tentative until note is attested by attending.     Monica Hale  GI/Hepatology Fellow PGY5    NON-URGENT CONSULTS:  Please email [email or web address]  AT NIGHT AND ON WEEKENDS:  Available on [employer] Teams  [phone] (Long Range Pager)    After 5pm, please contact the on-call GI fellow. [phone]

## 2023-07-25 NOTE — DIETITIAN INITIAL EVALUATION ADULT - PROBLEM SELECTOR PLAN 10
DVT ppx: Hold pharmacologic ppx for now given possible paracentesis in AM and thrombocytopenia  Diet: NPO for now pending MRCP

## 2023-07-25 NOTE — DIETITIAN INITIAL EVALUATION ADULT - PERTINENT LABORATORY DATA
07-25    133<L>  |  99  |  12  ----------------------------<  109<H>  3.7   |  25  |  0.63    Ca    8.5      25 Jul 2023 06:32  Phos  3.7     07-25  Mg     1.80     07-25    TPro  6.8  /  Alb  3.1<L>  /  TBili  3.5<H>  /  DBili  x   /  AST  50<H>  /  ALT  23  /  AlkPhos  217<H>  07-25  A1C with Estimated Average Glucose Result: 4.6 % (07-19-23 @ 07:44)

## 2023-07-25 NOTE — DIETITIAN INITIAL EVALUATION ADULT - PROBLEM SELECTOR PLAN 2
Pt with elevated t bili to 7.3, alk phos 388, AST 91, and ALT 18. Concerning for cholestasis/biliary obstruction with component of hepatocellular injury/hepatitis.     -f/u US abdominal dopplers and CTAP with IV contrast  -check MRCP w/wo contrast to evaluate biliary system,  as unclear if visualization of biliary tree will be adequate on CTAP given ascites  -check direct vs. indirect bili and GGT  -trend LFTs  -possible hepatology consult in AM pending imaging results

## 2023-07-25 NOTE — PROGRESS NOTE ADULT - PROBLEM SELECTOR PLAN 3
Hgb stable at 12.0>12.1>10.8>11.1 with .5, consistent with macrocytic anemia, likely 2/2 alcohol use. B12/folate WNL. Hemolytic anemia considered () however less likely given Retic 3.0%, haptoglobin 105.  -c/w folic acid supplement daily  -monitor Hgb stable at 12.0>12.1>10.8>11.1 > 11.7 with .5, consistent with macrocytic anemia, likely 2/2 alcohol use. B12/folate WNL. Hemolytic anemia considered () however less likely given Retic 3.0%, haptoglobin 105.  -c/w folic acid supplement daily  -monitor

## 2023-07-25 NOTE — DIETITIAN INITIAL EVALUATION ADULT - PERTINENT MEDS FT
I will SWITCH the dose or number of times a day I take the medications listed below when I get home from the hospital:  None
MEDICATIONS  (STANDING):  furosemide    Tablet 40 milliGRAM(s) Oral every 12 hours  heparin   Injectable 5000 Unit(s) SubCutaneous every 8 hours  hepatitis A (virus) Vaccine - Adult (HAVRIX) 1 milliLiter(s) IntraMuscular once  polyethylene glycol 3350 17 Gram(s) Oral daily  prednisoLONE    3 mG/mL Solution (ORAPRED) 40 milliGRAM(s) Oral daily  spironolactone 100 milliGRAM(s) Oral two times a day    MEDICATIONS  (PRN):  acetaminophen     Tablet .. 650 milliGRAM(s) Oral every 6 hours PRN Temp greater or equal to 38C (100.4F), Mild Pain (1 - 3)  calamine/zinc oxide Lotion 1 Application(s) Topical daily PRN Rash and/or Itching

## 2023-07-25 NOTE — PROGRESS NOTE ADULT - PROBLEM SELECTOR PLAN 2
Pt with elevated t bili to 9.0, d bili 4.5, alk phos 388, AST 91, and ALT 18. Elevated GGT suggestive of cholestatic disease. Concerning for cholestasis/biliary obstruction with component of hepatocellular injury/hepatitis 2/2 alcohol use. As of 7/23, LFTs downtrending, downtrending bili of 4.1.  -appreciate GI/Hepatology recs  -trend LFTs  -s/p loading dose NAC 150mg/kg/day. c/w maintenance dose 100mg/kg/day until 7/24  -c/w ursodiol 500mg BID  -c/w prednisolone 40mg daily  -f/u bcx, NGTD  -Hep A immunization as outpatient Pt with elevated t bili to 9.0, d bili 4.5, alk phos 388, AST 91, and ALT 18. Elevated GGT suggestive of cholestatic disease. Concerning for cholestasis/biliary obstruction with component of hepatocellular injury/hepatitis 2/2 alcohol use. As of 7/25, LFTs downtrending, downtrending bili of 3.5.  -appreciate GI/Hepatology recs  -trend LFTs  -s/p loading dose NAC 150mg/kg/day. c/w maintenance dose 100mg/kg/day until 7/24  -s/p ursodiol 500mg BID  -c/w prednisolone 40mg daily  -f/u bcx, NGTD  -Hep A immunization as outpatient Pt with elevated t bili to 9.0, d bili 4.5, alk phos 388, AST 91, and ALT 18. Elevated GGT suggestive of cholestatic disease. Concerning for cholestasis/biliary obstruction with component of hepatocellular injury/hepatitis 2/2 alcohol use. As of 7/25, LFTs downtrending, downtrending bili of 3.5.  -appreciate GI/Hepatology recs  -trend LFTs  -s/p loading dose NAC 150mg/kg/day. c/w maintenance dose 100mg/kg/day until 7/24  -s/p ursodiol 500mg BID  -c/w prednisolone 40mg daily  -f/u bcx, NGTD

## 2023-07-25 NOTE — PROGRESS NOTE ADULT - ATTENDING COMMENTS
57M with PMH of AUD, ?prior DVT no longer on AC who is presenting with increasing abdominal distention and b/l LE edema secondary to acute decompensated alcoholic cirrhosis.     # AUD  # Alcoholic Hepatitis   # Acute decompensated alcoholic cirrhosis   - no h/o withdrawals, s/p CIWA protocol  - s/p paracentesis 7/20 with 16L removed; fluid analysis not consistent with SBP. f/u culture   - Hepatology following - s/p NAC protocol, d/c ursodiol and cholestyramine; initiated steroid on 7/21 - calculate lille score help determine duration   - Screening EGD as outpatient     # + C diff - given that patient is having formed stool, suspect this is likely secondary to colonization, no need for ABX at this time. Appreciate ID recs.   # Anemia, Thrombocytopenia, Coagulopathy  - in setting of cirrhosis, continue to monitor.

## 2023-07-25 NOTE — DIETITIAN INITIAL EVALUATION ADULT - PROBLEM SELECTOR PLAN 7
Pt drinks 3-5 "double glass" of [place] whiskey after work for the past 25 years. Denies hx of withdrawal syndrome. Last drink a week ago, but pt with elevated blood alcohol level. On exam, no tongue fasciculations or tremors.    -s/p librium x1  -start symptom-triggered CIWA with Ativan for now  -start PO thiamine 100 mg daily, folic acid 1 mg daily, and MVI  -aspiration precautions  -seizure precautions  -fall risk protocol  -SW consult

## 2023-07-25 NOTE — DIETITIAN INITIAL EVALUATION ADULT - PROBLEM SELECTOR PLAN 4
Likely from abdominal distention 2/2 ascites. Pt currently maintaining SpO2 high 90s to 100% RA.     -monitor pulse ox q4hrs  -f/u TTE to r/o structural heart disease   -re-evaluate for persistence of SOB following therapeutic paracentesis

## 2023-07-25 NOTE — DIETITIAN INITIAL EVALUATION ADULT - OTHER INFO
Per chart review,56 yo M with PMHx of EtOH abuse (3-5 double [place] whiskey 5 days a week x25 years) and DVT presents for swelling of abdomen and legs for past 10 days, likely ascites 2/2 alcoholic cirrhosis.     Patient seen at bedside. Reports his appetite remains good in hospital. PO intake varies from % per RN flow sheet. Denies chewing and swallowing difficulties on regular diet. Denies any GI distress (nausea/vomiting/diarrhea/constipation.) Last BM 7/24. Pt reported his -220lbs w/o specific time fram. Most recent adm weight 7/.4lbs. Weight trend reflects weight gain of 61.4lbs/28.55%.  Patient dx with alcoholic cirrhosis noted with ascites, 2+ b/l legs and 1+ generalized edema per RN flow sheet, Lasix ordered which can cause weight fluctuations.  Pt noted on steroid. A1C 4.6% WNL. Recommend consider add MVI to promote micronutrient coverage.   Nutrition education provided on Cirrhosis Nutrition Therapy. Discussed the importance of eating well balanced diet with adequate protein and healthy fats. RD to remain available for further nutritional interventions as indicated.  Per chart review,58 yo M with PMHx of EtOH abuse (3-5 double [place] whiskey 5 days a week x25 years) and DVT presents for swelling of abdomen and legs for past 10 days, likely ascites 2/2 alcoholic cirrhosis.     Patient seen at bedside. Reports his appetite remains good in hospital. PO intake varies from % per RN flow sheet. Food preferences explored and honored to encourage PO intake. Pt is amenable to receive double portions of entree at all meals. Pt denies chewing and swallowing difficulties on regular diet. Denies any GI distress (nausea/vomiting/diarrhea/constipation.) Last BM 7/24. Pt reported his -220lbs w/o specific time fram. Most recent adm weight 7/.4lbs. Weight trend reflects weight gain of 61.4lbs/28.55%.  Patient dx with alcoholic cirrhosis noted with ascites, 2+ b/l legs and 1+ generalized edema per RN flow sheet, Lasix ordered which can cause weight fluctuations.  Pt noted on steroid. A1C 4.6% WNL. Recommend consider add MVI to promote micronutrient coverage.   Nutrition education provided on Cirrhosis Nutrition Therapy. Discussed the importance of eating well balanced diet with adequate protein and healthy fats. RD to remain available for further nutritional interventions as indicated.

## 2023-07-25 NOTE — DIETITIAN INITIAL EVALUATION ADULT - ETIOLOGY
Excessive energy intake in setting of ETOH abuse  physiological causes in setting of catabolic stress  Pt dx with alcoholic cirrhosis

## 2023-07-25 NOTE — DIETITIAN INITIAL EVALUATION ADULT - PROBLEM SELECTOR PLAN 6
Plt count 58. Suspect from bone marrow suppression in setting of chronic alcohol use and possibly from liver failure. Low suspicion for TTP given lack of fevers, renal failure, and AMS, though DIC possibility given ?hyperpigmentation on b/l LE.    -monitor plt  -check coag, D-dimer, and fibrinogen to r/o DIC

## 2023-07-25 NOTE — PROGRESS NOTE ADULT - PROBLEM SELECTOR PLAN 11
as of 7/24, mag 1.6, phos 3.1, potassium 3.6. Likely in setting of chronic alcohol use.   -Goal Mag 2.0, Phos 3.0, Potassium 4.0  -will replete as needed as of 7/25, mag 1.8, phos 3.7, potassium 3.7. Likely in setting of chronic alcohol use.   -Goal Mag 2.0, Phos 3.0, Potassium 4.0  -will replete as needed

## 2023-07-25 NOTE — PROGRESS NOTE ADULT - SUBJECTIVE AND OBJECTIVE BOX
Emerson Ocasio MD  PGY 1 Department of Internal Medicine        Patient is a 57y old  Male who presents with a chief complaint of Abdominal distention (25 Jul 2023 08:49)      SUBJECTIVE / OVERNIGHT EVENTS: Pt seen and examined. No acute overnight events. Multiple BM since yesterday. Denies fevers, chills, CP, SOB, Abdominal pain, N/V, Constipation, Diarrhea        MEDICATIONS  (STANDING):  furosemide    Tablet 40 milliGRAM(s) Oral every 12 hours  heparin   Injectable 5000 Unit(s) SubCutaneous every 8 hours  polyethylene glycol 3350 17 Gram(s) Oral daily  prednisoLONE    3 mG/mL Solution (ORAPRED) 40 milliGRAM(s) Oral daily  spironolactone 100 milliGRAM(s) Oral two times a day    MEDICATIONS  (PRN):  acetaminophen     Tablet .. 650 milliGRAM(s) Oral every 6 hours PRN Temp greater or equal to 38C (100.4F), Mild Pain (1 - 3)  calamine/zinc oxide Lotion 1 Application(s) Topical daily PRN Rash and/or Itching      I&O's Summary      Vital Signs Last 24 Hrs  T(C): 36.7 (25 Jul 2023 08:44), Max: 36.9 (24 Jul 2023 12:27)  T(F): 98.1 (25 Jul 2023 08:44), Max: 98.5 (25 Jul 2023 04:38)  HR: 86 (25 Jul 2023 08:44) (67 - 93)  BP: 142/82 (25 Jul 2023 08:44) (122/79 - 149/85)  BP(mean): --  RR: 18 (25 Jul 2023 08:44) (17 - 18)  SpO2: 97% (25 Jul 2023 08:44) (97% - 100%)    Parameters below as of 25 Jul 2023 08:44  Patient On (Oxygen Delivery Method): room air        CAPILLARY BLOOD GLUCOSE          PHYSICAL EXAM:  GENERAL: NAD,  resting comfortably  HEAD:  Atraumatic, Normocephalic  EYES: EOMI, PERRL, conjunctiva nl, scleral icterus bilaterally  NECK: No JVD  CHEST/LUNG: Clear to auscultation bilaterally; No wheeze  HEART: Regular rate and rhythm; No murmurs, rubs, or gallops  ABDOMEN: Abdomen more distended and firm than yesterday, Nontender. Reducible umbilical hernia; Bowel sounds present.   EXTREMITIES:  2+ Peripheral Pulses, 2+ pitting edema to lower extremities, No clubbing, cyanosis. Calves are non tender and without erythema or warmth.  PSYCH: AAOx3  NEUROLOGY: non-focal, moving all extremities, cranial nerves intact  SKIN: Multiple areas of excoriations to extremities with scabbing  lesions       LABS:                        11.7   7.71  )-----------( 60       ( 25 Jul 2023 06:32 )             35.0     Auto Eosinophil # x     / Auto Eosinophil % x     / Auto Neutrophil # x     / Auto Neutrophil % x     / BANDS % x                            11.7   8.27  )-----------( 62       ( 24 Jul 2023 05:19 )             34.9     Auto Eosinophil # x     / Auto Eosinophil % x     / Auto Neutrophil # x     / Auto Neutrophil % x     / BANDS % x        07-25    133<L>  |  99  |  12  ----------------------------<  109<H>  3.7   |  25  |  0.63  07-24    134<L>  |  98  |  12  ----------------------------<  122<H>  3.6   |  24  |  0.65    Ca    8.5      25 Jul 2023 06:32  Mg     1.80     07-25  Phos  3.7     07-25  TPro  6.8  /  Alb  3.1<L>  /  TBili  3.5<H>  /  DBili  x   /  AST  50<H>  /  ALT  23  /  AlkPhos  217<H>  07-25  TPro  6.1  /  Alb  2.7<L>  /  TBili  3.6<H>  /  DBili  x   /  AST  46<H>  /  ALT  18  /  AlkPhos  228<H>  07-24    PT/INR - ( 25 Jul 2023 06:32 )   PT: 17.4 sec;   INR: 1.49 ratio         PTT - ( 25 Jul 2023 06:32 )  PTT:37.5 sec      Urinalysis Basic - ( 25 Jul 2023 06:32 )    Color: x / Appearance: x / SG: x / pH: x  Gluc: 109 mg/dL / Ketone: x  / Bili: x / Urobili: x   Blood: x / Protein: x / Nitrite: x   Leuk Esterase: x / RBC: x / WBC x   Sq Epi: x / Non Sq Epi: x / Bacteria: x      Lactate, Blood: 1.4 mmol/L (07-20 @ 04:25)        RADIOLOGY & ADDITIONAL TESTS:    Imaging Personally Reviewed:    Consultant(s) Notes Reviewed:      Care Discussed with Consultants/Other Providers:

## 2023-07-25 NOTE — CHART NOTE - NSCHARTNOTEFT_GEN_A_CORE
IPT re-consulted for paracentesis. Pt seen again at bedside today comfortably seated in chair. Moved to bed for POCUS. Has re-accumulation of 5cm pocket on LLQ, >10cm pocket on RLQ. Pt currently asympomatic / unbothered by the re-accumulation as compared to prior.   - Recommend further optimization of medical management  - Pt does not require urgent repeat therapeutic paracentesis based on symptomatology & labs at this time  - If pt to be discharged and able to tolerate ascitic re-accumulation > 7 days, could consider discharge-optimizing therapeutic paracentesis at that time.  - If pt unable to tolerate medical management (either d/t end-organ compromising side effects or refractory accumulation of fluid) and requires paracenteses in < 7 day intervals, would consider more definitive management (either indwelling drain or TIPS as examples).   - Reconsult as indicated based on this & prior documentation or as symptoms change    Skyler Burns MD PGY-3  Case D/W Dr. Guzman

## 2023-07-25 NOTE — DIETITIAN INITIAL EVALUATION ADULT - PROBLEM SELECTOR PLAN 5
Hgb 12.1 with . Macrocytic anemia, likely in setting of chronic alcohol use with folate/vit B12 deficiency, though given elevated t bili and thrombocytopenia, MAHA also a possibility.    -monitor H/H   -check iron studies, folate, vit B12  -check hemolysis labs, including LDH, haptoglobin, retic count  -check coag, D-dimer, and fibrinogen

## 2023-07-25 NOTE — DIETITIAN INITIAL EVALUATION ADULT - PROBLEM SELECTOR PLAN 9
QTc on admission  ms. Likely in setting of hypomagnesemia with serum Mg 1.1 on admission.     -avoid QT prolonging agents  -replete serum Mg with IV MgSO4 and recheck QTc with repeat EKG

## 2023-07-25 NOTE — PROGRESS NOTE ADULT - PROBLEM SELECTOR PLAN 9
sodium 135 > 133 > 131 > 134 (7/24). likely in setting of diuresis, could also have underlying SIADH.  -f/u urine lytes  -monitor sodium sodium 135 > 133 > 131 > 134 (7/24). likely in setting of diuresis, could also have underlying SIADH.  -monitor sodium sodium 135 > 133 > 131 > 134 > 133 (7/25). likely in setting of diuresis, could also have underlying SIADH.  -monitor sodium

## 2023-07-25 NOTE — PROGRESS NOTE ADULT - SUBJECTIVE AND OBJECTIVE BOX
Interval Events:   -Feeling well today. Denies any abdominal pain, N/V, melena/hematochezia  -Pending repeat U/S and possible para today   -Day 5 of steroids. Radha 0.157, MELD 20, MDF 21.9    Hospital Medications:  acetaminophen     Tablet .. 650 milliGRAM(s) Oral every 6 hours PRN  calamine/zinc oxide Lotion 1 Application(s) Topical daily PRN  furosemide    Tablet 40 milliGRAM(s) Oral daily  heparin   Injectable 5000 Unit(s) SubCutaneous every 8 hours  polyethylene glycol 3350 17 Gram(s) Oral daily  prednisoLONE    3 mG/mL Solution (ORAPRED) 40 milliGRAM(s) Oral daily  spironolactone 100 milliGRAM(s) Oral daily      ROS: All system reviewed and negative except as mentioned above.    PHYSICAL EXAM:   Vital Signs:  Vital Signs Last 24 Hrs  T(C): 36.7 (25 Jul 2023 08:44), Max: 36.9 (24 Jul 2023 12:27)  T(F): 98.1 (25 Jul 2023 08:44), Max: 98.5 (25 Jul 2023 04:38)  HR: 86 (25 Jul 2023 08:44) (67 - 93)  BP: 142/82 (25 Jul 2023 08:44) (122/79 - 149/85)  BP(mean): --  RR: 18 (25 Jul 2023 08:44) (17 - 18)  SpO2: 97% (25 Jul 2023 08:44) (97% - 100%)    Parameters below as of 25 Jul 2023 08:44  Patient On (Oxygen Delivery Method): room air      Daily     Daily     GENERAL:  NAD, Appears stated age  HEENT:  NC/AT,  conjunctivae clear and pink, sclera icteric  CHEST:  Normal Effort, Breath sounds clear  HEART:  RRR, S1 + S2, no murmurs  ABDOMEN:  +ascites w/ fluid wave, not tense  EXTREMITIES:  no cyanosis, 1+ edema improved from prior  SKIN:  Warm & Dry. No rash or erythema  NEURO:  Alert, oriented, no focal deficit, no asterixis    LABS:                        11.7   7.71  )-----------( 60       ( 25 Jul 2023 06:32 )             35.0     Mean Cell Volume: 106.7 fL (07-25-23 @ 06:32)    07-25    133<L>  |  99  |  12  ----------------------------<  109<H>  3.7   |  25  |  0.63    Ca    8.5      25 Jul 2023 06:32  Phos  3.7     07-25  Mg     1.80     07-25    TPro  6.8  /  Alb  3.1<L>  /  TBili  3.5<H>  /  DBili  x   /  AST  50<H>  /  ALT  23  /  AlkPhos  217<H>  07-25    LIVER FUNCTIONS - ( 25 Jul 2023 06:32 )  Alb: 3.1 g/dL / Pro: 6.8 g/dL / ALK PHOS: 217 U/L / ALT: 23 U/L / AST: 50 U/L / GGT: x           PT/INR - ( 25 Jul 2023 06:32 )   PT: 17.4 sec;   INR: 1.49 ratio         PTT - ( 25 Jul 2023 06:32 )  PTT:37.5 sec  Urinalysis Basic - ( 25 Jul 2023 06:32 )    Color: x / Appearance: x / SG: x / pH: x  Gluc: 109 mg/dL / Ketone: x  / Bili: x / Urobili: x   Blood: x / Protein: x / Nitrite: x   Leuk Esterase: x / RBC: x / WBC x   Sq Epi: x / Non Sq Epi: x / Bacteria: x                              11.7   7.71  )-----------( 60       ( 25 Jul 2023 06:32 )             35.0                         11.7   8.27  )-----------( 62       ( 24 Jul 2023 05:19 )             34.9                         11.4   9.86  )-----------( 59       ( 23 Jul 2023 05:50 )             33.8                         12.1   7.58  )-----------( 55       ( 22 Jul 2023 10:35 )             35.8       Imaging: Images reviewed.

## 2023-07-25 NOTE — PROGRESS NOTE ADULT - ATTENDING COMMENTS
57M, AUD, obesity BMI 40.8, adm. with abdom. distension and edema, found cirrhosis and alcoholic hepatitis with very large amount of ascites, +C. diff in ED without diarrhea.      - alc cirrhosis and hepatitis, MELD-Na 20, Maddrey 27.5 on steroid      - prednisolone 7/21-; Lille day 4 < 0.45, i.e. better prognosis      - ascites: still tense after LVP 7/20 16L, no SBP, protein 2.4; on fursemide/spironolactone 40/100, got another Lasix 40 mg yesterday      - leg edema 2+, mildly improved      - HE? 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         Recently drank 2 drinks daily after work, in past "like a fish". Denies h/o withdrawal symptoms.    - MARTA 1:320    Recommendations:  - continue prednisolone for 28 days, then stop  - ASMA, quantitative IgG  - furosemide/spironolactone 40bid/100bid, can give albumin 25%, 100 mL x2  - daily weight  - discharge soon, diuretics depending on improvement of volume overload and timing of follow-up appointment.

## 2023-07-25 NOTE — DIETITIAN INITIAL EVALUATION ADULT - ORAL INTAKE PTA/DIET HISTORY
Patient reports his appetite has been too good, consumes 2 meals daily(Bk and Dinner). Pt with PMHx of ETOH abuse likely pt PO intake didn't meet his nutritional needs.   Denies any diet followed PTA. Pt endorses weight gain. Pt reported -220lbs.   Patient reports his appetite has been too good, consumes 2 meals daily(Bk and Dinner). Denies any diet followed PTA. Pt endorses weight gain. Pt reported -220lbs.

## 2023-07-25 NOTE — DIETITIAN INITIAL EVALUATION ADULT - PROBLEM SELECTOR PLAN 3
Pt reporting 5 episodes of watery, nonbloody diarrhea that started Tuesday morning. Unclear etiology.     -C diff PCR, GI stool PCR, and stool cxs ordered  -f/u CTAP with IV contrast  -monitor stool count  -monitor electrolytes and replete PRN  -isolation precautions pending C diff

## 2023-07-25 NOTE — PROGRESS NOTE ADULT - PROBLEM SELECTOR PLAN 1
Pt with AUD p/w 1.5 weeks of increasing abdominal distention and b/l LE edema. Ascites and cirrhosis confirmed on US. s/p paracentesis with 16L removed. Patient feeling much better after having fluid removed. Peritoneal fluid showing 157 nucleated cell count r/o SBP.  Fluid gram stain w/ PML, no organisms. SAAG 1.8 - in setting of unremarkable TTE, ascites 2/2 portal hypertension, likely alcoholic cirrhosis. CT abd/pelvis w/ nodular liver. No evidence of PVT/Budd Chiari syndrome. MELD 22, elevated MDF despite steroids.  -GI/hepatology consulted appreciated recs  -Will give additional IV albumin 25% 100mg 7/24  -Plan for repeat tap 7/25  -c/w Lasix 40mg PO, spironolactone 100mg PO (will give additional dose 7/25 of both per hepatology recs)  -c/w Miralax daily  -c/w Lactulose 20g BID, goal 3 BM per day, uptitrate as needed  -EGD/transplant screen as outpatient Pt with AUD p/w 1.5 weeks of increasing abdominal distention and b/l LE edema. Ascites and cirrhosis confirmed on US. s/p paracentesis with 16L removed. Patient feeling much better after having fluid removed. Peritoneal fluid showing 157 nucleated cell count r/o SBP.  Fluid gram stain w/ PML, no organisms. SAAG 1.8 - in setting of unremarkable TTE, ascites 2/2 portal hypertension, likely alcoholic cirrhosis. CT abd/pelvis w/ nodular liver. No evidence of PVT/Budd Chiari syndrome. As of 7/25 his abdomen is more firm and distended, will assess for reaccumulation on US.  -GI/hepatology consulted appreciated recs  -Will give additional IV albumin 25% 100mg 7/24  -Repeat abd US to evaluate for ascites, possible tap 7/26  -c/w Lasix 40mg PO BID, spironolactone 100mg PO BID  -c/w Miralax daily  -low suspicion for HE, will stop Lactulose 20g BID  -EGD/transplant screen as outpatient Pt with AUD p/w 1.5 weeks of increasing abdominal distention and b/l LE edema. Ascites and cirrhosis confirmed on US. s/p paracentesis with 16L removed. Patient feeling much better after having fluid removed. Peritoneal fluid showing 157 nucleated cell count r/o SBP.  Fluid gram stain w/ PML, no organisms. SAAG 1.8 - in setting of unremarkable TTE, ascites 2/2 portal hypertension, likely alcoholic cirrhosis. CT abd/pelvis w/ nodular liver. No evidence of PVT/Budd Chiari syndrome. As of 7/25 his abdomen is more firm and distended, will assess for reaccumulation on US.  -GI/hepatology consulted appreciated recs  -s/p IV albumin 25% 100mg  -re-accumulation on abd US 7/25, procedure team not recommending paracentesis at this time as pt is asymptomatic  -c/w Lasix 40mg PO BID, spironolactone 100mg PO BID  -c/w Miralax daily  -low suspicion for HE, will stop Lactulose 20g BID  -EGD/transplant screen as outpatient  -Will give HAV vaccine as inpatient

## 2023-07-25 NOTE — PROGRESS NOTE ADULT - PROBLEM SELECTOR PLAN 5
Pt w/ recent diarrhea, no N/V, likely in setting alcohol use and liver dysfunction. no recent abx use. Patient not having diarrhea on admission. C. diff positive however low suspicion for active infection at this time. GI pcr negative. ID consulted not recommending abx  -f/u stool culture NGTD  -monitor stool count  -on isolation precautions, can d/c 7/21 if no diarrhea Pt w/ recent diarrhea, no N/V, likely in setting alcohol use and liver dysfunction. no recent abx use. Patient not having diarrhea on admission. C. diff positive however low suspicion for active infection at this time. GI pcr and stool culture negative. ID consulted not recommending abx  -monitor stool count

## 2023-07-25 NOTE — DIETITIAN INITIAL EVALUATION ADULT - PROBLEM SELECTOR PLAN 8
UA with positive nitrite and small LE. Pt, however, with no urinary complaints.    -monitor off antibiotics for now

## 2023-07-26 LAB
ALBUMIN SERPL ELPH-MCNC: 3.2 G/DL — LOW (ref 3.3–5)
ALP SERPL-CCNC: 217 U/L — HIGH (ref 40–120)
ALT FLD-CCNC: 27 U/L — SIGNIFICANT CHANGE UP (ref 4–41)
ANION GAP SERPL CALC-SCNC: 12 MMOL/L — SIGNIFICANT CHANGE UP (ref 7–14)
APTT BLD: 42.1 SEC — HIGH (ref 24.5–35.6)
AST SERPL-CCNC: 54 U/L — HIGH (ref 4–40)
BILIRUB SERPL-MCNC: 3.8 MG/DL — HIGH (ref 0.2–1.2)
BUN SERPL-MCNC: 14 MG/DL — SIGNIFICANT CHANGE UP (ref 7–23)
CALCIUM SERPL-MCNC: 9.1 MG/DL — SIGNIFICANT CHANGE UP (ref 8.4–10.5)
CHLORIDE SERPL-SCNC: 96 MMOL/L — LOW (ref 98–107)
CO2 SERPL-SCNC: 27 MMOL/L — SIGNIFICANT CHANGE UP (ref 22–31)
CREAT SERPL-MCNC: 0.72 MG/DL — SIGNIFICANT CHANGE UP (ref 0.5–1.3)
EGFR: 107 ML/MIN/1.73M2 — SIGNIFICANT CHANGE UP
GLUCOSE SERPL-MCNC: 93 MG/DL — SIGNIFICANT CHANGE UP (ref 70–99)
HCT VFR BLD CALC: 35.8 % — LOW (ref 39–50)
HGB BLD-MCNC: 12.1 G/DL — LOW (ref 13–17)
INR BLD: 1.45 RATIO — HIGH (ref 0.85–1.18)
MAGNESIUM SERPL-MCNC: 1.5 MG/DL — LOW (ref 1.6–2.6)
MCHC RBC-ENTMCNC: 33.8 GM/DL — SIGNIFICANT CHANGE UP (ref 32–36)
MCHC RBC-ENTMCNC: 35.6 PG — HIGH (ref 27–34)
MCV RBC AUTO: 105.3 FL — HIGH (ref 80–100)
NRBC # BLD: 0 /100 WBCS — SIGNIFICANT CHANGE UP (ref 0–0)
NRBC # FLD: 0 K/UL — SIGNIFICANT CHANGE UP (ref 0–0)
PHOSPHATE SERPL-MCNC: 4 MG/DL — SIGNIFICANT CHANGE UP (ref 2.5–4.5)
PLATELET # BLD AUTO: 73 K/UL — LOW (ref 150–400)
POTASSIUM SERPL-MCNC: 4 MMOL/L — SIGNIFICANT CHANGE UP (ref 3.5–5.3)
POTASSIUM SERPL-SCNC: 4 MMOL/L — SIGNIFICANT CHANGE UP (ref 3.5–5.3)
PROT SERPL-MCNC: 7.4 G/DL — SIGNIFICANT CHANGE UP (ref 6–8.3)
PROTHROM AB SERPL-ACNC: 16.2 SEC — HIGH (ref 9.5–13)
RBC # BLD: 3.4 M/UL — LOW (ref 4.2–5.8)
RBC # FLD: 15.4 % — HIGH (ref 10.3–14.5)
SODIUM SERPL-SCNC: 135 MMOL/L — SIGNIFICANT CHANGE UP (ref 135–145)
WBC # BLD: 8.58 K/UL — SIGNIFICANT CHANGE UP (ref 3.8–10.5)
WBC # FLD AUTO: 8.58 K/UL — SIGNIFICANT CHANGE UP (ref 3.8–10.5)

## 2023-07-26 PROCEDURE — 99232 SBSQ HOSP IP/OBS MODERATE 35: CPT | Mod: GC

## 2023-07-26 RX ORDER — MAGNESIUM SULFATE 500 MG/ML
2 VIAL (ML) INJECTION ONCE
Refills: 0 | Status: COMPLETED | OUTPATIENT
Start: 2023-07-26 | End: 2023-07-26

## 2023-07-26 RX ORDER — ALBUMIN HUMAN 25 %
100 VIAL (ML) INTRAVENOUS ONCE
Refills: 0 | Status: COMPLETED | OUTPATIENT
Start: 2023-07-26 | End: 2023-07-26

## 2023-07-26 RX ADMIN — SPIRONOLACTONE 100 MILLIGRAM(S): 25 TABLET, FILM COATED ORAL at 05:01

## 2023-07-26 RX ADMIN — SPIRONOLACTONE 100 MILLIGRAM(S): 25 TABLET, FILM COATED ORAL at 17:36

## 2023-07-26 RX ADMIN — Medication 40 MILLIGRAM(S): at 17:35

## 2023-07-26 RX ADMIN — Medication 50 MILLILITER(S): at 13:47

## 2023-07-26 RX ADMIN — Medication 40 MILLIGRAM(S): at 05:01

## 2023-07-26 RX ADMIN — Medication 40 MILLIGRAM(S): at 05:02

## 2023-07-26 RX ADMIN — HEPARIN SODIUM 5000 UNIT(S): 5000 INJECTION INTRAVENOUS; SUBCUTANEOUS at 05:02

## 2023-07-26 RX ADMIN — Medication 25 GRAM(S): at 09:45

## 2023-07-26 RX ADMIN — HEPARIN SODIUM 5000 UNIT(S): 5000 INJECTION INTRAVENOUS; SUBCUTANEOUS at 21:27

## 2023-07-26 RX ADMIN — POLYETHYLENE GLYCOL 3350 17 GRAM(S): 17 POWDER, FOR SOLUTION ORAL at 13:46

## 2023-07-26 NOTE — PROGRESS NOTE ADULT - ATTENDING COMMENTS
57M with PMH of AUD, ?prior DVT no longer on AC who is presenting with increasing abdominal distention and b/l LE edema secondary to acute decompensated alcoholic cirrhosis.     # AUD  # Alcoholic Hepatitis   # Acute decompensated alcoholic cirrhosis   - no h/o withdrawals, s/p CIWA protocol  - s/p paracentesis 7/20 with 16L removed; fluid analysis not consistent with SBP. f/u culture   - Hepatology following - s/p NAC protocol, d/c ursodiol and cholestyramine; initiated steroid on 7/21 - plan for 21 days treatment pending Lille score    - Screening EGD as outpatient     # + C diff - given that patient is having formed stool, suspect this is likely secondary to colonization, no need for ABX at this time. Appreciate ID recs.   # Anemia, Thrombocytopenia, Coagulopathy  - in setting of cirrhosis, continue to monitor. 57M with PMH of AUD, ?prior DVT no longer on AC who is presenting with increasing abdominal distention and b/l LE edema secondary to acute decompensated alcoholic cirrhosis.     # AUD  # Alcoholic Hepatitis   # Acute decompensated alcoholic cirrhosis   - no h/o withdrawals, s/p CIWA protocol  - s/p paracentesis 7/20 with 16L removed; fluid analysis not consistent with SBP. f/u culture   - Hepatology following - s/p NAC protocol, d/c ursodiol and cholestyramine; initiated steroid on 7/21 - plan for 21 days treatment pending Lille score    - Screening EGD as outpatient     # + C diff - given that patient is having formed stool, suspect this is likely secondary to colonization, no need for ABX at this time. Appreciate ID recs.   # Anemia, Thrombocytopenia, Coagulopathy  - in setting of cirrhosis, continue to monitor

## 2023-07-26 NOTE — PROGRESS NOTE ADULT - ATTENDING COMMENTS
- alc hepatitis and cirrhosis; new - Lille responder. Continue prednisolone 40 mg/d unti 7/18  - severe ascites s/p LVP 13L, still/again very large - likely >10L, edema improved to 2+; continue furosemide/spironolactone 40bid/100bid  - f/u with hepatology within 5 days - alc hepatitis and cirrhosis; new - Lille responder. Continue prednisolone 40 mg/d unti 7/18  - severe ascites s/p LVP 13L, still/again very large - likely >10L, edema improved to 2+; continue furosemide/spironolactone 40bid/100bid  - pt. 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  - f/u with hepatology within 5 days

## 2023-07-26 NOTE — PROGRESS NOTE ADULT - PROBLEM SELECTOR PLAN 9
sodium 135 > 133 > 131 > 134 > 133 (7/25). likely in setting of diuresis, could also have underlying SIADH.  -monitor sodium sodium 135 > 133 > 131 > 134 > 133 > 135 (7/26). likely in setting of diuresis, could also have underlying SIADH.  -monitor sodium

## 2023-07-26 NOTE — PROGRESS NOTE ADULT - PROBLEM SELECTOR PLAN 7
Plt count 58>44>52>50 > 59 > 62 (7/24). Also with elevated PT/PTT, elevated D-dimer, decreased fibrinogen on admission. Suspect coagulopathies from bone marrow suppression in setting of chronic alcohol use and possibly from liver failure. Low suspicion for TTP given lack of fevers, renal failure, and AMS. DIC also considered but unlikely given normal haptoglobin. PT/PTT/INR downtrending as of 7/24  -monitor plt  -transfuse <10, <20k and febrile, and <50k w/ active bleeding Plt count 58>44>52>50 > 59 > 62 > 60 > 73 (7/26). Also with elevated PT/PTT, elevated D-dimer, decreased fibrinogen on admission. Suspect coagulopathies from bone marrow suppression in setting of chronic alcohol use and possibly from liver failure. Low suspicion for TTP given lack of fevers, renal failure, and AMS. DIC also considered but unlikely given normal haptoglobin. PT/PTT/INR downtrending as of 7/24  -monitor plt  -transfuse <10, <20k and febrile, and <50k w/ active bleeding Plt count 58>44>52>50 > 59 > 62 > 60 > 73 (7/26). Also with elevated PT/PTT, elevated D-dimer, decreased fibrinogen on admission. Suspect coagulopathies from bone marrow suppression in setting of chronic alcohol use and possibly from liver failure. Low suspicion for TTP given lack of fevers, renal failure, and AMS. DIC also considered but unlikely given normal haptoglobin. PT/PTT/INR downtrending  -monitor plt  -transfuse <10, <20k and febrile, and <50k w/ active bleeding

## 2023-07-26 NOTE — PROGRESS NOTE ADULT - SUBJECTIVE AND OBJECTIVE BOX
Emerson Ocasio MD  PGY 1 Department of Internal Medicine        Patient is a 57y old  Male who presents with a chief complaint of Abdominal distention (26 Jul 2023 07:15)      SUBJECTIVE / OVERNIGHT EVENTS: Pt seen and examined. No acute overnight events. Denies fevers, chills, CP, SOB, Abdominal pain, N/V, Constipation, Diarrhea        MEDICATIONS  (STANDING):  albumin human 25% IVPB 100 milliLiter(s) IV Intermittent once  furosemide    Tablet 40 milliGRAM(s) Oral every 12 hours  heparin   Injectable 5000 Unit(s) SubCutaneous every 8 hours  hepatitis A (virus) Vaccine - Adult (HAVRIX) 1 milliLiter(s) IntraMuscular once  magnesium sulfate  IVPB 2 Gram(s) IV Intermittent once  polyethylene glycol 3350 17 Gram(s) Oral daily  prednisoLONE    3 mG/mL Solution (ORAPRED) 40 milliGRAM(s) Oral daily  spironolactone 100 milliGRAM(s) Oral two times a day    MEDICATIONS  (PRN):  acetaminophen     Tablet .. 650 milliGRAM(s) Oral every 6 hours PRN Temp greater or equal to 38C (100.4F), Mild Pain (1 - 3)  calamine/zinc oxide Lotion 1 Application(s) Topical daily PRN Rash and/or Itching      I&O's Summary      Vital Signs Last 24 Hrs  T(C): 36.6 (26 Jul 2023 05:08), Max: 36.9 (25 Jul 2023 20:32)  T(F): 97.8 (26 Jul 2023 05:08), Max: 98.5 (25 Jul 2023 20:32)  HR: 76 (26 Jul 2023 05:08) (76 - 85)  BP: 112/63 (26 Jul 2023 05:08) (112/63 - 139/80)  BP(mean): --  RR: 18 (26 Jul 2023 05:08) (18 - 18)  SpO2: 99% (26 Jul 2023 05:08) (96% - 99%)    Parameters below as of 26 Jul 2023 05:08  Patient On (Oxygen Delivery Method): room air        CAPILLARY BLOOD GLUCOSE          PHYSICAL EXAM:  GENERAL: NAD,  resting comfortably  HEAD:  Atraumatic, Normocephalic  EYES: EOMI, PERRL, conjunctiva nl, scleral icterus bilaterally  NECK: No JVD  CHEST/LUNG: Clear to auscultation bilaterally; No wheeze  HEART: Regular rate and rhythm; No murmurs, rubs, or gallops  ABDOMEN: Abdomen distended and firm, unchanged from yesterday. Nontender. Reducible umbilical hernia; Bowel sounds present.   EXTREMITIES:  2+ Peripheral Pulses, 2+ pitting edema to lower extremities, No clubbing, cyanosis. Calves are non tender and without erythema or warmth.  PSYCH: AAOx3  NEUROLOGY: non-focal, moving all extremities, cranial nerves intact  SKIN: Multiple areas of excoriations to extremities with scabbing  lesions          LABS:                        12.1   8.58  )-----------( 73       ( 26 Jul 2023 06:36 )             35.8     Auto Eosinophil # x     / Auto Eosinophil % x     / Auto Neutrophil # x     / Auto Neutrophil % x     / BANDS % x                            11.7   7.71  )-----------( 60       ( 25 Jul 2023 06:32 )             35.0     Auto Eosinophil # x     / Auto Eosinophil % x     / Auto Neutrophil # x     / Auto Neutrophil % x     / BANDS % x        07-26    135  |  96<L>  |  14  ----------------------------<  93  4.0   |  27  |  0.72  07-25    133<L>  |  99  |  12  ----------------------------<  109<H>  3.7   |  25  |  0.63    Ca    9.1      26 Jul 2023 06:36  Mg     1.50     07-26  Phos  4.0     07-26  TPro  7.4  /  Alb  3.2<L>  /  TBili  3.8<H>  /  DBili  x   /  AST  54<H>  /  ALT  27  /  AlkPhos  217<H>  07-26  TPro  6.8  /  Alb  3.1<L>  /  TBili  3.5<H>  /  DBili  x   /  AST  50<H>  /  ALT  23  /  AlkPhos  217<H>  07-25    PT/INR - ( 26 Jul 2023 06:36 )   PT: 16.2 sec;   INR: 1.45 ratio         PTT - ( 26 Jul 2023 06:36 )  PTT:42.1 sec      Urinalysis Basic - ( 26 Jul 2023 06:36 )    Color: x / Appearance: x / SG: x / pH: x  Gluc: 93 mg/dL / Ketone: x  / Bili: x / Urobili: x   Blood: x / Protein: x / Nitrite: x   Leuk Esterase: x / RBC: x / WBC x   Sq Epi: x / Non Sq Epi: x / Bacteria: x      Lactate, Blood: 1.4 mmol/L (07-20 @ 04:25)        RADIOLOGY & ADDITIONAL TESTS:    Imaging Personally Reviewed:    Consultant(s) Notes Reviewed:      Care Discussed with Consultants/Other Providers:

## 2023-07-26 NOTE — PROGRESS NOTE ADULT - PROBLEM SELECTOR PLAN 5
Pt w/ recent diarrhea, no N/V, likely in setting alcohol use and liver dysfunction. no recent abx use. Patient not having diarrhea on admission. C. diff positive however low suspicion for active infection at this time. GI pcr and stool culture negative. ID consulted not recommending abx  -monitor stool count

## 2023-07-26 NOTE — CHART NOTE - NSCHARTNOTEFT_GEN_A_CORE
Invasive Procedure Team (IPT) consulted for repeat therapeutic paracentesis for discharge optimization.    Indications for Diagnostic Paracentesis:  - Consistent with specialty society guidelines, ruling out Bacterial Peritonitis (in particular, SBP) in any admitted patient with ascites (even if asymptomatic), particularly in those with c/f sepsis  - Identifying etiology for new-onset ascites    Indications for Therapeutic Paracentesis:  - Tense ascites associated with abdominal discomfort, urinary retention, respiratory distress, or other evidence of end-organ compromise that is likely 2/2 the ascites itself (and not another etiology)  - Large ascites refractory to medical management (i.e. diuretics)    Absolute Contraindications:  - DIC  - Acute Abdomen  - Cellulitis    Relative Contraindications:  - Coagulopathy (INR > 2.0 per [facility] IPT policy, but specific to performing proceduralist; refer to final recommendations below)  - Severe Thrombocytopenia (Platelet < 20-50, discuss with team/refer to final recommendations below)  - Pregnancy (discuss with team/refer to final recommendations below)  - Cellulitis/Surgical Scarring/Vasculature or Hematoma overlying only safe bedside access site  - MARISOL (specifically for therapeutic paracenteses; related to already poor intravascular perfusion. Risk of MARISOL increases by 1.5x per 1L of ascites fluid removed).    ASSESSMENT/RECOMMENDATIONS  57M with AUD c/b EtOH Cirrhosis for whom IPT re-consulted for repeat therapeutic paracentesis for optimization prior to discharge. Last therapeutic paracentesis 7/20. Pt reassessed yesterday and determined to have accessible pocket but w/o indication for urgent therapeutic paracentesis, particularly < 7 days prior. Indication now for discharge optimization.   - Please obtain CBC/PTT/PT/INR/BMP for Cr on 7/27am  - Please order any requested diagnostic studies on morning of procedure & notify procedure team if cytology requested.  - Primary team will have to physically deliver any samples to lab; cannot be sent through pneumatic tube  - Can generally continue DVT/VTE PPx  - Pt does NOT need to be NPO  - Plan for procedure 7/27am  - If repeat therapeutic paracentesis requested for severe / refractory ascites in < 7 day intervals, should consider IR consultation for indwelling catheter placement.    Skyler Burns MD PGY-3  Case D/W Dr. Guzman

## 2023-07-26 NOTE — PROGRESS NOTE ADULT - PROBLEM SELECTOR PLAN 8
on exam has multiple areas of excoriation and scabbing to all extremities. Pt reports he has intermittent diffuse skin pruritis, not experiencing at this time. Has tried multiple OTC creams to no effect. Pruritis likely 2/2 elevated bilirubin and liver dysfunction.  -stop cholestyramine   -c/w calamine lotion  -monitor on exam has multiple areas of excoriation and scabbing to all extremities. Pt reports he has intermittent diffuse skin pruritis, not experiencing at this time. Has tried multiple OTC creams to no effect. Pruritis likely 2/2 elevated bilirubin and liver dysfunction.  -c/w calamine lotion  -monitor

## 2023-07-26 NOTE — PROGRESS NOTE ADULT - PROBLEM SELECTOR PLAN 2
Pt with elevated t bili to 9.0, d bili 4.5, alk phos 388, AST 91, and ALT 18. Elevated GGT suggestive of cholestatic disease. Concerning for cholestasis/biliary obstruction with component of hepatocellular injury/hepatitis 2/2 alcohol use. As of 7/25, LFTs downtrending, downtrending bili of 3.5.  -appreciate GI/Hepatology recs  -trend LFTs  -s/p loading dose NAC 150mg/kg/day. c/w maintenance dose 100mg/kg/day until 7/24  -s/p ursodiol 500mg BID  -c/w prednisolone 40mg daily  -f/u bcx, NGTD  -Hep A immunization as outpatient Pt with elevated t bili to 9.0, d bili 4.5, alk phos 388, AST 91, and ALT 18. Elevated GGT suggestive of cholestatic disease. Concerning for cholestasis/biliary obstruction with component of hepatocellular injury/hepatitis 2/2 alcohol use.  -appreciate GI/Hepatology recs  -trend LFTs  -s/p loading dose NAC 150mg/kg/day. c/w maintenance dose 100mg/kg/day until 7/24  -s/p ursodiol 500mg BID  -c/w prednisolone 40mg daily, will continue as outpatient for total 28 days  -f/u bcx, NGTD

## 2023-07-26 NOTE — PROGRESS NOTE ADULT - PROBLEM SELECTOR PLAN 11
as of 7/25, mag 1.8, phos 3.7, potassium 3.7. Likely in setting of chronic alcohol use.   -Goal Mag 2.0, Phos 3.0, Potassium 4.0  -will replete as needed as of 7/26, mag 1.5, phos 4.0, potassium 4.0. Likely in setting of chronic alcohol use.   -Goal Mag 2.0, Phos 3.0, Potassium 4.0  -will replete as needed

## 2023-07-26 NOTE — PROGRESS NOTE ADULT - PROBLEM SELECTOR PLAN 1
Pt with AUD p/w 1.5 weeks of increasing abdominal distention and b/l LE edema. Ascites and cirrhosis confirmed on US. s/p paracentesis with 16L removed. Patient feeling much better after having fluid removed. Peritoneal fluid showing 157 nucleated cell count r/o SBP.  Fluid gram stain w/ PML, no organisms. SAAG 1.8 - in setting of unremarkable TTE, ascites 2/2 portal hypertension, likely alcoholic cirrhosis. CT abd/pelvis w/ nodular liver. No evidence of PVT/Budd Chiari syndrome. As of 7/25 his abdomen is more firm and distended, will assess for reaccumulation on US.  -GI/hepatology consulted appreciated recs  -s/p IV albumin 25% 100mg  -re-accumulation on abd US 7/25, procedure team not recommending paracentesis at this time as pt is asymptomatic  -c/w Lasix 40mg PO BID, spironolactone 100mg PO BID  -c/w Miralax daily  -low suspicion for HE, will stop Lactulose 20g BID  -EGD/transplant screen as outpatient  -Will give HAV vaccine as inpatient Pt with AUD p/w 1.5 weeks of increasing abdominal distention and b/l LE edema. Ascites and cirrhosis confirmed on US. s/p paracentesis with 16L removed. Patient feeling much better after having fluid removed. Peritoneal fluid showing 157 nucleated cell count r/o SBP.  Fluid gram stain w/ PML, no organisms. SAAG 1.8 - in setting of unremarkable TTE, ascites 2/2 portal hypertension, likely alcoholic cirrhosis. CT abd/pelvis w/ nodular liver. No evidence of PVT/Budd Chiari syndrome. As of 7/25 his abdomen is more firm and distended, will assess for reaccumulation on US.  -GI/hepatology consulted appreciated recs  -s/p IV albumin 25% 100mg x3  -re-accumulation on abd US 7/25, will reach out to procedure team to discuss repeating tap 7/27  -c/w Lasix 40mg PO BID, spironolactone 100mg PO BID  -c/w Miralax daily  -EGD/transplant screen as outpatient Pt with AUD p/w 1.5 weeks of increasing abdominal distention and b/l LE edema. Ascites and cirrhosis confirmed on US. s/p paracentesis with 16L removed. Patient feeling much better after having fluid removed. Peritoneal fluid showing 157 nucleated cell count r/o SBP.  Fluid gram stain w/ PML, no organisms. SAAG 1.8 - in setting of unremarkable TTE, ascites 2/2 portal hypertension, likely alcoholic cirrhosis. CT abd/pelvis w/ nodular liver. No evidence of PVT/Budd Chiari syndrome. As of 7/25 his abdomen is more firm and distended, will assess for reaccumulation on US.  -GI/hepatology consulted appreciated recs  -s/p IV albumin 25% 100mg x3  -re-accumulation on abd US 7/25, procedure team consulted will repeat tap 7/27  -c/w Lasix 40mg PO BID, spironolactone 100mg PO BID  -c/w Miralax daily  -EGD/transplant screen as outpatient

## 2023-07-26 NOTE — PROGRESS NOTE ADULT - ASSESSMENT
56yo w/ PMHx AUD, remote hx DVT (not on AC, ?if provoked) presenting w/ jaundice and ascites in setting heavy alcohol use c/f alcoholic hepatitis and cirrhosis.    #Alcoholic hepatitis: Clinically improving on steroids (7/21-) w/ Maddrey 27.4--> --> 49--> 27 --> 21.9 -->16.1 today. Per ID, C. diff likely colonization. Lille 0.157  # Cirrhosis -  E/o cirrhosis on CT scan, most likely 2/2 AUD. MARTA 1:320 though neg SMAb.  Fluid studies c/w portal HTN. No e/o PVT on U/S. TTE WNL. Clinically is volume up w/o asterixis. Will need screening EGD. Still w/ significant ascites on exam despite adequate diuresis based on Sheila  CT A/P IVC 7/21: Cirrhosis and portal hypertension with moderate abdominopelvic ascites. No portal thrombosis. Portal and hepatic veins are patent.  MELD 20 (7/20)-->23 (7/21)-->20 (7/24) --> 20 (7/25)--> 19 (7/26), B+  Ascites: s/p 16 L removed 7/20, on diuretics as below, pending possible repeat LVP prior to d/c  EV: no prior EGD  HCC: No e/o HCC on CT  HAV IgM/IgG negative, would need immunization  #AUD: Pt feels he can quit easily and not currently interested in pharmacological assistance but may benefit from discussion on rehab/group programs. 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     Recommendations  - c/w prednisolone 40mg daily given MDF>32 and no signs of active infection- based on Lille, can continue for 28 days  -C/w lasix 40mg BID and aldactone 100mg BID- would trend cr and will need close hepatology f/u  -daily weights  -Can give HAV vaccine while inpatient if available through pharmacy   -Please obtain daily weights  -trend CBC, CMP, INR  - etoh cessation- discussed today and not interested in medications at this time  -Clinical monitoring for HE- likely does not need lactulose currently unless asterixis or worsening mental status  -Will need screening EGD- will discuss inpatient vs. outpt  -Ongoing eval for OLT- likely to be continued as an outpt     Hepatology will continue to follow.     All recommendations are tentative until note is attested by attending.     Monica Hale  GI/Hepatology Fellow PGY5    NON-URGENT CONSULTS:  Please email [email or web address]  AT NIGHT AND ON WEEKENDS:  Available on [employer] Teams  [phone] (Long Range Pager)    After 5pm, please contact the on-call GI fellow. [phone] 56yo w/ PMHx AUD, remote hx DVT (not on AC, ?if provoked) presenting w/ jaundice and ascites in setting heavy alcohol use c/f alcoholic hepatitis and cirrhosis.    #Alcoholic hepatitis: Clinically improving on steroids (7/21-) w/ Maddrey 27.4--> --> 49--> 27 --> 21.9 -->16.1 today. Per ID, C. diff likely colonization. Lille 0.157  # Cirrhosis -  E/o cirrhosis on CT scan, most likely 2/2 AUD. MARTA 1:320 though neg SMAb.  Fluid studies c/w portal HTN. No e/o PVT on U/S. TTE WNL. Clinically is volume up w/o asterixis. Will need screening EGD. Still w/ significant ascites on exam despite adequate diuresis based on Sheila  CT A/P IVC 7/21: Cirrhosis and portal hypertension with moderate abdominopelvic ascites. No portal thrombosis. Portal and hepatic veins are patent.  MELD 20 (7/20)-->23 (7/21)-->20 (7/24) --> 20 (7/25)--> 19 (7/26), B+  Ascites: s/p 16 L removed 7/20, on diuretics as below, pending possible repeat LVP prior to d/c  EV: no prior EGD  HCC: No e/o HCC on CT  HAV IgM/IgG negative, would need immunization  #AUD: Pt feels he can quit easily and not currently interested in pharmacological assistance but may benefit from discussion on rehab/group programs. 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     Recommendations  - c/w prednisolone 40mg daily given MDF>32 and no signs of active infection- based on Lille,  continue for 28 days until 8/17  -C/w lasix 40mg BID and aldactone 100mg BID- would trend cr and will need close hepatology f/u  -daily weights  -Can give HAV vaccine while inpatient if available through pharmacy   -Please obtain daily weights  -trend CBC, CMP, INR  - etoh cessation- discussed today and not interested in medications at this time  -Clinical monitoring for HE- likely does not need lactulose currently unless asterixis or worsening mental status  -Will need screening EGD- will discuss inpatient vs. outpt  -Ongoing eval for OLT- likely to be continued as an outpt     Hepatology will continue to follow.     All recommendations are tentative until note is attested by attending.     Monica Hale  GI/Hepatology Fellow PGY5    NON-URGENT CONSULTS:  Please email [email or web address]  AT NIGHT AND ON WEEKENDS:  Available on [employer] Teams  [phone] (Long Range Pager)    After 5pm, please contact the on-call GI fellow. [phone]

## 2023-07-26 NOTE — PROGRESS NOTE ADULT - SUBJECTIVE AND OBJECTIVE BOX
Interval Events:   -Feeling well today; no concerns  -Primary team pending possible d/c tomorrow  -MELD 17, MDF 16.1    Hospital Medications:  acetaminophen     Tablet .. 650 milliGRAM(s) Oral every 6 hours PRN  calamine/zinc oxide Lotion 1 Application(s) Topical daily PRN  furosemide    Tablet 40 milliGRAM(s) Oral every 12 hours  heparin   Injectable 5000 Unit(s) SubCutaneous every 8 hours  hepatitis A (virus) Vaccine - Adult (HAVRIX) 1 milliLiter(s) IntraMuscular once  polyethylene glycol 3350 17 Gram(s) Oral daily  prednisoLONE    3 mG/mL Solution (ORAPRED) 40 milliGRAM(s) Oral daily  spironolactone 100 milliGRAM(s) Oral two times a day      ROS: All system reviewed and negative except as mentioned above.    PHYSICAL EXAM:   Vital Signs:  Vital Signs Last 24 Hrs  T(C): 36.7 (2023 15:25), Max: 36.9 (2023 20:32)  T(F): 98.1 (2023 15:25), Max: 98.5 (2023 20:32)  HR: 71 (2023 15:25) (71 - 86)  BP: 112/59 (2023 15:25) (112/59 - 139/80)  BP(mean): --  RR: 18 (2023 13:00) (18 - 18)  SpO2: 97% (2023 15:25) (96% - 99%)    Parameters below as of 2023 13:00  Patient On (Oxygen Delivery Method): room air      Daily     Daily Weight in k.5 (2023 05:08)    GENERAL:  NAD, Appears stated age  HEENT:  NC/AT,  conjunctivae clear and pink, sclera -anicteric  CHEST:  Normal Effort, Breath sounds clear  HEART:  RRR, S1 + S2, no murmurs  ABDOMEN:  Slightly tenser ascites though remains soft, nontender  EXTREMITIES:  no cyanosis or edema  SKIN:  Warm & Dry. No rash or erythema, improving trace bozena  NEURO:  Alert, oriented, no focal deficit, no asterixis    LABS:                        12.1   8.58  )-----------( 73       ( 2023 06:36 )             35.8     Mean Cell Volume: 105.3 fL (07-26-23 @ 06:36)        135  |  96<L>  |  14  ----------------------------<  93  4.0   |  27  |  0.72    Ca    9.1      2023 06:36  Phos  4.0       Mg     1.50         TPro  7.4  /  Alb  3.2<L>  /  TBili  3.8<H>  /  DBili  x   /  AST  54<H>  /  ALT  27  /  AlkPhos  217<H>      LIVER FUNCTIONS - ( 2023 06:36 )  Alb: 3.2 g/dL / Pro: 7.4 g/dL / ALK PHOS: 217 U/L / ALT: 27 U/L / AST: 54 U/L / GGT: x           PT/INR - ( 2023 06:36 )   PT: 16.2 sec;   INR: 1.45 ratio         PTT - ( 2023 06:36 )  PTT:42.1 sec  Urinalysis Basic - ( 2023 06:36 )    Color: x / Appearance: x / SG: x / pH: x  Gluc: 93 mg/dL / Ketone: x  / Bili: x / Urobili: x   Blood: x / Protein: x / Nitrite: x   Leuk Esterase: x / RBC: x / WBC x   Sq Epi: x / Non Sq Epi: x / Bacteria: x                              12.1   8.58  )-----------( 73       ( 2023 06:36 )             35.8                         11.7   7.71  )-----------( 60       ( 2023 06:32 )             35.0                         11.7   8.27  )-----------( 62       ( 2023 05:19 )             34.9       Imaging: Images reviewed.

## 2023-07-26 NOTE — PROGRESS NOTE ADULT - PROBLEM SELECTOR PLAN 3
Hgb stable at 12.0>12.1>10.8>11.1 > 11.7 with .5, consistent with macrocytic anemia, likely 2/2 alcohol use. B12/folate WNL. Hemolytic anemia considered () however less likely given Retic 3.0%, haptoglobin 105.  -c/w folic acid supplement daily  -monitor Hgb stable at 11-12 with .5, consistent with macrocytic anemia, likely 2/2 alcohol use. B12/folate WNL. Hemolytic anemia considered () however less likely given Retic 3.0%, haptoglobin 105.  -c/w folic acid supplement daily  -monitor

## 2023-07-27 LAB
ALBUMIN SERPL ELPH-MCNC: 3 G/DL — LOW (ref 3.3–5)
ALP SERPL-CCNC: 224 U/L — HIGH (ref 40–120)
ALT FLD-CCNC: 29 U/L — SIGNIFICANT CHANGE UP (ref 4–41)
ANION GAP SERPL CALC-SCNC: 12 MMOL/L — SIGNIFICANT CHANGE UP (ref 7–14)
APTT BLD: 37.5 SEC — HIGH (ref 24.5–35.6)
AST SERPL-CCNC: 60 U/L — HIGH (ref 4–40)
BILIRUB SERPL-MCNC: 3.2 MG/DL — HIGH (ref 0.2–1.2)
BUN SERPL-MCNC: 16 MG/DL — SIGNIFICANT CHANGE UP (ref 7–23)
CALCIUM SERPL-MCNC: 8.4 MG/DL — SIGNIFICANT CHANGE UP (ref 8.4–10.5)
CHLORIDE SERPL-SCNC: 98 MMOL/L — SIGNIFICANT CHANGE UP (ref 98–107)
CO2 SERPL-SCNC: 23 MMOL/L — SIGNIFICANT CHANGE UP (ref 22–31)
CREAT SERPL-MCNC: 0.86 MG/DL — SIGNIFICANT CHANGE UP (ref 0.5–1.3)
EGFR: 102 ML/MIN/1.73M2 — SIGNIFICANT CHANGE UP
GLUCOSE SERPL-MCNC: 114 MG/DL — HIGH (ref 70–99)
HCT VFR BLD CALC: 33.4 % — LOW (ref 39–50)
HGB BLD-MCNC: 11.2 G/DL — LOW (ref 13–17)
INR BLD: 1.52 RATIO — HIGH (ref 0.85–1.18)
MAGNESIUM SERPL-MCNC: 1.6 MG/DL — SIGNIFICANT CHANGE UP (ref 1.6–2.6)
MCHC RBC-ENTMCNC: 33.5 GM/DL — SIGNIFICANT CHANGE UP (ref 32–36)
MCHC RBC-ENTMCNC: 35 PG — HIGH (ref 27–34)
MCV RBC AUTO: 104.4 FL — HIGH (ref 80–100)
NRBC # BLD: 0 /100 WBCS — SIGNIFICANT CHANGE UP (ref 0–0)
NRBC # FLD: 0 K/UL — SIGNIFICANT CHANGE UP (ref 0–0)
PHOSPHATE SERPL-MCNC: 3.5 MG/DL — SIGNIFICANT CHANGE UP (ref 2.5–4.5)
PLATELET # BLD AUTO: 69 K/UL — LOW (ref 150–400)
POTASSIUM SERPL-MCNC: 3.8 MMOL/L — SIGNIFICANT CHANGE UP (ref 3.5–5.3)
POTASSIUM SERPL-SCNC: 3.8 MMOL/L — SIGNIFICANT CHANGE UP (ref 3.5–5.3)
PROT SERPL-MCNC: 6.6 G/DL — SIGNIFICANT CHANGE UP (ref 6–8.3)
PROTHROM AB SERPL-ACNC: 16.8 SEC — HIGH (ref 9.5–13)
RBC # BLD: 3.2 M/UL — LOW (ref 4.2–5.8)
RBC # FLD: 15.1 % — HIGH (ref 10.3–14.5)
SODIUM SERPL-SCNC: 133 MMOL/L — LOW (ref 135–145)
WBC # BLD: 9.51 K/UL — SIGNIFICANT CHANGE UP (ref 3.8–10.5)
WBC # FLD AUTO: 9.51 K/UL — SIGNIFICANT CHANGE UP (ref 3.8–10.5)

## 2023-07-27 PROCEDURE — 99231 SBSQ HOSP IP/OBS SF/LOW 25: CPT | Mod: GC

## 2023-07-27 PROCEDURE — 49083 ABD PARACENTESIS W/IMAGING: CPT | Mod: GC

## 2023-07-27 RX ORDER — MAGNESIUM SULFATE 500 MG/ML
1 VIAL (ML) INJECTION ONCE
Refills: 0 | Status: COMPLETED | OUTPATIENT
Start: 2023-07-27 | End: 2023-07-27

## 2023-07-27 RX ORDER — ALBUMIN HUMAN 25 %
100 VIAL (ML) INTRAVENOUS ONCE
Refills: 0 | Status: COMPLETED | OUTPATIENT
Start: 2023-07-27 | End: 2023-07-27

## 2023-07-27 RX ORDER — PREDNISOLONE 5 MG
10 TABLET ORAL
Qty: 220 | Refills: 0
Start: 2023-07-27 | End: 2023-08-17

## 2023-07-27 RX ORDER — LIDOCAINE HCL 20 MG/ML
20 VIAL (ML) INJECTION ONCE
Refills: 0 | Status: COMPLETED | OUTPATIENT
Start: 2023-07-27 | End: 2023-07-27

## 2023-07-27 RX ADMIN — Medication 50 MILLILITER(S): at 12:34

## 2023-07-27 RX ADMIN — Medication 40 MILLIGRAM(S): at 06:46

## 2023-07-27 RX ADMIN — POLYETHYLENE GLYCOL 3350 17 GRAM(S): 17 POWDER, FOR SOLUTION ORAL at 12:39

## 2023-07-27 RX ADMIN — HEPARIN SODIUM 5000 UNIT(S): 5000 INJECTION INTRAVENOUS; SUBCUTANEOUS at 06:48

## 2023-07-27 RX ADMIN — Medication 100 GRAM(S): at 15:36

## 2023-07-27 RX ADMIN — SPIRONOLACTONE 100 MILLIGRAM(S): 25 TABLET, FILM COATED ORAL at 17:48

## 2023-07-27 RX ADMIN — HEPARIN SODIUM 5000 UNIT(S): 5000 INJECTION INTRAVENOUS; SUBCUTANEOUS at 21:25

## 2023-07-27 RX ADMIN — Medication 50 MILLILITER(S): at 16:37

## 2023-07-27 RX ADMIN — Medication 40 MILLIGRAM(S): at 17:48

## 2023-07-27 RX ADMIN — Medication 20 MILLILITER(S): at 11:20

## 2023-07-27 RX ADMIN — SPIRONOLACTONE 100 MILLIGRAM(S): 25 TABLET, FILM COATED ORAL at 06:45

## 2023-07-27 RX ADMIN — Medication 1 MILLILITER(S): at 17:48

## 2023-07-27 NOTE — PROGRESS NOTE ADULT - ASSESSMENT
56yo w/ PMHx AUD, remote hx DVT (not on AC, ?if provoked) presenting w/ jaundice and ascites in setting heavy alcohol use c/f alcoholic hepatitis and cirrhosis.    #Alcoholic hepatitis: Clinically improving on steroids (7/21-) w/ Maddrey 27.4--> --> 49--> 27 --> 21.9 -->16.1 -->18. Per ID, C. diff likely colonization. Lille 0.157  # Cirrhosis -  E/o cirrhosis on CT scan, most likely 2/2 AUD. MARTA 1:320 and w/ elevated IgG.  Fluid studies c/w portal HTN. No e/o PVT on U/S. TTE WNL. Clinically is volume up w/o asterixis. Will need screening EGD. Still w/ significant ascites on exam despite adequate diuresis based on Sheila  CT A/P IVC 7/21: Cirrhosis and portal hypertension with moderate abdominopelvic ascites. No portal thrombosis. Portal and hepatic veins are patent.  MELD 20 (7/20)-->23 (7/21)-->20 (7/24) --> 20 (7/25)--> 19 (7/26) --> 18 B+  Ascites: s/p 16 L removed 7/20, on diuretics as below, pending possible repeat LVP prior to d/c  EV: no prior EGD  HCC: No e/o HCC on CT  HAV IgM/IgG negative, would need immunization  #AUD: Pt feels he can quit easily and not currently interested in pharmacological assistance but may benefit from discussion on rehab/group programs. 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 Simental  -Will need screening EGD- will discuss inpatient vs. outpt  -Ongoing eval for OLT- likely to be continued as an outpt     Hepatology will sign  off. please call with any questions  All recommendations are tentative until note is attested by attending.     Monica Hale  GI/Hepatology Fellow PGY5    NON-URGENT CONSULTS:  Please email [email or web address]  AT NIGHT AND ON WEEKENDS:  Available on Microsoft Teams  [phone] (Long Range Pager)    After 5pm, please contact the on-call GI fellow. [phone] 56yo w/ PMHx AUD, remote hx DVT (not on AC, ?if provoked) presenting w/ jaundice and ascites in setting heavy alcohol use c/f alcoholic hepatitis and cirrhosis.    #Alcoholic hepatitis: Clinically improving on steroids (7/21-) w/ Maddrey 27.4--> --> 49--> 27 --> 21.9 -->16.1 -->18. Per ID, C. diff likely colonization. Lille 0.157  # Cirrhosis -  E/o cirrhosis on CT scan, most likely 2/2 AUD. MARTA 1:320 and w/ elevated IgG.  Fluid studies c/w portal HTN. No e/o PVT on U/S. TTE WNL. Clinically is volume up w/o asterixis. Will need screening EGD. Still w/ significant ascites on exam despite adequate diuresis based on Sheila  CT A/P IVC 7/21: Cirrhosis and portal hypertension with moderate abdominopelvic ascites. No portal thrombosis. Portal and hepatic veins are patent.  MELD 20 (7/20)-->23 (7/21)-->20 (7/24) --> 20 (7/25)--> 19 (7/26) --> 18 B+  Ascites: s/p 16 L removed 7/20, on diuretics as below, pending possible repeat LVP prior to d/c  EV: no prior EGD  HCC: No e/o HCC on CT  HAV IgM/IgG negative, would need immunization  #AUD: Pt feels he can quit easily and not currently interested in pharmacological assistance but may benefit from discussion on rehab/group programs. 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 Simental  -Will need screening EGD- will discuss inpatient vs. outpt  -Ongoing eval for OLT- likely to be continued as an outpt     Hepatology will sign  off. please call with any questions  All recommendations are tentative until note is attested by attending.     Monica Hale  GI/Hepatology Fellow PGY5    NON-URGENT CONSULTS:  Please email [email or web address]  AT NIGHT AND ON WEEKENDS:  Available on Microsoft Teams  [phone] (Long Range Pager)    After 5pm, please contact the on-call GI fellow. [phone]

## 2023-07-27 NOTE — PROGRESS NOTE ADULT - PROBLEM SELECTOR PLAN 7
Plt count 58>44>52>50 > 59 > 62 > 60 > 73 (7/26). Also with elevated PT/PTT, elevated D-dimer, decreased fibrinogen on admission. Suspect coagulopathies from bone marrow suppression in setting of chronic alcohol use and possibly from liver failure. Low suspicion for TTP given lack of fevers, renal failure, and AMS. DIC also considered but unlikely given normal haptoglobin. PT/PTT/INR downtrending  -monitor plt  -transfuse <10, <20k and febrile, and <50k w/ active bleeding Plt count 58>44>52>50 > 59 > 62 > 60 > 73 > 69 (7/27). Also with elevated PT/PTT, elevated D-dimer, decreased fibrinogen on admission. Suspect coagulopathies from bone marrow suppression in setting of chronic alcohol use and possibly from liver failure. Low suspicion for TTP given lack of fevers, renal failure, and AMS. DIC also considered but unlikely given normal haptoglobin. PT/PTT/INR downtrending  -monitor plt  -transfuse <10, <20k and febrile, and <50k w/ active bleeding

## 2023-07-27 NOTE — PROGRESS NOTE ADULT - PROBLEM SELECTOR PLAN 3
Hgb stable at 11-12 with .5, consistent with macrocytic anemia, likely 2/2 alcohol use. B12/folate WNL. Hemolytic anemia considered () however less likely given Retic 3.0%, haptoglobin 105.  -c/w folic acid supplement daily  -monitor

## 2023-07-27 NOTE — PROCEDURE NOTE - ADDITIONAL PROCEDURE DETAILS
Invasive procedure team was consulted for diagnostic/therapeutic paracentesis due to abdominal discomfort. Explained risks (including bleeding, infection, and bowel perforation) and benefits to patient and patient expressed understanding and consented. Ultrasound was utilized and visualized 12cm Ascitic fluid pocket identified w/ no epigastric vessels visualized. No rash or vessels overlying skin site. Pts plts 69, INR 1.52, DVT PPx held over night, not on NOACs or coumadin. Sterile technique was used and 4mL of 1% lidocaine was used for local anesthesia. Small incision with scalpel done and 18 Fr Arrow Paracentesis catheter used. 7.850L of clear yellow ascitic fluid drained. Catheter removed and dressed. Pt tolerated procedure well and no complications. Communicated to primary team.
Invasive procedure team was consulted for diagnostic/therapeutic paracentesis due to abdominal discomfort. Explained risks (including bleeding, infection, and bowel perforation) and benefits to patient and patient expressed understanding and consented. Ultrasound was utilized and visualized 10cm Ascitic fluid pocket identified w/ no epigastric vessels visualized. No rash or vessels overlying skin site. Pts plts 52, INR 1.55, DVT PPx held over night, not on NOACs or coumadin. Sterile technique was used and 5mL of 1% lidocaine was used for local anesthesia. Small incision with scalpel done and 18 Fr Arrow Paracentesis catheter used. 16.3L of clear yellow ascitic fluid drained. Catheter removed and dressed. Pt tolerated procedure well and no complications. Lab Analysis labeled and left at bedside. Communicated to primary team.

## 2023-07-27 NOTE — PROGRESS NOTE ADULT - PROBLEM SELECTOR PLAN 1
Pt with AUD p/w 1.5 weeks of increasing abdominal distention and b/l LE edema. Ascites and cirrhosis confirmed on US. s/p paracentesis with 16L removed. Patient feeling much better after having fluid removed. Peritoneal fluid showing 157 nucleated cell count r/o SBP.  Fluid gram stain w/ PML, no organisms. SAAG 1.8 - in setting of unremarkable TTE, ascites 2/2 portal hypertension, likely alcoholic cirrhosis. CT abd/pelvis w/ nodular liver. No evidence of PVT/Budd Chiari syndrome. As of 7/25 his abdomen is more firm and distended, will assess for reaccumulation on US.  -GI/hepatology consulted appreciated recs  -s/p IV albumin 25% 100mg x3  -re-accumulation on abd US 7/25, procedure team consulted will repeat tap 7/27  -c/w Lasix 40mg PO BID, spironolactone 100mg PO BID  -c/w Miralax daily  -EGD/transplant screen as outpatient Pt with AUD p/w 1.5 weeks of increasing abdominal distention and b/l LE edema. Ascites and cirrhosis confirmed on US. s/p paracentesis with 16L removed. Patient feeling much better after having fluid removed. Peritoneal fluid showing 157 nucleated cell count r/o SBP.  Fluid gram stain w/ PML, no organisms. SAAG 1.8 - in setting of unremarkable TTE, ascites 2/2 portal hypertension, likely alcoholic cirrhosis. CT abd/pelvis w/ nodular liver. No evidence of PVT/Budd Chiari syndrome.   -GI/hepatology consulted appreciated recs  -s/p IV albumin 25% 100mg x3  -procedure team to repeat tap 7/27, plan for dc after  -c/w Lasix 40mg PO BID, spironolactone 100mg PO BID  -c/w Miralax daily  -EGD/transplant screen as outpatient

## 2023-07-27 NOTE — PROCEDURE NOTE - SUPERVISORY STATEMENT
Patient seen and examined, amenable and consented for procedure s/p risk and benefit discussion.   Area was marked with use of US due to US malfunction images unable to be printed and placed in chart.  Safe pocket found w/o overlying vessels.  7850 yellow ascites removed, output stopped w/ US showing trace ascites remaining.  Pt tolerated procedure w/o issue.  Rec albumin s/p removal given >5L.

## 2023-07-27 NOTE — PROGRESS NOTE ADULT - ATTENDING COMMENTS
57M with PMH of AUD, ?prior DVT no longer on AC who is presenting with increasing abdominal distention and b/l LE edema secondary to acute decompensated alcoholic cirrhosis.     # AUD  # Alcoholic Hepatitis   # Acute decompensated alcoholic cirrhosis   - no h/o withdrawals, s/p CIWA protocol  - s/p paracentesis 7/20 with 16L removed; fluid analysis not consistent with SBP. repeat paracentesis today with 7L removed  - Hepatology following - s/p NAC protocol, d/c ursodiol and cholestyramine; initiated steroid on 7/21 - plan for 21 days treatment; will confirm coverage of prednisolone at pharmacy.   - Screening EGD as outpatient per hepatology     # + C diff - given that patient is having formed stool, suspect this is likely secondary to colonization, no need for ABX at this time. Appreciate ID recs.   # Anemia, Thrombocytopenia, Coagulopathy  - in setting of cirrhosis, continue to monitor.     Dispo: plan for d/c home with hepatology and IR follow up. possible d/c today d/c planning 45 min coordinating care.

## 2023-07-27 NOTE — PROGRESS NOTE ADULT - ATTENDING COMMENTS
S/p repeat LVP 7.8L today  - continue current doses of diuretics, daily weights; diuretic dosing upon discharge depending on volume status tomorrow and timing of follow-up

## 2023-07-27 NOTE — PROGRESS NOTE ADULT - PROBLEM SELECTOR PLAN 2
Pt with elevated t bili to 9.0, d bili 4.5, alk phos 388, AST 91, and ALT 18. Elevated GGT suggestive of cholestatic disease. Concerning for cholestasis/biliary obstruction with component of hepatocellular injury/hepatitis 2/2 alcohol use.  -appreciate GI/Hepatology recs  -trend LFTs  -s/p loading dose NAC 150mg/kg/day. c/w maintenance dose 100mg/kg/day until 7/24  -s/p ursodiol 500mg BID  -c/w prednisolone 40mg daily, will continue as outpatient for total 28 days  -f/u bcx, NGTD

## 2023-07-27 NOTE — PROGRESS NOTE ADULT - SUBJECTIVE AND OBJECTIVE BOX
Emerson Ocasio MD  PGY 1 Department of Internal Medicine        Patient is a 57y old  Male who presents with a chief complaint of Abdominal distention (27 Jul 2023 06:53)      SUBJECTIVE / OVERNIGHT EVENTS: Pt seen and examined. No acute overnight events. Denies fevers, chills, CP, SOB, Abdominal pain, N/V, Constipation, Diarrhea        MEDICATIONS  (STANDING):  furosemide    Tablet 40 milliGRAM(s) Oral every 12 hours  heparin   Injectable 5000 Unit(s) SubCutaneous every 8 hours  hepatitis A (virus) Vaccine - Adult (HAVRIX) 1 milliLiter(s) IntraMuscular once  lidocaine 1% Injectable 20 milliLiter(s) Local Injection once  magnesium sulfate  IVPB 1 Gram(s) IV Intermittent once  polyethylene glycol 3350 17 Gram(s) Oral daily  prednisoLONE    3 mG/mL Solution (ORAPRED) 40 milliGRAM(s) Oral daily  spironolactone 100 milliGRAM(s) Oral two times a day    MEDICATIONS  (PRN):  acetaminophen     Tablet .. 650 milliGRAM(s) Oral every 6 hours PRN Temp greater or equal to 38C (100.4F), Mild Pain (1 - 3)  calamine/zinc oxide Lotion 1 Application(s) Topical daily PRN Rash and/or Itching      I&O's Summary    26 Jul 2023 07:01  -  27 Jul 2023 07:00  --------------------------------------------------------  IN: 600 mL / OUT: 1302 mL / NET: -702 mL        Vital Signs Last 24 Hrs  T(C): 36.7 (27 Jul 2023 10:07), Max: 36.8 (26 Jul 2023 20:21)  T(F): 98 (27 Jul 2023 10:07), Max: 98.2 (26 Jul 2023 20:21)  HR: 82 (27 Jul 2023 10:07) (71 - 85)  BP: 117/72 (27 Jul 2023 10:07) (109/66 - 141/74)  BP(mean): --  RR: 18 (27 Jul 2023 10:07) (17 - 18)  SpO2: 98% (27 Jul 2023 10:07) (96% - 98%)    Parameters below as of 27 Jul 2023 10:07  Patient On (Oxygen Delivery Method): room air        CAPILLARY BLOOD GLUCOSE          PHYSICAL EXAM:  GENERAL: NAD,  resting comfortably  HEAD:  Atraumatic, Normocephalic  EYES: EOMI, PERRL, conjunctiva nl, scleral icterus bilaterally  NECK: No JVD  CHEST/LUNG: Clear to auscultation bilaterally; No wheeze  HEART: Regular rate and rhythm; No murmurs, rubs, or gallops  ABDOMEN: Abdomen distended and firm, unchanged from yesterday. Nontender. Reducible umbilical hernia; Bowel sounds present.   EXTREMITIES:  2+ Peripheral Pulses, 2+ pitting edema to lower extremities, No clubbing, cyanosis. Calves are non tender and without erythema or warmth.  PSYCH: AAOx3  NEUROLOGY: non-focal, moving all extremities, cranial nerves intact  SKIN: Multiple areas of excoriations to extremities with scabbing  lesions       LABS:                        11.2   9.51  )-----------( 69       ( 27 Jul 2023 05:20 )             33.4     Auto Eosinophil # x     / Auto Eosinophil % x     / Auto Neutrophil # x     / Auto Neutrophil % x     / BANDS % x                            12.1   8.58  )-----------( 73       ( 26 Jul 2023 06:36 )             35.8     Auto Eosinophil # x     / Auto Eosinophil % x     / Auto Neutrophil # x     / Auto Neutrophil % x     / BANDS % x        07-27    133<L>  |  98  |  16  ----------------------------<  114<H>  3.8   |  23  |  0.86  07-26    135  |  96<L>  |  14  ----------------------------<  93  4.0   |  27  |  0.72    Ca    8.4      27 Jul 2023 05:20  Mg     1.60     07-27  Phos  3.5     07-27  TPro  6.6  /  Alb  3.0<L>  /  TBili  3.2<H>  /  DBili  x   /  AST  60<H>  /  ALT  29  /  AlkPhos  224<H>  07-27  TPro  7.4  /  Alb  3.2<L>  /  TBili  3.8<H>  /  DBili  x   /  AST  54<H>  /  ALT  27  /  AlkPhos  217<H>  07-26    PT/INR - ( 27 Jul 2023 05:20 )   PT: 16.8 sec;   INR: 1.52 ratio         PTT - ( 27 Jul 2023 05:20 )  PTT:37.5 sec      Urinalysis Basic - ( 27 Jul 2023 05:20 )    Color: x / Appearance: x / SG: x / pH: x  Gluc: 114 mg/dL / Ketone: x  / Bili: x / Urobili: x   Blood: x / Protein: x / Nitrite: x   Leuk Esterase: x / RBC: x / WBC x   Sq Epi: x / Non Sq Epi: x / Bacteria: x            RADIOLOGY & ADDITIONAL TESTS:    Imaging Personally Reviewed:    Consultant(s) Notes Reviewed:      Care Discussed with Consultants/Other Providers:

## 2023-07-27 NOTE — PROCEDURE NOTE - NSPROCDETAILS_GEN_ALL_CORE
location identified, sterile technique used, catheter introduced, fluid drained
location identified, sterile technique used, catheter introduced, fluid drained/sterile dressing applied

## 2023-07-27 NOTE — PROGRESS NOTE ADULT - PROBLEM SELECTOR PLAN 11
as of 7/26, mag 1.5, phos 4.0, potassium 4.0. Likely in setting of chronic alcohol use.   -Goal Mag 2.0, Phos 3.0, Potassium 4.0  -will replete as needed as of 7/26, mag 1.6, phos 3.5, potassium 3.8. Likely in setting of chronic alcohol use.   -Goal Mag 2.0, Phos 3.0, Potassium 4.0  -will replete as needed

## 2023-07-27 NOTE — PROCEDURE NOTE - NSINFORMCONSENT_GEN_A_CORE
Benefits, risks, and possible complications of procedure explained to patient/caregiver who verbalized understanding and gave written consent.
00/Benefits, risks, and possible complications of procedure explained to patient/caregiver who verbalized understanding and gave written consent.

## 2023-07-27 NOTE — PROGRESS NOTE ADULT - PROBLEM SELECTOR PLAN 9
sodium 135 > 133 > 131 > 134 > 133 > 135 (7/26). likely in setting of diuresis, could also have underlying SIADH.  -monitor sodium sodium 135 > 133 > 131 > 134 > 133 > 135 > 133 (7/27). likely in setting of diuresis, could also have underlying SIADH.  -monitor sodium

## 2023-07-27 NOTE — PROGRESS NOTE ADULT - PROBLEM SELECTOR PLAN 8
on exam has multiple areas of excoriation and scabbing to all extremities. Pt reports he has intermittent diffuse skin pruritis, not experiencing at this time. Has tried multiple OTC creams to no effect. Pruritis likely 2/2 elevated bilirubin and liver dysfunction.  -c/w calamine lotion  -monitor

## 2023-07-27 NOTE — PROGRESS NOTE ADULT - SUBJECTIVE AND OBJECTIVE BOX
Interval Events:   -feeling well, no concerns  -Had pos IgG adn pos MARTA on lab tests here raising possibility of other etiologies of liver disease  -MDF 18.8 <-- 16.1  -MELD 18 <--17  -tapped today  Patient denies any abdominal pain, nausea /vomiting, diarrhea or melena / bloody bm    Hospital Medications:  acetaminophen     Tablet .. 650 milliGRAM(s) Oral every 6 hours PRN  calamine/zinc oxide Lotion 1 Application(s) Topical daily PRN  furosemide    Tablet 40 milliGRAM(s) Oral every 12 hours  heparin   Injectable 5000 Unit(s) SubCutaneous every 8 hours  polyethylene glycol 3350 17 Gram(s) Oral daily  prednisoLONE    3 mG/mL Solution (ORAPRED) 40 milliGRAM(s) Oral daily  spironolactone 100 milliGRAM(s) Oral two times a day      ROS: All system reviewed and negative except as mentioned above.    PHYSICAL EXAM:   Vital Signs:  Vital Signs Last 24 Hrs  T(C): 36.8 (2023 20:34), Max: 36.8 (2023 17:47)  T(F): 98.2 (2023 20:34), Max: 98.2 (2023 17:47)  HR: 79 (2023 20:34) (74 - 82)  BP: 133/69 (2023 20:34) (109/66 - 141/74)  BP(mean): --  RR: 18 (2023 20:34) (17 - 18)  SpO2: 98% (2023 20:34) (97% - 98%)    Parameters below as of 2023 17:47  Patient On (Oxygen Delivery Method): room air      Daily     Daily Weight in k.5 (2023 06:35)    GENERAL:  NAD, Appears stated age  HEENT:  NC/AT,  conjunctivae clear and pink, sclera -anicteric  CHEST:  Normal Effort, Breath sounds clear  HEART:  RRR, S1 + S2, no murmurs  ABDOMEN:  Soft, non tense ascites though large  EXTREMITIES:  no cyanosis or edema  SKIN:  Warm & Dry. No rash or erythema  NEURO:  Alert, oriented, no focal deficit, no asterixis    LABS:                        11.2   9.51  )-----------( 69       ( 2023 05:20 )             33.4     Mean Cell Volume: 104.4 fL (- @ 05:20)        133<L>  |  98  |  16  ----------------------------<  114<H>  3.8   |  23  |  0.86    Ca    8.4      2023 05:20  Phos  3.5       Mg     1.60         TPro  6.6  /  Alb  3.0<L>  /  TBili  3.2<H>  /  DBili  x   /  AST  60<H>  /  ALT  29  /  AlkPhos  224<H>      LIVER FUNCTIONS - ( 2023 05:20 )  Alb: 3.0 g/dL / Pro: 6.6 g/dL / ALK PHOS: 224 U/L / ALT: 29 U/L / AST: 60 U/L / GGT: x           PT/INR - ( 2023 05:20 )   PT: 16.8 sec;   INR: 1.52 ratio         PTT - ( 2023 05:20 )  PTT:37.5 sec  Urinalysis Basic - ( 2023 05:20 )    Color: x / Appearance: x / SG: x / pH: x  Gluc: 114 mg/dL / Ketone: x  / Bili: x / Urobili: x   Blood: x / Protein: x / Nitrite: x   Leuk Esterase: x / RBC: x / WBC x   Sq Epi: x / Non Sq Epi: x / Bacteria: x                              11.2   9.51  )-----------( 69       ( 2023 05:20 )             33.4                         12.1   8.58  )-----------( 73       ( 2023 06:36 )             35.8                         11.7   7.71  )-----------( 60       ( 2023 06:32 )             35.0       Imaging: Images reviewed.

## 2023-07-28 ENCOUNTER — TRANSCRIPTION ENCOUNTER (OUTPATIENT)
Age: 58
End: 2023-07-28

## 2023-07-28 VITALS
DIASTOLIC BLOOD PRESSURE: 65 MMHG | TEMPERATURE: 98 F | RESPIRATION RATE: 18 BRPM | HEART RATE: 77 BPM | OXYGEN SATURATION: 100 % | SYSTOLIC BLOOD PRESSURE: 119 MMHG

## 2023-07-28 PROCEDURE — 99239 HOSP IP/OBS DSCHRG MGMT >30: CPT | Mod: GC

## 2023-07-28 RX ORDER — MAGNESIUM SULFATE 500 MG/ML
1 VIAL (ML) INJECTION ONCE
Refills: 0 | Status: DISCONTINUED | OUTPATIENT
Start: 2023-07-28 | End: 2023-07-28

## 2023-07-28 RX ORDER — SPIRONOLACTONE 25 MG/1
4 TABLET, FILM COATED ORAL
Qty: 240 | Refills: 0
Start: 2023-07-28 | End: 2023-08-26

## 2023-07-28 RX ORDER — FUROSEMIDE 40 MG
1 TABLET ORAL
Qty: 60 | Refills: 0
Start: 2023-07-28 | End: 2023-08-26

## 2023-07-28 RX ADMIN — HEPARIN SODIUM 5000 UNIT(S): 5000 INJECTION INTRAVENOUS; SUBCUTANEOUS at 13:00

## 2023-07-28 RX ADMIN — Medication 40 MILLIGRAM(S): at 05:57

## 2023-07-28 RX ADMIN — POLYETHYLENE GLYCOL 3350 17 GRAM(S): 17 POWDER, FOR SOLUTION ORAL at 13:00

## 2023-07-28 RX ADMIN — HEPARIN SODIUM 5000 UNIT(S): 5000 INJECTION INTRAVENOUS; SUBCUTANEOUS at 06:00

## 2023-07-28 RX ADMIN — SPIRONOLACTONE 100 MILLIGRAM(S): 25 TABLET, FILM COATED ORAL at 05:56

## 2023-07-28 NOTE — PROGRESS NOTE ADULT - PROBLEM SELECTOR PLAN 7
Plt count 58>44>52>50 > 59 > 62 > 60 > 73 > 69 (7/27). Also with elevated PT/PTT, elevated D-dimer, decreased fibrinogen on admission. Suspect coagulopathies from bone marrow suppression in setting of chronic alcohol use and possibly from liver failure. Low suspicion for TTP given lack of fevers, renal failure, and AMS. DIC also considered but unlikely given normal haptoglobin. PT/PTT/INR downtrending  -monitor plt  -transfuse <10, <20k and febrile, and <50k w/ active bleeding

## 2023-07-28 NOTE — PROGRESS NOTE ADULT - PROBLEM SELECTOR PROBLEM 4
History of alcohol use disorder

## 2023-07-28 NOTE — DISCHARGE NOTE NURSING/CASE MANAGEMENT/SOCIAL WORK - NSDCFUADDAPPT_GEN_ALL_CORE_FT
Please call gastroenterology to schedule an appointment. You will need to discuss scheduling an endoscopy.    Please also follow up with Interventional radiology

## 2023-07-28 NOTE — PROGRESS NOTE ADULT - PROBLEM SELECTOR PROBLEM 6
SOB (shortness of breath)

## 2023-07-28 NOTE — PROGRESS NOTE ADULT - PROBLEM SELECTOR PLAN 11
as of 7/26, mag 1.6, phos 3.5, potassium 3.8. Likely in setting of chronic alcohol use.   -Goal Mag 2.0, Phos 3.0, Potassium 4.0  -will replete as needed as of 7/27, mag 1.6, phos 3.5, potassium 3.8. Likely in setting of chronic alcohol use.   -Goal Mag 2.0, Phos 3.0, Potassium 4.0  -will replete as needed

## 2023-07-28 NOTE — PROGRESS NOTE ADULT - SUBJECTIVE AND OBJECTIVE BOX
Emerson Ocasio MD  PGY 1 Department of Internal Medicine        Patient is a 57y old  Male who presents with a chief complaint of Abdominal distention (28 Jul 2023 07:05)      SUBJECTIVE / OVERNIGHT EVENTS: Pt seen and examined. No acute overnight events. Denies fevers, chills, CP, SOB, Abdominal pain, N/V, Constipation, Diarrhea        MEDICATIONS  (STANDING):  furosemide    Tablet 40 milliGRAM(s) Oral every 12 hours  heparin   Injectable 5000 Unit(s) SubCutaneous every 8 hours  polyethylene glycol 3350 17 Gram(s) Oral daily  prednisoLONE    3 mG/mL Solution (ORAPRED) 40 milliGRAM(s) Oral daily  spironolactone 100 milliGRAM(s) Oral two times a day    MEDICATIONS  (PRN):  acetaminophen     Tablet .. 650 milliGRAM(s) Oral every 6 hours PRN Temp greater or equal to 38C (100.4F), Mild Pain (1 - 3)  calamine/zinc oxide Lotion 1 Application(s) Topical daily PRN Rash and/or Itching      I&O's Summary    27 Jul 2023 07:01  -  28 Jul 2023 07:00  --------------------------------------------------------  IN: 0 mL / OUT: 650 mL / NET: -650 mL        Vital Signs Last 24 Hrs  T(C): 36.5 (28 Jul 2023 09:37), Max: 36.8 (27 Jul 2023 17:47)  T(F): 97.7 (28 Jul 2023 09:37), Max: 98.2 (27 Jul 2023 17:47)  HR: 78 (28 Jul 2023 09:37) (66 - 82)  BP: 132/68 (28 Jul 2023 09:37) (115/66 - 133/69)  BP(mean): --  RR: 19 (28 Jul 2023 09:37) (18 - 19)  SpO2: 100% (28 Jul 2023 09:37) (97% - 100%)    Parameters below as of 28 Jul 2023 09:37  Patient On (Oxygen Delivery Method): room air        CAPILLARY BLOOD GLUCOSE          PHYSICAL EXAM:  GENERAL: NAD,   HEAD:  Atraumatic, Normocephalic  EYES: EOMI, PERRL, conjunctiva and sclera clear  NECK: No JVD  CHEST/LUNG: Clear to auscultation bilaterally; No wheeze  HEART: Regular rate and rhythm; No murmurs, rubs, or gallops  ABDOMEN: Soft, Nontender, Nondistended; Bowel sounds present  EXTREMITIES:  2+ Peripheral Pulses, No clubbing, cyanosis, or edema  PSYCH: AAOx3  NEUROLOGY: non-focal  SKIN: No rashes or lesions       LABS:                        11.2   9.51  )-----------( 69       ( 27 Jul 2023 05:20 )             33.4     Auto Eosinophil # x     / Auto Eosinophil % x     / Auto Neutrophil # x     / Auto Neutrophil % x     / BANDS % x        07-27    133<L>  |  98  |  16  ----------------------------<  114<H>  3.8   |  23  |  0.86    Ca    8.4      27 Jul 2023 05:20  Mg     1.60     07-27  Phos  3.5     07-27  TPro  6.6  /  Alb  3.0<L>  /  TBili  3.2<H>  /  DBili  x   /  AST  60<H>  /  ALT  29  /  AlkPhos  224<H>  07-27    PT/INR - ( 27 Jul 2023 05:20 )   PT: 16.8 sec;   INR: 1.52 ratio         PTT - ( 27 Jul 2023 05:20 )  PTT:37.5 sec      Urinalysis Basic - ( 27 Jul 2023 05:20 )    Color: x / Appearance: x / SG: x / pH: x  Gluc: 114 mg/dL / Ketone: x  / Bili: x / Urobili: x   Blood: x / Protein: x / Nitrite: x   Leuk Esterase: x / RBC: x / WBC x   Sq Epi: x / Non Sq Epi: x / Bacteria: x            RADIOLOGY & ADDITIONAL TESTS:    Imaging Personally Reviewed:    Consultant(s) Notes Reviewed:      Care Discussed with Consultants/Other Providers:   Emerson Ocasio MD  PGY 1 Department of Internal Medicine        Patient is a 57y old  Male who presents with a chief complaint of Abdominal distention (28 Jul 2023 07:05)      SUBJECTIVE / OVERNIGHT EVENTS: Pt seen and examined. No acute overnight events. s/p tap 7/27 had 8L removed. Denies fevers, chills, CP, SOB, Abdominal pain, N/V, Constipation, Diarrhea        MEDICATIONS  (STANDING):  furosemide    Tablet 40 milliGRAM(s) Oral every 12 hours  heparin   Injectable 5000 Unit(s) SubCutaneous every 8 hours  polyethylene glycol 3350 17 Gram(s) Oral daily  prednisoLONE    3 mG/mL Solution (ORAPRED) 40 milliGRAM(s) Oral daily  spironolactone 100 milliGRAM(s) Oral two times a day    MEDICATIONS  (PRN):  acetaminophen     Tablet .. 650 milliGRAM(s) Oral every 6 hours PRN Temp greater or equal to 38C (100.4F), Mild Pain (1 - 3)  calamine/zinc oxide Lotion 1 Application(s) Topical daily PRN Rash and/or Itching      I&O's Summary    27 Jul 2023 07:01  -  28 Jul 2023 07:00  --------------------------------------------------------  IN: 0 mL / OUT: 650 mL / NET: -650 mL        Vital Signs Last 24 Hrs  T(C): 36.5 (28 Jul 2023 09:37), Max: 36.8 (27 Jul 2023 17:47)  T(F): 97.7 (28 Jul 2023 09:37), Max: 98.2 (27 Jul 2023 17:47)  HR: 78 (28 Jul 2023 09:37) (66 - 82)  BP: 132/68 (28 Jul 2023 09:37) (115/66 - 133/69)  BP(mean): --  RR: 19 (28 Jul 2023 09:37) (18 - 19)  SpO2: 100% (28 Jul 2023 09:37) (97% - 100%)    Parameters below as of 28 Jul 2023 09:37  Patient On (Oxygen Delivery Method): room air        CAPILLARY BLOOD GLUCOSE          PHYSICAL EXAM:  GENERAL: NAD,  resting comfortably  HEAD:  Atraumatic, Normocephalic  EYES: EOMI, PERRL, conjunctiva nl, scleral icterus bilaterally  NECK: No JVD  CHEST/LUNG: Clear to auscultation bilaterally; No wheeze  HEART: Regular rate and rhythm; No murmurs, rubs, or gallops  ABDOMEN: Abdomen soft and significantly less distended. Nontender. Reducible umbilical hernia; Bowel sounds present.   EXTREMITIES:  2+ Peripheral Pulses, 2+ pitting edema to lower extremities, No clubbing, cyanosis. Calves are non tender and without erythema or warmth.  PSYCH: AAOx3  NEUROLOGY: non-focal, moving all extremities, cranial nerves intact  SKIN: Multiple areas of excoriations to extremities with scabbing  lesions       LABS:                        11.2   9.51  )-----------( 69       ( 27 Jul 2023 05:20 )             33.4     Auto Eosinophil # x     / Auto Eosinophil % x     / Auto Neutrophil # x     / Auto Neutrophil % x     / BANDS % x        07-27    133<L>  |  98  |  16  ----------------------------<  114<H>  3.8   |  23  |  0.86    Ca    8.4      27 Jul 2023 05:20  Mg     1.60     07-27  Phos  3.5     07-27  TPro  6.6  /  Alb  3.0<L>  /  TBili  3.2<H>  /  DBili  x   /  AST  60<H>  /  ALT  29  /  AlkPhos  224<H>  07-27    PT/INR - ( 27 Jul 2023 05:20 )   PT: 16.8 sec;   INR: 1.52 ratio         PTT - ( 27 Jul 2023 05:20 )  PTT:37.5 sec      Urinalysis Basic - ( 27 Jul 2023 05:20 )    Color: x / Appearance: x / SG: x / pH: x  Gluc: 114 mg/dL / Ketone: x  / Bili: x / Urobili: x   Blood: x / Protein: x / Nitrite: x   Leuk Esterase: x / RBC: x / WBC x   Sq Epi: x / Non Sq Epi: x / Bacteria: x            RADIOLOGY & ADDITIONAL TESTS:    Imaging Personally Reviewed:    Consultant(s) Notes Reviewed:      Care Discussed with Consultants/Other Providers:

## 2023-07-28 NOTE — DISCHARGE NOTE NURSING/CASE MANAGEMENT/SOCIAL WORK - NSDCVIVACCINE_GEN_ALL_CORE_FT
Hep A, adult; 27-Jul-2023 17:48; Sugar Alvarez (RN); [employer]; 3J964 (Exp. Date: 30-Aug-2025); IntraMuscular; Deltoid Left.; 1 milliLiter(s); VIS (VIS Published: 15-Oct-2021, VIS Presented: 27-Jul-2023);

## 2023-07-28 NOTE — PROGRESS NOTE ADULT - ATTENDING COMMENTS
57M with PMH of AUD, ?prior DVT no longer on AC who is presenting with increasing abdominal distention and b/l LE edema secondary to acute decompensated alcoholic cirrhosis.     # AUD  # Alcoholic Hepatitis   # Acute decompensated alcoholic cirrhosis   - no h/o withdrawals, s/p CIWA protocol  - s/p paracentesis 7/20 with 16L removed; fluid analysis not consistent with SBP. repeat paracentesis 7/27 with 7L removed  - Hepatology following - s/p NAC protocol, d/c ursodiol and cholestyramine; initiated steroid on 7/21 - plan for 21 days of treatment; will take prednisone on d/c due to insurance coverage.   - Screening EGD as outpatient per hepatology     # + C diff - given that patient is having formed stool, suspect this is likely secondary to colonization, no need for ABX at this time. Appreciate ID recs.   # Anemia, Thrombocytopenia, Coagulopathy  - in setting of cirrhosis, continue to monitor.     Dispo: plan for d/c home with hepatology and IR follow up. d/c today d/c planning 40 min coordinating care.

## 2023-07-28 NOTE — PROGRESS NOTE ADULT - PROBLEM SELECTOR PLAN 9
sodium 135 > 133 > 131 > 134 > 133 > 135 > 133 (7/27). likely in setting of diuresis, could also have underlying SIADH.  -monitor sodium

## 2023-07-28 NOTE — PROGRESS NOTE ADULT - ATTENDING SUPERVISION STATEMENT
Fellow
Resident

## 2023-07-28 NOTE — DISCHARGE NOTE NURSING/CASE MANAGEMENT/SOCIAL WORK - PATIENT PORTAL LINK FT
You can access the FollowMyHealth Patient Portal offered by [facility] by registering at the following website: [email or web address]. By joining [employer]’s FollowMyHealth portal, you will also be able to view your health information using other applications (apps) compatible with our system.

## 2023-07-28 NOTE — PROGRESS NOTE ADULT - PROBLEM SELECTOR PROBLEM 3
Macrocytic anemia

## 2023-07-28 NOTE — PROGRESS NOTE ADULT - PROBLEM SELECTOR PLAN 2
Pt with elevated t bili to 9.0, d bili 4.5, alk phos 388, AST 91, and ALT 18. Elevated GGT suggestive of cholestatic disease. Concerning for cholestasis/biliary obstruction with component of hepatocellular injury/hepatitis 2/2 alcohol use.  -appreciate GI/Hepatology recs  -trend LFTs  -s/p loading dose NAC 150mg/kg/day. c/w maintenance dose 100mg/kg/day until 7/24  -s/p ursodiol 500mg BID  -c/w prednisolone 40mg daily, will continue as outpatient for total 28 days  -f/u bcx, NGTD Pt with elevated t bili to 9.0, d bili 4.5, alk phos 388, AST 91, and ALT 18. Elevated GGT suggestive of cholestatic disease. Concerning for cholestasis/biliary obstruction with component of hepatocellular injury/hepatitis 2/2 alcohol use.  -appreciate GI/Hepatology recs  -trend LFTs  -s/p loading dose NAC 150mg/kg/day. c/w maintenance dose 100mg/kg/day until 7/24  -s/p ursodiol 500mg BID  -c/w prednisolone 40mg daily, will continue prednisone 40mg daily as outpatient for total 28 days  -f/u bcx, NGTD

## 2023-07-28 NOTE — PROGRESS NOTE ADULT - PROBLEM SELECTOR PLAN 1
Pt with AUD p/w 1.5 weeks of increasing abdominal distention and b/l LE edema. Ascites and cirrhosis confirmed on US. s/p paracentesis with 16L removed. Patient feeling much better after having fluid removed. Peritoneal fluid showing 157 nucleated cell count r/o SBP.  Fluid gram stain w/ PML, no organisms. SAAG 1.8 - in setting of unremarkable TTE, ascites 2/2 portal hypertension, likely alcoholic cirrhosis. CT abd/pelvis w/ nodular liver. No evidence of PVT/Budd Chiari syndrome.   -GI/hepatology consulted appreciated recs  -s/p IV albumin 25% 100mg x3  -procedure team to repeat tap 7/27, plan for dc after  -c/w Lasix 40mg PO BID, spironolactone 100mg PO BID  -c/w Miralax daily  -EGD/transplant screen as outpatient Pt with AUD p/w 1.5 weeks of increasing abdominal distention and b/l LE edema. Ascites and cirrhosis confirmed on US. s/p paracentesis with 16L removed. Patient feeling much better after having fluid removed. Peritoneal fluid showing 157 nucleated cell count r/o SBP.  Fluid gram stain w/ PML, no organisms. SAAG 1.8 - in setting of unremarkable TTE, ascites 2/2 portal hypertension, likely alcoholic cirrhosis. CT abd/pelvis w/ nodular liver. No evidence of PVT/Budd Chiari syndrome.   -GI/hepatology consulted appreciated recs  -s/p IV albumin 25% 100mg   -s/p repeat tap 7/27 8L removed  -c/w Lasix 40mg PO BID, spironolactone 100mg PO BID  -c/w Miralax daily  -EGD/transplant screen as outpatient

## 2023-07-28 NOTE — DISCHARGE NOTE NURSING/CASE MANAGEMENT/SOCIAL WORK - NSDCPEFALRISK_GEN_ALL_CORE
For information on Fall & Injury Prevention, visit: [email or web address] OR  [email or web address] OR  https://www.cdc.gov/steadi/patient.html

## 2023-07-28 NOTE — PROGRESS NOTE ADULT - REASON FOR ADMISSION
Abdominal distention
abdominal swelling
Abdominal distention

## 2023-07-28 NOTE — PROGRESS NOTE ADULT - PROBLEM SELECTOR PLAN 12
diet: regular  dvt ppx: HSQ  code: full  dispo: pending clinical improvement diet: regular  dvt ppx: HSQ  code: full  dispo: rdy for dc

## 2023-08-09 PROBLEM — F10.10 ALCOHOL ABUSE, UNCOMPLICATED: Chronic | Status: ACTIVE | Noted: 2023-07-18

## 2023-08-22 ENCOUNTER — APPOINTMENT (OUTPATIENT)
Dept: HEPATOLOGY | Facility: CLINIC | Age: 58
End: 2023-08-22
Payer: COMMERCIAL

## 2023-08-22 VITALS
BODY MASS INDEX: 31.85 KG/M2 | HEIGHT: 69.6 IN | TEMPERATURE: 98.9 F | OXYGEN SATURATION: 96 % | SYSTOLIC BLOOD PRESSURE: 144 MMHG | DIASTOLIC BLOOD PRESSURE: 78 MMHG | WEIGHT: 220 LBS | HEART RATE: 91 BPM

## 2023-08-22 PROCEDURE — 99205 OFFICE O/P NEW HI 60 MIN: CPT

## 2023-08-25 ENCOUNTER — NON-APPOINTMENT (OUTPATIENT)
Age: 58
End: 2023-08-25

## 2023-08-25 ENCOUNTER — OUTPATIENT (OUTPATIENT)
Dept: OUTPATIENT SERVICES | Facility: HOSPITAL | Age: 58
LOS: 1 days | End: 2023-08-25
Payer: COMMERCIAL

## 2023-08-25 ENCOUNTER — APPOINTMENT (OUTPATIENT)
Age: 58
End: 2023-08-25
Payer: COMMERCIAL

## 2023-08-25 DIAGNOSIS — Z98.890 OTHER SPECIFIED POSTPROCEDURAL STATES: Chronic | ICD-10-CM

## 2023-08-25 DIAGNOSIS — K70.10 ALCOHOLIC HEPATITIS WITHOUT ASCITES: ICD-10-CM

## 2023-08-25 PROCEDURE — 76700 US EXAM ABDOM COMPLETE: CPT

## 2023-08-25 PROCEDURE — 76700 US EXAM ABDOM COMPLETE: CPT | Mod: 26

## 2023-08-30 ENCOUNTER — NON-APPOINTMENT (OUTPATIENT)
Age: 58
End: 2023-08-30

## 2023-08-30 NOTE — HISTORY OF PRESENT ILLNESS
[de-identified] : 57M with history of remote DVT no longer on AC,  Small L fat-containing inguinal hernia, umbilical hernia, AUD and Alcoholic Hepatitis with decompensated ETOH cirrhosis with ascites s/p multiple LVP ( 16L and  7L removed), SBP neg, with recent LHH for Acute decompensated ETOH cirr, presented with hyperbilirubinemia, jaundice, elevated LFTs and ascites in setting of heavy ETOH s/p NAC, completed 21 day course of prednisolone  - ., imaging cw cirrhosis  MELD 20 [ABO B+]  Ascites, LVP x 2, Lasix 40 mg bid/Aldactone 100mg bid  Sober since    LABS 23:            11.2 9.51  )-----------( 69                33.4  133<L>  |  98  |  16 ----------------------------<  114<H> 3.8   |  23  |  0.86  TB 3.2 TB 3.2 AST 60 ALT  29   AP  224 <H>  ALB 3 INR: 1.52 ratio  MEDICATION Lasix 40mg bid ** stopped taking diruetics on own since  3 days ago Aldactone 100mg bid pred 40mg daily (per notes was supposed to wean off )  STUDIES CT A/P IVC 23 FINDINGS: LOWER CHEST: Mild right middle lobe peripheral ground glass opacity. Trace left pleural effusion. LIVER:  Mildly nodular contour with medial segment atrophy consistent with cirrhosis. BILE DUCTS: Normal caliber. GALLBLADDER: Distended. Cholelithiasis. SPLEEN: Within normal limits. PANCREAS: Within normal limits. ADRENALS: Within normal limits. KIDNEYS/URETERS: Within normal limits. BLADDER: Within normal limits. REPRODUCTIVE ORGANS: Prostate size within normal limits. BOWEL: Colonic diverticulosis. No bowel obstruction. Appendix is normal. PERITONEUM: Moderate ascites. VESSELS: Diffusion-weighted lower paraesophageal, gastrohepatic, and paraumbilical venous collaterals. Portal and hepatic veins are patent. RETROPERITONEUM/LYMPH NODES: No lymphadenopathy. ABDOMINAL WALL: Fatty and soft tissue containing umbilical hernia. Small L fat-containing inguinal hernia. BONES: Degenerative changes.  IMPRESSION: Cirrhosis and portal hypertension with moderate abdominopelvic ascites.  No portal thrombosis. Portal and hepatic veins are patent.  No prior EGD  SURGICAL HX 3 L knee operations - 20 yrs old  FAM HX Grandparents/Father - drank alot of ETOH father, Ht attack, s/p 4x bipass mother heavy smoker, Lung cancer,   no hx liver disease in fam  SOCIAL HX ETOH 3-5 double [place] whiskey 5 days/week x 25 yrs, stopped beginnign of  Denies hx w/d'l   2 children has not worked since hospitalization, is a .  INTERVAL HX feels 'ok' just tired last Saturday felt tired after eating eggplant parm, slept all day  denies fever denies abdl pain, NVD, CP, SOB, f/c appettite good BM daily, no rectal blood denies confusion, forgetfulness denies overt bleeding

## 2023-08-30 NOTE — PHYSICAL EXAM
[Scleral Icterus] : Scleral Icterus noted [Jaundice] : Jaundice [General Appearance - Alert] : alert [General Appearance - In No Acute Distress] : in no acute distress [PERRL With Normal Accommodation] : pupils were equal in size, round, and reactive to light [Auscultation Breath Sounds / Voice Sounds] : lungs were clear to auscultation bilaterally [Heart Rate And Rhythm] : heart rate was normal and rhythm regular [Abdomen Soft] : soft [Abdomen Tenderness] : non-tender [Abnormal Walk] : normal gait [] : no rash [Oriented To Time, Place, And Person] : oriented to person, place, and time [Edema] : there was no peripheral edema [Spider Angioma] : No spider angioma(s) were observed [FreeTextEntry1] : abdl hernia, reduceable,  large abdlomen, ? ascites

## 2023-08-30 NOTE — ASSESSMENT
[FreeTextEntry1] : 58 yo M with decompestated ETOH cirr, recent hosp for acute decomp with ascites requirign LVP x 2, neg SBP.  #ETOH CIRR/#AH pt did not stop pred, taking pred 40mg daily will taper pred every 5 days (schedule of taper given to pt) pred taper: pred 30mg tomorrow (8/23) pred 20 mg (8/28) pred 15mg (Sept 2) pred 10 (sept 7) pred 7.5 (sept 12) pred 5mg (sept 17) pred 2.5 (Sept 22) d/c pred (sept 27) sober since July 3, 2023 maintain sobriety serial PEths considrer RPP consider liver transplant eval if no improvement in LFTs  #portal htn ascites, pt stopped diuretics on his own continue to hold diuretcis will get US to assess and decide if resume diuretics HE - none EV screening - can scd with Dr. Simental  #HCC screening CTAP 7/21/23 no HCC, DUE Jan, 2024  #labs reviewed labs next week  RTC 2 weeks

## 2023-09-05 ENCOUNTER — APPOINTMENT (OUTPATIENT)
Dept: HEPATOLOGY | Facility: CLINIC | Age: 58
End: 2023-09-05
Payer: COMMERCIAL

## 2023-09-05 ENCOUNTER — LABORATORY RESULT (OUTPATIENT)
Age: 58
End: 2023-09-05

## 2023-09-05 VITALS
HEIGHT: 68 IN | RESPIRATION RATE: 14 BRPM | OXYGEN SATURATION: 94 % | TEMPERATURE: 98.6 F | BODY MASS INDEX: 35.92 KG/M2 | WEIGHT: 237 LBS | DIASTOLIC BLOOD PRESSURE: 75 MMHG | SYSTOLIC BLOOD PRESSURE: 143 MMHG | HEART RATE: 110 BPM

## 2023-09-05 DIAGNOSIS — Z00.00 ENCOUNTER FOR GENERAL ADULT MEDICAL EXAMINATION W/OUT ABNORMAL FINDINGS: ICD-10-CM

## 2023-09-05 DIAGNOSIS — D72.9 DISORDER OF WHITE BLOOD CELLS, UNSPECIFIED: ICD-10-CM

## 2023-09-05 LAB
AFP-TM SERPL-MCNC: 2.8 NG/ML
ALBUMIN SERPL ELPH-MCNC: 3.2 G/DL
ALP BLD-CCNC: 376 U/L
ALT SERPL-CCNC: 67 U/L
ANION GAP SERPL CALC-SCNC: 11 MMOL/L
AST SERPL-CCNC: 78 U/L
BILIRUB SERPL-MCNC: 4.3 MG/DL
BUN SERPL-MCNC: 15 MG/DL
CALCIUM SERPL-MCNC: 8.5 MG/DL
CHLORIDE SERPL-SCNC: 95 MMOL/L
CO2 SERPL-SCNC: 24 MMOL/L
CREAT SERPL-MCNC: 0.76 MG/DL
EGFR: 105 ML/MIN/1.73M2
GLUCOSE SERPL-MCNC: 153 MG/DL
INR PPP: 1.32 RATIO
PETH 16:0/18:1: 41 NG/ML
PETH 16:0/18:2: 74 NG/ML
PETH COMMENTS: NORMAL
POTASSIUM SERPL-SCNC: 4.1 MMOL/L
PROT SERPL-MCNC: 6.5 G/DL
PT BLD: 14.8 SEC
SODIUM SERPL-SCNC: 130 MMOL/L

## 2023-09-05 PROCEDURE — 99214 OFFICE O/P EST MOD 30 MIN: CPT

## 2023-09-08 ENCOUNTER — NON-APPOINTMENT (OUTPATIENT)
Age: 58
End: 2023-09-08

## 2023-09-08 LAB
AFP-TM SERPL-MCNC: 3.2 NG/ML
ALBUMIN SERPL ELPH-MCNC: 3.5 G/DL
ALP BLD-CCNC: 347 U/L
ALT SERPL-CCNC: 48 U/L
ANION GAP SERPL CALC-SCNC: 12 MMOL/L
APPEARANCE: CLEAR
AST SERPL-CCNC: 78 U/L
BASOPHILS # BLD AUTO: 0.05 K/UL
BASOPHILS NFR BLD AUTO: 0.3 %
BILIRUB SERPL-MCNC: 4.8 MG/DL
BILIRUBIN URINE: NEGATIVE
BLOOD URINE: NEGATIVE
BUN SERPL-MCNC: 11 MG/DL
CALCIUM SERPL-MCNC: 9.2 MG/DL
CHLORIDE SERPL-SCNC: 90 MMOL/L
CO2 SERPL-SCNC: 28 MMOL/L
COLOR: YELLOW
CREAT SERPL-MCNC: 0.88 MG/DL
EGFR: 100 ML/MIN/1.73M2
EOSINOPHIL # BLD AUTO: 0.06 K/UL
EOSINOPHIL NFR BLD AUTO: 0.4 %
FERRITIN SERPL-MCNC: 1101 NG/ML
GGT SERPL-CCNC: 414 U/L
GLUCOSE QUALITATIVE U: NEGATIVE MG/DL
GLUCOSE SERPL-MCNC: 102 MG/DL
HBV CORE IGG+IGM SER QL: NONREACTIVE
HBV SURFACE AB SERPL IA-ACNC: 19.9 MIU/ML
HBV SURFACE AG SER QL: NONREACTIVE
HCT VFR BLD CALC: 38.7 %
HCV AB SER QL: NONREACTIVE
HCV S/CO RATIO: 0.11 S/CO
HEPATITIS A IGG ANTIBODY: NONREACTIVE
HGB BLD-MCNC: 13.6 G/DL
IMM GRANULOCYTES NFR BLD AUTO: 0.6 %
INR PPP: 1.3 RATIO
IRON SATN MFR SERPL: 56 %
IRON SERPL-MCNC: 115 UG/DL
KETONES URINE: NEGATIVE MG/DL
LEUKOCYTE ESTERASE URINE: NEGATIVE
LYMPHOCYTES # BLD AUTO: 0.69 K/UL
LYMPHOCYTES NFR BLD AUTO: 4.6 %
MAN DIFF?: NORMAL
MCHC RBC-ENTMCNC: 35.1 GM/DL
MCHC RBC-ENTMCNC: 35.3 PG
MCV RBC AUTO: 100.5 FL
MONOCYTES # BLD AUTO: 0.82 K/UL
MONOCYTES NFR BLD AUTO: 5.5 %
NEUTROPHILS # BLD AUTO: 13.16 K/UL
NEUTROPHILS NFR BLD AUTO: 88.6 %
NITRITE URINE: NEGATIVE
PH URINE: 6.5
PLATELET # BLD AUTO: 84 K/UL
POTASSIUM SERPL-SCNC: 3.9 MMOL/L
POTASSIUM UR-SCNC: 35 MMOL/L
PROT SERPL-MCNC: 6.8 G/DL
PROTEIN URINE: NEGATIVE MG/DL
PT BLD: 14.7 SEC
RBC # BLD: 3.85 M/UL
RBC # FLD: 15.2 %
SODIUM ?TM SUB UR QN: 100 MMOL/L
SODIUM SERPL-SCNC: 130 MMOL/L
SPECIFIC GRAVITY URINE: 1.01
TIBC SERPL-MCNC: 205 UG/DL
UIBC SERPL-MCNC: 90 UG/DL
UROBILINOGEN URINE: 0.2 MG/DL
WBC # FLD AUTO: 14.87 K/UL

## 2023-09-08 NOTE — PHYSICAL EXAM
[Scleral Icterus] : Scleral Icterus noted [Abdominal  Ascites] : ascites [Ascites Fluid Wave] : positive ascites fluid wave [Ascites Tense] : ascites is tense [General Appearance - Alert] : alert [General Appearance - In No Acute Distress] : in no acute distress [Spider Angioma] : No spider angioma(s) were observed [Caput Medusae] : no caput medusae observed [Asterixis] : no asterixis observed [Jaundice] : No jaundice [Palmar Erythema] : no Palmar Erythema [FreeTextEntry4] : Umbilical hernia

## 2023-09-08 NOTE — ASSESSMENT
[FreeTextEntry1] : Darrell Tang is a 58y male with ETOH cirrhosis and Alcohol use disorder who presents for follow-up. Pt was admitted [facility] (7/18/23-7/28/23) due to New ascites s/p LVPx2 (SBP-) and found to have cirrhosis and C.diff (no diarrhea, Abx+). His lower extremity swelling, he underwent US Doppler of his lower extremities (inpt), which showed no DVT. - Alcohol: ETOH 3-5 double [place] whiskey 5 days/week x 25 yrs, stopped beginning of July, 2023. *Today he reports he drank one sip of red wine on his son's birthday.   [Plan]  # Liver transplantation Age 58 Blood type B+ MELD 3.0=19 Child Sheikh C - He had an echocardiogram in 7/2023 at [facility] which showed mild diastolic dysfunction and EF 61%. - Referred pt to GI for EGD & Colonoscopy - Will refer pt to liver transplant evaluation based on next blood work - Will calculate MELD. - Labs today  - RTO in 1-2 weeks with Dr. Simental  # ETOH cirrhosis - Education and counseling were provided to the patient. - Discussed at length with the patient that next course of action would depend on results - Advised to avoid NSAIDs, ok to take Tylenol not exceeding 2000mg/ 24hrs - Reinforce the importance of abstaining from ETOH - Recommended to attend alcohol relapse prevention program  - Continue prednisone 10mg - Plan for pred taper: pred 30mg (8/23) pred 20 mg (8/28) pred 15mg (Sept 2) *pred 10 (sept 7) pred 7.5 (sept 12) pred 5mg (sept 17) pred 2.5 (Sept 22) d/c pred (sept 27).   # WBC 16.14 8/28/23 (prev 11.2) # LFTs went up except INR  (7/27/23) TB 3.2 AST/ALT 60/29  INR 1.52 (8/28/23) TB 4.3 AST/ALT 78/69  INR 1.32 DDX: due to ETOH vs infection - Pt denies any recent travel or skin rash. Pt denies being in contact with someone who was exposed to Covid or cold.   - repeat CBC - send U/A & B/C (if possible) - stop drinking alcohol   #HCC screening - Latest US abd 8/25/23:No liver lesion - Normal AFP - Imaging q6months  #Ascites+ # Umbilical hernia+ Last LVP in 7/2023 - getting distended since he stopped diuretics - Continue Furosemide 40mg 2T daily (Pt started 2 days ago) - Restart Spironolactone 100mg 2T daily. - Advised to stick to low sodium diet <2g - Will send urine Na &K  #Hyponatremia - Na 130 - Free water restriction <1L/day - Repeat CMP  #Varices (?) - No prior EGD & Colonoscopy - Referred pt to GI via [facility] for EGD &Colonoscopy  # No HE - A&x3, no asterixis  # Health Maintenance - Screen hepatitis A, B, C

## 2023-09-08 NOTE — ADDENDUM
[FreeTextEntry1] : 9/5/23 MELD 3.0=19 AFP 3.2 INR 1.3 WBC 14.87 RBC 3.85 Hb 13.6 .5 PLT 84  Na 130 K 3.9 Cr 0.88 Alb 3.5 TB 4.8 AST/ALT 78/48   Iron  sat%56 Ferritin 1101  Negative: U/A, Urine K, Urine Na B/C canceled due to outpt  - K 3.9 it is likely due to pt did not take spironolactone until the blood work. Need to recheck @Next visit since he was advised to restart spironolactone.  - Discussed with Dr. Rider regarding further plan - We suspect one of three:  1. PVT 2. Alcohol (he is under reporting the use--check PETH) 3. SBP  - US abd 8/25/23 showed Portal vein patent with normal direction of flow.  - Despite diuretics, he ascites and labs not better, I will send him to IR for diagnostic tap - I phoned the patient today and left a message for a call back.  - Will do ultrasound doppler if necessary  - RTO as scheduled with Dr. Simental  ______________________ # [place] Maintenance Non-immune to Hep A Immune to Hep B due to vaccination (HBsAb 19.9) Hep C Ab Negative (From labs 9/2023)

## 2023-09-08 NOTE — REVIEW OF SYSTEMS
[Limb Swelling] : limb swelling [Fever] : no fever [Chills] : no chills [Feeling Tired] : not feeling tired [Eye Pain] : no eye pain [Earache] : no earache [Chest Pain] : no chest pain [Palpitations] : no palpitations [Shortness Of Breath] : no shortness of breath [Abdominal Pain] : no abdominal pain [Vomiting] : no vomiting [Constipation] : no constipation [Diarrhea] : no diarrhea [Heartburn] : no heartburn [Melena] : no melena [Limb Pain] : no limb pain [Itching] : no itching [Confused] : no confusion [Dizziness] : no dizziness [Fainting] : no fainting [Anxiety] : no anxiety [Easy Bleeding] : no tendency for easy bleeding [FreeTextEntry7] : abd distension

## 2023-09-08 NOTE — HISTORY OF PRESENT ILLNESS
[FreeTextEntry1] : Darrell Tang is a 58y male with ETOH cirrhosis and Alcohol use disorder who presents for follow-up. Pt was admitted [facility] (23-23) due to New ascites s/p LVPx2 (SBP-) and found to have cirrhosis and C.diff (no diarrhea, Abx+). His lower extremity swelling, he underwent US Doppler of his lower extremities (inpt), which showed no DVT.  - Alcohol: ETOH 3-5 double [place] whiskey 5 days/week x 25 yrs, stopped beginnign of . *Today he reports he drank one sip of red wine on his son's birthday.  - MELD 3.0= 19  MDF 17.2 good prognosis.  - Prednisone has been tappered since his last visit (23). pt is Currently taking pred 10mg 1T  daily  - Plan for pred taper: pred 30mg () pred 20 mg () pred 15mg () *pred 10 () pred 7.5 () pred 5mg () pred 2.5 () d/c pred ().  - Today he reports abd distention since he stopped diuretics. He restarted furoseminde 40mg BID only 2 days ago. umbilical hernia+ on PE. He used to take furoseminde 40mg BID & spironolactone 100mg BID. Pt denies fever, chills, N/V/D, SOB, CP, bleeding, or abd pain.   [Other Medical Hx] HTN (currently not one med), Pt was seen by PCP before , history of remote DVT no longer on AC, Small L fat-containing inguinal hernia, umbilical hernia [Surgical Hx] 3 L knee operations - 20 yrs old [Social Hx]   2 children has not worked since hospitalization, is a . [FMH of liver disease] None [Other FMH] Grandparents/Father - drank alot of ETOH father, Ht attack, s/p 4x bipass mother heavy smoker, Lung cancer,    [Labs] 23 MELD 3.0=19 WBC 16.14 Hb 13.2 .5  PT 14.8 INR 1.32 Na 130 K 4.1 Glucose 153 Cr 0.76 Alb 3.2 TB 4.3 AST/ALT 78/67  Peth 41  MELD 20 in 2023  [Imaging] US abd 23: Cirrhosis, splenomegaly. Small volume perihepatic and perisplenic ascites. Sludge in gallbladder neck.  CT A/P IVC 23 FINDINGS: LOWER CHEST: Mild right middle lobe peripheral ground glass opacity. Trace left pleural effusion. LIVER: Mildly nodular contour with medial segment atrophy consistent with cirrhosis. BILE DUCTS: Normal caliber. GALLBLADDER: Distended. Cholelithiasis. SPLEEN PANCREAS ADRENALS KIDNEYS/URETERS BLADDER: Within normal limits. REPRODUCTIVE ORGANS: Prostate size within normal limits. BOWEL: Colonic diverticulosis. No bowel obstruction. Appendix is normal. PERITONEUM: Moderate ascites. VESSELS: Diffusion-weighted lower paraesophageal, gastrohepatic, and paraumbilical venous collaterals. Portal and hepatic veins are patent. RETROPERITONEUM/LYMPH NODES: No lymphadenopathy. ABDOMINAL WALL: Fatty and soft tissue containing umbilical hernia. Small L fat-containing inguinal hernia. BONES: Degenerative changes.  [Procedures] No prior EGD No prior Colonoscopy

## 2023-09-09 ENCOUNTER — NON-APPOINTMENT (OUTPATIENT)
Age: 58
End: 2023-09-09

## 2023-09-09 LAB
PETH 16:0/18:1: 88 NG/ML
PETH 16:0/18:2: 133 NG/ML
PETH COMMENTS: NORMAL

## 2023-09-12 LAB — BACTERIA BLD CULT: NORMAL

## 2023-09-12 RX ORDER — PREDNISONE 10 MG/1
10 TABLET ORAL
Qty: 475 | Refills: 0 | Status: DISCONTINUED | COMMUNITY
Start: 2023-08-22 | End: 2023-09-12

## 2023-09-15 ENCOUNTER — APPOINTMENT (OUTPATIENT)
Dept: GASTROENTEROLOGY | Facility: CLINIC | Age: 58
End: 2023-09-15
Payer: COMMERCIAL

## 2023-09-15 VITALS
WEIGHT: 237 LBS | DIASTOLIC BLOOD PRESSURE: 79 MMHG | SYSTOLIC BLOOD PRESSURE: 147 MMHG | HEIGHT: 68 IN | BODY MASS INDEX: 35.92 KG/M2 | HEART RATE: 71 BPM | TEMPERATURE: 97.1 F | OXYGEN SATURATION: 100 %

## 2023-09-15 PROCEDURE — 99214 OFFICE O/P EST MOD 30 MIN: CPT

## 2023-09-19 ENCOUNTER — OUTPATIENT (OUTPATIENT)
Dept: OUTPATIENT SERVICES | Facility: HOSPITAL | Age: 58
LOS: 1 days | End: 2023-09-19
Payer: COMMERCIAL

## 2023-09-19 ENCOUNTER — TRANSCRIPTION ENCOUNTER (OUTPATIENT)
Age: 58
End: 2023-09-19

## 2023-09-19 VITALS
SYSTOLIC BLOOD PRESSURE: 177 MMHG | OXYGEN SATURATION: 99 % | RESPIRATION RATE: 18 BRPM | TEMPERATURE: 98 F | HEART RATE: 88 BPM | DIASTOLIC BLOOD PRESSURE: 85 MMHG

## 2023-09-19 DIAGNOSIS — Z98.890 OTHER SPECIFIED POSTPROCEDURAL STATES: Chronic | ICD-10-CM

## 2023-09-19 DIAGNOSIS — R18.8 OTHER ASCITES: ICD-10-CM

## 2023-09-19 LAB
ALBUMIN FLD-MCNC: 0.9 G/DL — SIGNIFICANT CHANGE UP
B PERT IGG+IGM PNL SER: CLEAR — SIGNIFICANT CHANGE UP
COLOR FLD: YELLOW
FLUID INTAKE SUBSTANCE CLASS: SIGNIFICANT CHANGE UP
GLUCOSE FLD-MCNC: 129 MG/DL — SIGNIFICANT CHANGE UP
LDH SERPL L TO P-CCNC: 62 U/L — SIGNIFICANT CHANGE UP
LYMPHOCYTES # FLD: 74 % — SIGNIFICANT CHANGE UP
MESOTHL CELL # FLD: 4 % — SIGNIFICANT CHANGE UP
MONOS+MACROS # FLD: 2 % — SIGNIFICANT CHANGE UP
NEUTROPHILS-BODY FLUID: 20 % — SIGNIFICANT CHANGE UP
PROT FLD-MCNC: 1.6 G/DL — SIGNIFICANT CHANGE UP
RCV VOL RI: <2000 CELLS/UL — HIGH (ref 0–5)
TOTAL NUCLEATED CELL COUNT, BODY FLUID: 37 CELLS/UL — HIGH (ref 0–5)
TUBE TYPE: SIGNIFICANT CHANGE UP

## 2023-09-19 PROCEDURE — 87205 SMEAR GRAM STAIN: CPT

## 2023-09-19 PROCEDURE — 88112 CYTOPATH CELL ENHANCE TECH: CPT | Mod: 26

## 2023-09-19 PROCEDURE — 89051 BODY FLUID CELL COUNT: CPT

## 2023-09-19 PROCEDURE — 87075 CULTR BACTERIA EXCEPT BLOOD: CPT

## 2023-09-19 PROCEDURE — 88305 TISSUE EXAM BY PATHOLOGIST: CPT | Mod: 26

## 2023-09-19 PROCEDURE — 82042 OTHER SOURCE ALBUMIN QUAN EA: CPT

## 2023-09-19 PROCEDURE — 84157 ASSAY OF PROTEIN OTHER: CPT

## 2023-09-19 PROCEDURE — 82945 GLUCOSE OTHER FLUID: CPT

## 2023-09-19 PROCEDURE — 49083 ABD PARACENTESIS W/IMAGING: CPT

## 2023-09-19 PROCEDURE — 83615 LACTATE (LD) (LDH) ENZYME: CPT

## 2023-09-19 PROCEDURE — P9047: CPT

## 2023-09-19 PROCEDURE — 88305 TISSUE EXAM BY PATHOLOGIST: CPT

## 2023-09-19 PROCEDURE — 87070 CULTURE OTHR SPECIMN AEROBIC: CPT

## 2023-09-19 PROCEDURE — 88112 CYTOPATH CELL ENHANCE TECH: CPT

## 2023-09-19 PROCEDURE — C1729: CPT

## 2023-09-19 PROCEDURE — 87102 FUNGUS ISOLATION CULTURE: CPT

## 2023-09-19 RX ORDER — GARLIC 1000 MG
0 CAPSULE ORAL
Refills: 0 | DISCHARGE

## 2023-09-19 RX ORDER — ALBUMIN HUMAN 25 %
50 VIAL (ML) INTRAVENOUS ONCE
Refills: 0 | Status: COMPLETED | OUTPATIENT
Start: 2023-09-19 | End: 2023-09-19

## 2023-09-19 RX ORDER — CINNAMON BARK 500 MG
0 CAPSULE ORAL
Refills: 0 | DISCHARGE

## 2023-09-19 RX ADMIN — Medication 50 MILLILITER(S): at 09:10

## 2023-09-19 NOTE — H&P ADULT - HISTORY OF PRESENT ILLNESS
Interventional Radiology    HPI: 58y Male with etoh cirrhosis presents to IR for therapeutic paracentesis.     Review of Systems:   Constitutional: No fever, weight loss, or fatigue  Neurological: No headaches, memory loss, loss of strength, numbness, or tremors  Respiratory: No cough, wheezing, chills; No shortness of breath    Allergies: cortisone (Swelling; Rash)    Medications (Abx/Cardiac/Anticoagulation/Blood Products)      Data:    T(C): --  HR: --  BP: --  RR: --  SpO2: --            Physical Exam  General: No acute distress, nontoxic, A&Ox3  Chest: Non labored breathing  Abdomen: Distended, non-tender, no preitoneal signs    RADIOLOGY & ADDITIONAL TESTS:    Imaging Reviewed    Plan: 58y Male presents for paracentesis  -Risks/Benefits/alternatives explained with the patient and/or healthcare proxy and witnessed informed consent obtained.

## 2023-09-19 NOTE — ASU DISCHARGE PLAN (ADULT/PEDIATRIC) - ASU DC SPECIAL INSTRUCTIONSFT
Paracentesis    Discharge Instructions  - You have had a paracentesis.  - You may shower in 24 hours.  - Keep the area covered and dry for the next 24 hours.  - Do not perform any heavy lifting until the site is healed.  - You may resume your normal diet.  - You may resume your normal medications however you should wait 24 hours before restarting aspirin, plavix, or blood thinners.  - It is normal to experience some pain over the site for the next few days. You may take apply ice to the area (20 minutes on, 20 minutes off) and take Tylenol for that pain. Do not take more frequently than every 6 hours and do not exceed more than 3000mg of Tylenol in a 24 hour period.    Notify your primary physician and/or Interventional Radiology IMMEDIATELY if you experience any of the following       - Fever of 100.4F or 38C       - Chills or Rigors/ Shakes       - Swelling and/or Redness in the area around the biopsy site       - Worsening Pain       - Blood soaked bandages or worsening bleeding       - Lightheadedness and/or dizziness upon standing       - Chest Pain/ Tightness       - Shortness of Breath       - Difficulty walking    If you have a problem that you believe requires IMMEDIATE attention, please go to your NEAREST Emergency Room. If you believe your problem can safely wait until you speak to a physician, please call Interventional Radiology for any concerns.    Please feel free to contact us at [phone] if any problems arise. After 6PM, Monday through Friday, on weekends and on holidays, please call [phone] and ask for the radiology resident on call to be paged.

## 2023-09-19 NOTE — ASU DISCHARGE PLAN (ADULT/PEDIATRIC) - NS MD DC FALL RISK RISK
For information on Fall & Injury Prevention, visit: [email or web address] OR  [email or web address] OR  https://www.cdc.gov/steadi/patient.html

## 2023-09-20 ENCOUNTER — APPOINTMENT (OUTPATIENT)
Dept: HEPATOLOGY | Facility: CLINIC | Age: 58
End: 2023-09-20
Payer: COMMERCIAL

## 2023-09-20 VITALS
BODY MASS INDEX: 31.67 KG/M2 | OXYGEN SATURATION: 96 % | HEIGHT: 68 IN | RESPIRATION RATE: 15 BRPM | WEIGHT: 209 LBS | HEART RATE: 91 BPM | DIASTOLIC BLOOD PRESSURE: 75 MMHG | TEMPERATURE: 97.2 F | SYSTOLIC BLOOD PRESSURE: 121 MMHG

## 2023-09-20 LAB
GRAM STN FLD: SIGNIFICANT CHANGE UP
SPECIMEN SOURCE: SIGNIFICANT CHANGE UP

## 2023-09-20 PROCEDURE — 99214 OFFICE O/P EST MOD 30 MIN: CPT

## 2023-09-21 ENCOUNTER — NON-APPOINTMENT (OUTPATIENT)
Age: 58
End: 2023-09-21

## 2023-09-21 LAB
ABO + RH PNL BLD: NORMAL
ALBUMIN SERPL ELPH-MCNC: 3.4 G/DL
ALP BLD-CCNC: 335 U/L
ALT SERPL-CCNC: 42 U/L
ANION GAP SERPL CALC-SCNC: 13 MMOL/L
AST SERPL-CCNC: 58 U/L
BASOPHILS # BLD AUTO: 0.06 K/UL
BASOPHILS NFR BLD AUTO: 0.5 %
BILIRUB SERPL-MCNC: 6 MG/DL
BUN SERPL-MCNC: 12 MG/DL
CALCIUM SERPL-MCNC: 8.4 MG/DL
CHLORIDE SERPL-SCNC: 90 MMOL/L
CO2 SERPL-SCNC: 24 MMOL/L
CREAT SERPL-MCNC: 0.91 MG/DL
EGFR: 98 ML/MIN/1.73M2
EOSINOPHIL # BLD AUTO: 0.12 K/UL
EOSINOPHIL NFR BLD AUTO: 1 %
FERRITIN SERPL-MCNC: 929 NG/ML
GGT SERPL-CCNC: 531 U/L
GLUCOSE SERPL-MCNC: 116 MG/DL
HCT VFR BLD CALC: 41.7 %
HGB BLD-MCNC: 14 G/DL
IMM GRANULOCYTES NFR BLD AUTO: 0.9 %
INR PPP: 1.41 RATIO
IRON SATN MFR SERPL: 46 %
IRON SERPL-MCNC: 97 UG/DL
LYMPHOCYTES # BLD AUTO: 1.02 K/UL
LYMPHOCYTES NFR BLD AUTO: 8.7 %
MAN DIFF?: NORMAL
MCHC RBC-ENTMCNC: 33.3 PG
MCHC RBC-ENTMCNC: 33.6 GM/DL
MCV RBC AUTO: 99.3 FL
MONOCYTES # BLD AUTO: 0.83 K/UL
MONOCYTES NFR BLD AUTO: 7.1 %
NEUTROPHILS # BLD AUTO: 9.61 K/UL
NEUTROPHILS NFR BLD AUTO: 81.8 %
PLATELET # BLD AUTO: 105 K/UL
POTASSIUM SERPL-SCNC: 5 MMOL/L
PROT SERPL-MCNC: 6.9 G/DL
PT BLD: 15.8 SEC
RBC # BLD: 4.2 M/UL
RBC # FLD: 15.6 %
SODIUM SERPL-SCNC: 127 MMOL/L
TIBC SERPL-MCNC: 210 UG/DL
UIBC SERPL-MCNC: 113 UG/DL
WBC # FLD AUTO: 11.75 K/UL

## 2023-09-24 LAB
CULTURE RESULTS: SIGNIFICANT CHANGE UP
SPECIMEN SOURCE: SIGNIFICANT CHANGE UP

## 2023-09-25 ENCOUNTER — APPOINTMENT (OUTPATIENT)
Dept: HEART AND VASCULAR | Facility: CLINIC | Age: 58
End: 2023-09-25

## 2023-09-25 ENCOUNTER — NON-APPOINTMENT (OUTPATIENT)
Age: 58
End: 2023-09-25

## 2023-09-25 LAB
NON-GYNECOLOGICAL CYTOLOGY STUDY: SIGNIFICANT CHANGE UP
PETH 16:0/18:1: 20 NG/ML
PETH 16:0/18:2: NEGATIVE NG/ML
PETH COMMENTS: NORMAL

## 2023-09-26 ENCOUNTER — APPOINTMENT (OUTPATIENT)
Dept: OTOLARYNGOLOGY | Facility: CLINIC | Age: 58
End: 2023-09-26
Payer: COMMERCIAL

## 2023-09-26 VITALS
HEART RATE: 90 BPM | WEIGHT: 209 LBS | SYSTOLIC BLOOD PRESSURE: 142 MMHG | BODY MASS INDEX: 31.67 KG/M2 | DIASTOLIC BLOOD PRESSURE: 80 MMHG | HEIGHT: 68 IN

## 2023-09-26 DIAGNOSIS — H90.3 SENSORINEURAL HEARING LOSS, BILATERAL: ICD-10-CM

## 2023-09-26 DIAGNOSIS — H93.293 OTHER ABNORMAL AUDITORY PERCEPTIONS, BILATERAL: ICD-10-CM

## 2023-09-26 DIAGNOSIS — H93.8X9 OTHER SPECIFIED DISORDERS OF EAR, UNSPECIFIED EAR: ICD-10-CM

## 2023-09-26 PROCEDURE — 92557 COMPREHENSIVE HEARING TEST: CPT

## 2023-09-26 PROCEDURE — 92567 TYMPANOMETRY: CPT

## 2023-09-26 PROCEDURE — 99203 OFFICE O/P NEW LOW 30 MIN: CPT | Mod: 25

## 2023-09-26 PROCEDURE — G0268 REMOVAL OF IMPACTED WAX MD: CPT

## 2023-09-28 ENCOUNTER — APPOINTMENT (OUTPATIENT)
Dept: HEPATOLOGY | Facility: CLINIC | Age: 58
End: 2023-09-28
Payer: COMMERCIAL

## 2023-09-28 VITALS
BODY MASS INDEX: 31.98 KG/M2 | RESPIRATION RATE: 14 BRPM | WEIGHT: 211 LBS | OXYGEN SATURATION: 95 % | TEMPERATURE: 97.3 F | DIASTOLIC BLOOD PRESSURE: 67 MMHG | HEART RATE: 96 BPM | HEIGHT: 68 IN | SYSTOLIC BLOOD PRESSURE: 139 MMHG

## 2023-09-28 DIAGNOSIS — R10.13 EPIGASTRIC PAIN: ICD-10-CM

## 2023-09-28 PROCEDURE — 99214 OFFICE O/P EST MOD 30 MIN: CPT

## 2023-09-29 ENCOUNTER — EMERGENCY (EMERGENCY)
Facility: HOSPITAL | Age: 58
LOS: 1 days | Discharge: HOME | End: 2023-09-29
Attending: EMERGENCY MEDICINE
Payer: COMMERCIAL

## 2023-09-29 VITALS
WEIGHT: 210.98 LBS | HEART RATE: 94 BPM | RESPIRATION RATE: 18 BRPM | SYSTOLIC BLOOD PRESSURE: 96 MMHG | DIASTOLIC BLOOD PRESSURE: 65 MMHG | HEIGHT: 68 IN | OXYGEN SATURATION: 100 % | TEMPERATURE: 98 F

## 2023-09-29 DIAGNOSIS — Z98.890 OTHER SPECIFIED POSTPROCEDURAL STATES: Chronic | ICD-10-CM

## 2023-09-29 LAB
ALBUMIN SERPL ELPH-MCNC: 2.7 G/DL — LOW (ref 3.5–5)
ALP SERPL-CCNC: 295 U/L — HIGH (ref 40–120)
ALT FLD-CCNC: 49 U/L DA — SIGNIFICANT CHANGE UP (ref 10–60)
ANION GAP SERPL CALC-SCNC: 13 MMOL/L — SIGNIFICANT CHANGE UP (ref 5–17)
AST SERPL-CCNC: 96 U/L — HIGH (ref 10–40)
BASOPHILS # BLD AUTO: 0.13 K/UL — SIGNIFICANT CHANGE UP (ref 0–0.2)
BASOPHILS NFR BLD AUTO: 0.6 % — SIGNIFICANT CHANGE UP (ref 0–2)
BILIRUB SERPL-MCNC: 6.4 MG/DL — HIGH (ref 0.2–1.2)
BUN SERPL-MCNC: 20 MG/DL — HIGH (ref 7–18)
CALCIUM SERPL-MCNC: 9.7 MG/DL — SIGNIFICANT CHANGE UP (ref 8.4–10.5)
CHLORIDE SERPL-SCNC: 93 MMOL/L — LOW (ref 96–108)
CO2 SERPL-SCNC: 24 MMOL/L — SIGNIFICANT CHANGE UP (ref 22–31)
CREAT SERPL-MCNC: 1.22 MG/DL — SIGNIFICANT CHANGE UP (ref 0.5–1.3)
EGFR: 69 ML/MIN/1.73M2 — SIGNIFICANT CHANGE UP
EOSINOPHIL # BLD AUTO: 0.07 K/UL — SIGNIFICANT CHANGE UP (ref 0–0.5)
EOSINOPHIL NFR BLD AUTO: 0.3 % — SIGNIFICANT CHANGE UP (ref 0–6)
GLUCOSE SERPL-MCNC: 153 MG/DL — HIGH (ref 70–99)
HCT VFR BLD CALC: 38.6 % — LOW (ref 39–50)
HGB BLD-MCNC: 13.6 G/DL — SIGNIFICANT CHANGE UP (ref 13–17)
IMM GRANULOCYTES NFR BLD AUTO: 0.7 % — SIGNIFICANT CHANGE UP (ref 0–0.9)
LACTATE SERPL-SCNC: 3.9 MMOL/L — HIGH (ref 0.7–2)
LIDOCAIN IGE QN: 50 U/L — SIGNIFICANT CHANGE UP (ref 13–75)
LYMPHOCYTES # BLD AUTO: 1.12 K/UL — SIGNIFICANT CHANGE UP (ref 1–3.3)
LYMPHOCYTES # BLD AUTO: 5.6 % — LOW (ref 13–44)
MCHC RBC-ENTMCNC: 34.3 PG — HIGH (ref 27–34)
MCHC RBC-ENTMCNC: 35.2 GM/DL — SIGNIFICANT CHANGE UP (ref 32–36)
MCV RBC AUTO: 97.5 FL — SIGNIFICANT CHANGE UP (ref 80–100)
MONOCYTES # BLD AUTO: 1.03 K/UL — HIGH (ref 0–0.9)
MONOCYTES NFR BLD AUTO: 5.1 % — SIGNIFICANT CHANGE UP (ref 2–14)
NEUTROPHILS # BLD AUTO: 17.57 K/UL — HIGH (ref 1.8–7.4)
NEUTROPHILS NFR BLD AUTO: 87.7 % — HIGH (ref 43–77)
NRBC # BLD: 0 /100 WBCS — SIGNIFICANT CHANGE UP (ref 0–0)
PLATELET # BLD AUTO: 88 K/UL — LOW (ref 150–400)
POTASSIUM SERPL-MCNC: 4.7 MMOL/L — SIGNIFICANT CHANGE UP (ref 3.5–5.3)
POTASSIUM SERPL-SCNC: 4.7 MMOL/L — SIGNIFICANT CHANGE UP (ref 3.5–5.3)
PROT SERPL-MCNC: 7.1 G/DL — SIGNIFICANT CHANGE UP (ref 6–8.3)
RBC # BLD: 3.96 M/UL — LOW (ref 4.2–5.8)
RBC # FLD: 14.5 % — SIGNIFICANT CHANGE UP (ref 10.3–14.5)
SODIUM SERPL-SCNC: 130 MMOL/L — LOW (ref 135–145)
TROPONIN I, HIGH SENSITIVITY RESULT: 13.4 NG/L — SIGNIFICANT CHANGE UP
WBC # BLD: 20.06 K/UL — HIGH (ref 3.8–10.5)
WBC # FLD AUTO: 20.06 K/UL — HIGH (ref 3.8–10.5)

## 2023-09-29 PROCEDURE — 99285 EMERGENCY DEPT VISIT HI MDM: CPT

## 2023-09-29 RX ORDER — FAMOTIDINE 10 MG/ML
20 INJECTION INTRAVENOUS ONCE
Refills: 0 | Status: COMPLETED | OUTPATIENT
Start: 2023-09-29 | End: 2023-09-29

## 2023-09-29 RX ORDER — ONDANSETRON 8 MG/1
4 TABLET, FILM COATED ORAL ONCE
Refills: 0 | Status: COMPLETED | OUTPATIENT
Start: 2023-09-29 | End: 2023-09-29

## 2023-09-29 RX ORDER — SODIUM CHLORIDE 9 MG/ML
1000 INJECTION INTRAMUSCULAR; INTRAVENOUS; SUBCUTANEOUS ONCE
Refills: 0 | Status: COMPLETED | OUTPATIENT
Start: 2023-09-29 | End: 2023-09-29

## 2023-09-29 RX ADMIN — ONDANSETRON 4 MILLIGRAM(S): 8 TABLET, FILM COATED ORAL at 22:59

## 2023-09-29 RX ADMIN — FAMOTIDINE 20 MILLIGRAM(S): 10 INJECTION INTRAVENOUS at 23:06

## 2023-09-29 RX ADMIN — SODIUM CHLORIDE 1000 MILLILITER(S): 9 INJECTION INTRAMUSCULAR; INTRAVENOUS; SUBCUTANEOUS at 21:56

## 2023-09-29 NOTE — ED PROVIDER NOTE - CLINICAL SUMMARY MEDICAL DECISION MAKING FREE TEXT BOX
Will get Zofran, Pepcid, IV fluids and get imaging for the patient. Will get Zofran, Pepcid, IV fluids and get imaging for the patient.   discussed with patient results   wbc has been high and he is taking abx   pt states he feels much better   pt does not want to get another cat scan he wants to go home   will d/c Will get Zofran, Pepcid, IV fluids and get imaging for the patient.   discussed with patient results   wbc has been high and he is taking abx / last wbc 11 , no fever , last paracentesis 1 week ago negative cultures   pt states he feels much better pt has not received meds yet   pt does not want to get another cat scan he wants to go home   will d/c

## 2023-09-29 NOTE — ED PROVIDER NOTE - PROGRESS NOTE DETAILS
Patient was endorsed by previous physician awaiting CT results.  Anticipate discharge home if lactate improved and patient continues to have no abdominal pain.  Patient tolerating p.o. in ED.  States he is pain-free and wants to go home.  Educated on results care and follow-up.  Educated on signs and symptoms to return sooner to ED.  Answered questions.

## 2023-09-29 NOTE — ED PROVIDER NOTE - OBJECTIVE STATEMENT
58 year old male with a PMHx of liver disease and cirrhosis, and is on Lasix, Cinnamon, Garlic and Spironolactone presents to the ED with complaints of sudden onset abdominal pain. Patient states he did not take his meds this morning but then took meds and a nap. Upon waking up, reports diffused abdominal pain. Denies eating anything but still having pain. Endorses vomiting x1. Denies diarrhea, fever. Reports last paracentesis was 1 week ago.

## 2023-09-29 NOTE — ED PROVIDER NOTE - NSFOLLOWUPINSTRUCTIONS_ED_ALL_ED_FT
take medications as prescribed   if you develop a fever , or abdominal pain returns please return to the ED take medications as prescribed   if you develop a fever , or abdominal pain returns please return to the ED    Cirrhosis of the Liver    WHAT YOU NEED TO KNOW:    Cirrhosis is long-term scarring of the liver. The liver makes enzymes and bile that help digest food and gives your body energy. It also removes harmful material from your body, such as alcohol and other chemicals. Cirrhosis is caused by repeated damage to your liver over time. Scar tissue starts to replace healthy liver tissue. The scar tissue prevents the liver from working properly.  Cirrhosis of the Liver    DISCHARGE INSTRUCTIONS:    Call your local emergency number (911 in the [place]) or have someone call if:    You have a seizure.    You lose consciousness or cannot be woken.    You have sudden shortness of breath or trouble breathing.  Seek care immediately if:    You have pain during a bowel movement and it is black or contains blood.    You have a fast heart rate and fast breathing.    You are dizzy or confused.    You have severe pain in your abdomen.    Your vomit looks like it has coffee grinds or blood in it.  Call your doctor if:    You have a fever.    You have red or itchy skin.    You are in pain and feel weak.    You have questions or concerns about your condition or care.  Medicines: You may need any of the following:    Antiviral medicine may be needed if your cirrhosis is caused by hepatitis. Antiviral medicine may prevent or decrease swelling and damage to your liver.    Blood pressure medicine is used to treat high blood pressure in the portal vein (the vein that goes to your liver).    Diuretics decrease extra fluid that collects in a part of your body, such as your legs and abdomen. Diuretics can also decrease your blood pressure. You will urinate more often when you take this medicine.    Antibiotics help prevent or treat a bacterial infection.    Take your medicine as directed. Contact your healthcare provider if you think your medicine is not helping or if you have side effects. Tell your provider if you are allergic to any medicine. Keep a list of the medicines, vitamins, and herbs you take. Include the amounts, and when and why you take them. Bring the list or the pill bottles to follow-up visits. Carry your medicine list with you in case of an emergency.  Manage or prevent cirrhosis:    Do not drink alcohol. Alcohol will cause more damage to your liver. Ask your healthcare provider for information if you currently drink alcohol and need help to quit.    Do not smoke. Nicotine and other chemicals in cigarettes and cigars can cause blood vessel and lung damage. Ask your provider for information if you currently smoke and need help to quit. E-cigarettes and smokeless tobacco still contain nicotine. Talk to your provider before you use these products.    Eat a variety of healthy foods. Healthy foods include fruits, vegetables, whole-grain breads, low-fat dairy products, beans, lean meat, and fish. Ask if you need to be on a special diet.  Healthy Foods      Reach or maintain a healthy weight. You may develop fatty liver disease if you are overweight. Ask your provider what a healthy weight is for you. Your provider can help you create a safe weight loss plan, if needed.    Limit sodium (salt). You may need to decrease the amount of sodium you eat if you have swelling caused by fluid buildup. Sodium is found in table salt and salty foods such as canned foods, frozen foods, and potato chips.        Drink liquids as directed. Water and other liquids can help your liver work better. Ask how much liquid to drink each day and which liquids are best for you.    Ask about vaccines. You may have a hard time fighting infection because of cirrhosis. Vaccines help protect you against viruses that can cause diseases such as the flu or hepatitis. Viral hepatitis is caused by a virus that leads to inflammation of the liver. You may need a hepatitis A or B vaccine. No vaccine is available for hepatitis C. Your provider can give you information on how to prevent a hepatitis C infection. You may also need a pneumonia vaccine. Always get a flu vaccine as soon as recommended each year, usually in September or October.    Ask about medicines. Some medicines can harm your liver. Acetaminophen is an example. Talk to your provider about all your medicines. Do not take any over-the-counter medicine or herbal supplements until your provider says it is okay.  Follow up with your doctor as directed: Write down your questions so you remember to ask them during your visits

## 2023-09-29 NOTE — ED PROVIDER NOTE - PATIENT PORTAL LINK FT
You can access the FollowMyHealth Patient Portal offered by [facility] by registering at the following website: [email or web address]. By joining [employer]’s FollowMyHealth portal, you will also be able to view your health information using other applications (apps) compatible with our system. You can access the FollowMyHealth Patient Portal offered by [facility] by registering at the following website: [email or web address]. By joining [employer]’s FollowMyHealth portal, you will also be able to view your health information using other applications (apps) compatible with our system.

## 2023-09-30 VITALS
OXYGEN SATURATION: 96 % | TEMPERATURE: 98 F | SYSTOLIC BLOOD PRESSURE: 121 MMHG | HEART RATE: 89 BPM | DIASTOLIC BLOOD PRESSURE: 76 MMHG | RESPIRATION RATE: 18 BRPM

## 2023-09-30 LAB
LACTATE SERPL-SCNC: 1 MMOL/L — SIGNIFICANT CHANGE UP (ref 0.7–2)

## 2023-09-30 PROCEDURE — 96374 THER/PROPH/DIAG INJ IV PUSH: CPT | Mod: XU

## 2023-09-30 PROCEDURE — 83605 ASSAY OF LACTIC ACID: CPT

## 2023-09-30 PROCEDURE — 99285 EMERGENCY DEPT VISIT HI MDM: CPT | Mod: 25

## 2023-09-30 PROCEDURE — G1004: CPT

## 2023-09-30 PROCEDURE — 74177 CT ABD & PELVIS W/CONTRAST: CPT | Mod: ME

## 2023-09-30 PROCEDURE — 85025 COMPLETE CBC W/AUTO DIFF WBC: CPT

## 2023-09-30 PROCEDURE — 83690 ASSAY OF LIPASE: CPT

## 2023-09-30 PROCEDURE — 84484 ASSAY OF TROPONIN QUANT: CPT

## 2023-09-30 PROCEDURE — 80053 COMPREHEN METABOLIC PANEL: CPT

## 2023-09-30 PROCEDURE — 96375 TX/PRO/DX INJ NEW DRUG ADDON: CPT

## 2023-09-30 PROCEDURE — 93005 ELECTROCARDIOGRAM TRACING: CPT

## 2023-09-30 PROCEDURE — 74177 CT ABD & PELVIS W/CONTRAST: CPT | Mod: 26,ME

## 2023-09-30 PROCEDURE — 36415 COLL VENOUS BLD VENIPUNCTURE: CPT

## 2023-09-30 RX ADMIN — SODIUM CHLORIDE 1000 MILLILITER(S): 9 INJECTION INTRAMUSCULAR; INTRAVENOUS; SUBCUTANEOUS at 01:29

## 2023-10-02 PROBLEM — Z00.00 ENCOUNTER FOR PREVENTIVE HEALTH EXAMINATION: Status: ACTIVE | Noted: 2023-10-02

## 2023-10-03 ENCOUNTER — INPATIENT (INPATIENT)
Facility: HOSPITAL | Age: 58
LOS: 6 days | Discharge: HOME | End: 2023-10-10
Attending: STUDENT IN AN ORGANIZED HEALTH CARE EDUCATION/TRAINING PROGRAM | Admitting: STUDENT IN AN ORGANIZED HEALTH CARE EDUCATION/TRAINING PROGRAM
Payer: COMMERCIAL

## 2023-10-03 VITALS
RESPIRATION RATE: 24 BRPM | TEMPERATURE: 98 F | SYSTOLIC BLOOD PRESSURE: 131 MMHG | OXYGEN SATURATION: 92 % | HEART RATE: 90 BPM | DIASTOLIC BLOOD PRESSURE: 96 MMHG

## 2023-10-03 DIAGNOSIS — Z87.898 PERSONAL HISTORY OF OTHER SPECIFIED CONDITIONS: ICD-10-CM

## 2023-10-03 DIAGNOSIS — Z98.890 OTHER SPECIFIED POSTPROCEDURAL STATES: Chronic | ICD-10-CM

## 2023-10-03 DIAGNOSIS — Z29.9 ENCOUNTER FOR PROPHYLACTIC MEASURES, UNSPECIFIED: ICD-10-CM

## 2023-10-03 DIAGNOSIS — K74.60 UNSPECIFIED CIRRHOSIS OF LIVER: ICD-10-CM

## 2023-10-03 DIAGNOSIS — R65.10 SYSTEMIC INFLAMMATORY RESPONSE SYNDROME (SIRS) OF NON-INFECTIOUS ORIGIN WITHOUT ACUTE ORGAN DYSFUNCTION: ICD-10-CM

## 2023-10-03 DIAGNOSIS — R94.31 ABNORMAL ELECTROCARDIOGRAM [ECG] [EKG]: ICD-10-CM

## 2023-10-03 DIAGNOSIS — E87.1 HYPO-OSMOLALITY AND HYPONATREMIA: ICD-10-CM

## 2023-10-03 DIAGNOSIS — A41.9 SEPSIS, UNSPECIFIED ORGANISM: ICD-10-CM

## 2023-10-03 DIAGNOSIS — R18.8 OTHER ASCITES: ICD-10-CM

## 2023-10-03 LAB
ALBUMIN FLD-MCNC: 0.7 G/DL — SIGNIFICANT CHANGE UP
ALBUMIN SERPL ELPH-MCNC: 3 G/DL — LOW (ref 3.3–5)
ALP SERPL-CCNC: 260 U/L — HIGH (ref 40–120)
ALT FLD-CCNC: 41 U/L — SIGNIFICANT CHANGE UP (ref 4–41)
AMMONIA BLD-MCNC: 38 UMOL/L — SIGNIFICANT CHANGE UP (ref 11–55)
ANION GAP SERPL CALC-SCNC: 13 MMOL/L — SIGNIFICANT CHANGE UP (ref 7–14)
APPEARANCE UR: CLEAR — SIGNIFICANT CHANGE UP
AST SERPL-CCNC: 83 U/L — HIGH (ref 4–40)
B PERT IGG+IGM PNL SER: ABNORMAL
BACTERIA # UR AUTO: NEGATIVE /HPF — SIGNIFICANT CHANGE UP
BASE EXCESS BLDV CALC-SCNC: 1 MMOL/L — SIGNIFICANT CHANGE UP (ref -2–3)
BASOPHILS # BLD AUTO: 0.12 K/UL — SIGNIFICANT CHANGE UP (ref 0–0.2)
BASOPHILS NFR BLD AUTO: 0.7 % — SIGNIFICANT CHANGE UP (ref 0–2)
BILIRUB SERPL-MCNC: 5.7 MG/DL — HIGH (ref 0.2–1.2)
BILIRUB UR-MCNC: ABNORMAL
BLD GP AB SCN SERPL QL: NEGATIVE — SIGNIFICANT CHANGE UP
BLOOD GAS VENOUS COMPREHENSIVE RESULT: SIGNIFICANT CHANGE UP
BUN SERPL-MCNC: 16 MG/DL — SIGNIFICANT CHANGE UP (ref 7–23)
CALCIUM SERPL-MCNC: 9.1 MG/DL — SIGNIFICANT CHANGE UP (ref 8.4–10.5)
CAST: 2 /LPF — SIGNIFICANT CHANGE UP (ref 0–4)
CHLORIDE BLDV-SCNC: 95 MMOL/L — LOW (ref 96–108)
CHLORIDE SERPL-SCNC: 95 MMOL/L — LOW (ref 98–107)
CO2 BLDV-SCNC: 24 MMOL/L — SIGNIFICANT CHANGE UP (ref 22–26)
CO2 SERPL-SCNC: 19 MMOL/L — LOW (ref 22–31)
COLOR FLD: ABNORMAL
COLOR SPEC: SIGNIFICANT CHANGE UP
CREAT SERPL-MCNC: 1.05 MG/DL — SIGNIFICANT CHANGE UP (ref 0.5–1.3)
DIFF PNL FLD: NEGATIVE — SIGNIFICANT CHANGE UP
EGFR: 83 ML/MIN/1.73M2 — SIGNIFICANT CHANGE UP
EOSINOPHIL # BLD AUTO: 0.1 K/UL — SIGNIFICANT CHANGE UP (ref 0–0.5)
EOSINOPHIL # FLD: 0 % — SIGNIFICANT CHANGE UP
EOSINOPHIL NFR BLD AUTO: 0.6 % — SIGNIFICANT CHANGE UP (ref 0–6)
FLUID INTAKE SUBSTANCE CLASS: SIGNIFICANT CHANGE UP
FOLATE+VIT B12 SERBLD-IMP: 0 % — SIGNIFICANT CHANGE UP
GAS PNL BLDV: 126 MMOL/L — LOW (ref 136–145)
GLUCOSE BLDV-MCNC: 130 MG/DL — HIGH (ref 70–99)
GLUCOSE FLD-MCNC: 144 MG/DL — SIGNIFICANT CHANGE UP
GLUCOSE SERPL-MCNC: 135 MG/DL — HIGH (ref 70–99)
GLUCOSE UR QL: NEGATIVE MG/DL — SIGNIFICANT CHANGE UP
GRAM STN FLD: SIGNIFICANT CHANGE UP
HCO3 BLDV-SCNC: 23 MMOL/L — SIGNIFICANT CHANGE UP (ref 22–29)
HCT VFR BLD CALC: 35.9 % — LOW (ref 39–50)
HCT VFR BLDA CALC: 39 % — SIGNIFICANT CHANGE UP (ref 39–51)
HGB BLD CALC-MCNC: 13.1 G/DL — SIGNIFICANT CHANGE UP (ref 12.6–17.4)
HGB BLD-MCNC: 13.1 G/DL — SIGNIFICANT CHANGE UP (ref 13–17)
IANC: 13.98 K/UL — HIGH (ref 1.8–7.4)
IMM GRANULOCYTES NFR BLD AUTO: 0.5 % — SIGNIFICANT CHANGE UP (ref 0–0.9)
KETONES UR-MCNC: ABNORMAL MG/DL
LACTATE BLDV-MCNC: 2.3 MMOL/L — HIGH (ref 0.5–2)
LDH SERPL L TO P-CCNC: 64 U/L — SIGNIFICANT CHANGE UP
LEUKOCYTE ESTERASE UR-ACNC: ABNORMAL
LIDOCAIN IGE QN: 36 U/L — SIGNIFICANT CHANGE UP (ref 7–60)
LYMPHOCYTES # BLD AUTO: 1.27 K/UL — SIGNIFICANT CHANGE UP (ref 1–3.3)
LYMPHOCYTES # BLD AUTO: 7.6 % — LOW (ref 13–44)
LYMPHOCYTES # FLD: 20 % — SIGNIFICANT CHANGE UP
MCHC RBC-ENTMCNC: 34.7 PG — HIGH (ref 27–34)
MCHC RBC-ENTMCNC: 36.5 GM/DL — HIGH (ref 32–36)
MCV RBC AUTO: 95 FL — SIGNIFICANT CHANGE UP (ref 80–100)
MESOTHL CELL # FLD: 2 % — SIGNIFICANT CHANGE UP
MONOCYTES # BLD AUTO: 1.06 K/UL — HIGH (ref 0–0.9)
MONOCYTES NFR BLD AUTO: 6.4 % — SIGNIFICANT CHANGE UP (ref 2–14)
MONOS+MACROS # FLD: 60 % — SIGNIFICANT CHANGE UP
NEUTROPHILS # BLD AUTO: 13.98 K/UL — HIGH (ref 1.8–7.4)
NEUTROPHILS NFR BLD AUTO: 84.2 % — HIGH (ref 43–77)
NEUTROPHILS-BODY FLUID: 18 % — SIGNIFICANT CHANGE UP
NITRITE UR-MCNC: NEGATIVE — SIGNIFICANT CHANGE UP
NRBC # BLD: 0 /100 WBCS — SIGNIFICANT CHANGE UP (ref 0–0)
NRBC # FLD: 0 K/UL — SIGNIFICANT CHANGE UP (ref 0–0)
OTHER CELLS FLD MANUAL: 0 % — SIGNIFICANT CHANGE UP
PCO2 BLDV: 29 MMHG — LOW (ref 42–55)
PH BLDV: 7.51 — HIGH (ref 7.32–7.43)
PH UR: 7 — SIGNIFICANT CHANGE UP (ref 5–8)
PLATELET # BLD AUTO: 97 K/UL — LOW (ref 150–400)
PO2 BLDV: 34 MMHG — SIGNIFICANT CHANGE UP (ref 25–45)
POTASSIUM BLDV-SCNC: 4.4 MMOL/L — SIGNIFICANT CHANGE UP (ref 3.5–5.1)
POTASSIUM SERPL-MCNC: 4.7 MMOL/L — SIGNIFICANT CHANGE UP (ref 3.5–5.3)
POTASSIUM SERPL-SCNC: 4.7 MMOL/L — SIGNIFICANT CHANGE UP (ref 3.5–5.3)
PROT FLD-MCNC: 1.7 G/DL — SIGNIFICANT CHANGE UP
PROT SERPL-MCNC: 6.7 G/DL — SIGNIFICANT CHANGE UP (ref 6–8.3)
PROT UR-MCNC: SIGNIFICANT CHANGE UP MG/DL
RBC # BLD: 3.78 M/UL — LOW (ref 4.2–5.8)
RBC # FLD: 14.6 % — HIGH (ref 10.3–14.5)
RBC CASTS # UR COMP ASSIST: 2 /HPF — SIGNIFICANT CHANGE UP (ref 0–4)
RCV VOL RI: 8000 CELLS/UL — HIGH (ref 0–5)
REVIEW: SIGNIFICANT CHANGE UP
RH IG SCN BLD-IMP: POSITIVE — SIGNIFICANT CHANGE UP
SAO2 % BLDV: 49.7 % — LOW (ref 67–88)
SODIUM SERPL-SCNC: 127 MMOL/L — LOW (ref 135–145)
SP GR SPEC: 1.05 — HIGH (ref 1–1.03)
SPECIMEN SOURCE: SIGNIFICANT CHANGE UP
SQUAMOUS # UR AUTO: 0 /HPF — SIGNIFICANT CHANGE UP (ref 0–5)
TOTAL CELLS COUNTED, BODY FLUID: 100 CELLS — SIGNIFICANT CHANGE UP
TOTAL NUCLEATED CELL COUNT, BODY FLUID: 283 CELLS/UL — HIGH (ref 0–5)
TUBE TYPE: SIGNIFICANT CHANGE UP
UROBILINOGEN FLD QL: 1 MG/DL — SIGNIFICANT CHANGE UP (ref 0.2–1)
WBC # BLD: 16.61 K/UL — HIGH (ref 3.8–10.5)
WBC # FLD AUTO: 16.61 K/UL — HIGH (ref 3.8–10.5)
WBC UR QL: 0 /HPF — SIGNIFICANT CHANGE UP (ref 0–5)

## 2023-10-03 PROCEDURE — 99222 1ST HOSP IP/OBS MODERATE 55: CPT | Mod: GC

## 2023-10-03 PROCEDURE — 99222 1ST HOSP IP/OBS MODERATE 55: CPT

## 2023-10-03 PROCEDURE — 49082 ABD PARACENTESIS: CPT

## 2023-10-03 PROCEDURE — 71045 X-RAY EXAM CHEST 1 VIEW: CPT | Mod: 26

## 2023-10-03 PROCEDURE — 99223 1ST HOSP IP/OBS HIGH 75: CPT | Mod: GC

## 2023-10-03 PROCEDURE — 99285 EMERGENCY DEPT VISIT HI MDM: CPT | Mod: 25

## 2023-10-03 PROCEDURE — 74177 CT ABD & PELVIS W/CONTRAST: CPT | Mod: 26,MA

## 2023-10-03 RX ORDER — FUROSEMIDE 40 MG
40 TABLET ORAL ONCE
Refills: 0 | Status: COMPLETED | OUTPATIENT
Start: 2023-10-03 | End: 2023-10-03

## 2023-10-03 RX ORDER — SPIRONOLACTONE 25 MG/1
100 TABLET, FILM COATED ORAL
Refills: 0 | Status: DISCONTINUED | OUTPATIENT
Start: 2023-10-03 | End: 2023-10-03

## 2023-10-03 RX ORDER — SPIRONOLACTONE 25 MG/1
100 TABLET, FILM COATED ORAL DAILY
Refills: 0 | Status: DISCONTINUED | OUTPATIENT
Start: 2023-10-03 | End: 2023-10-07

## 2023-10-03 RX ORDER — ONDANSETRON 8 MG/1
4 TABLET, FILM COATED ORAL ONCE
Refills: 0 | Status: COMPLETED | OUTPATIENT
Start: 2023-10-03 | End: 2023-10-03

## 2023-10-03 RX ORDER — LACTULOSE 10 G/15ML
15 SOLUTION ORAL
Refills: 0 | Status: DISCONTINUED | OUTPATIENT
Start: 2023-10-03 | End: 2023-10-10

## 2023-10-03 RX ORDER — CEFTRIAXONE 500 MG/1
2000 INJECTION, POWDER, FOR SOLUTION INTRAMUSCULAR; INTRAVENOUS ONCE
Refills: 0 | Status: COMPLETED | OUTPATIENT
Start: 2023-10-03 | End: 2023-10-03

## 2023-10-03 RX ORDER — FUROSEMIDE 40 MG
40 TABLET ORAL EVERY 12 HOURS
Refills: 0 | Status: DISCONTINUED | OUTPATIENT
Start: 2023-10-03 | End: 2023-10-03

## 2023-10-03 RX ORDER — PIPERACILLIN AND TAZOBACTAM 4; .5 G/20ML; G/20ML
3.38 INJECTION, POWDER, LYOPHILIZED, FOR SOLUTION INTRAVENOUS ONCE
Refills: 0 | Status: COMPLETED | OUTPATIENT
Start: 2023-10-03 | End: 2023-10-03

## 2023-10-03 RX ORDER — MORPHINE SULFATE 50 MG/1
4 CAPSULE, EXTENDED RELEASE ORAL ONCE
Refills: 0 | Status: DISCONTINUED | OUTPATIENT
Start: 2023-10-03 | End: 2023-10-03

## 2023-10-03 RX ORDER — PIPERACILLIN AND TAZOBACTAM 4; .5 G/20ML; G/20ML
3.38 INJECTION, POWDER, LYOPHILIZED, FOR SOLUTION INTRAVENOUS ONCE
Refills: 0 | Status: DISCONTINUED | OUTPATIENT
Start: 2023-10-03 | End: 2023-10-03

## 2023-10-03 RX ORDER — SODIUM CHLORIDE 9 MG/ML
1000 INJECTION INTRAMUSCULAR; INTRAVENOUS; SUBCUTANEOUS ONCE
Refills: 0 | Status: DISCONTINUED | OUTPATIENT
Start: 2023-10-03 | End: 2023-10-03

## 2023-10-03 RX ORDER — HEPARIN SODIUM 5000 [USP'U]/ML
5000 INJECTION INTRAVENOUS; SUBCUTANEOUS EVERY 12 HOURS
Refills: 0 | Status: COMPLETED | OUTPATIENT
Start: 2023-10-03 | End: 2023-10-03

## 2023-10-03 RX ADMIN — MORPHINE SULFATE 4 MILLIGRAM(S): 50 CAPSULE, EXTENDED RELEASE ORAL at 07:46

## 2023-10-03 RX ADMIN — CEFTRIAXONE 100 MILLIGRAM(S): 500 INJECTION, POWDER, FOR SOLUTION INTRAMUSCULAR; INTRAVENOUS at 08:29

## 2023-10-03 RX ADMIN — ONDANSETRON 4 MILLIGRAM(S): 8 TABLET, FILM COATED ORAL at 07:45

## 2023-10-03 RX ADMIN — PIPERACILLIN AND TAZOBACTAM 200 GRAM(S): 4; .5 INJECTION, POWDER, LYOPHILIZED, FOR SOLUTION INTRAVENOUS at 11:35

## 2023-10-03 RX ADMIN — LACTULOSE 15 GRAM(S): 10 SOLUTION ORAL at 18:01

## 2023-10-03 RX ADMIN — SPIRONOLACTONE 100 MILLIGRAM(S): 25 TABLET, FILM COATED ORAL at 18:01

## 2023-10-03 RX ADMIN — Medication 40 MILLIGRAM(S): at 18:01

## 2023-10-03 NOTE — H&P ADULT - NSHPSOCIALHISTORY_GEN_ALL_CORE
no patient ambulates on his own and cares for himself. He lives with his daughter but they are currently renovating the house, so she lives down the street and visits frequently, she will move back in once the renovations are complete. He works as a  and enjoyed playing football and riding his motorcycle prior to knee surgery

## 2023-10-03 NOTE — H&P ADULT - NSHPPHYSICALEXAM_GEN_ALL_CORE
LOS:     VITALS:   T(C): 36.8 (10-03-23 @ 13:45), Max: 37.1 (10-03-23 @ 12:14)  HR: 83 (10-03-23 @ 13:45) (80 - 90)  BP: 125/61 (10-03-23 @ 13:45) (102/59 - 131/96)  RR: 19 (10-03-23 @ 13:45) (17 - 24)  SpO2: 99% (10-03-23 @ 13:45) (92% - 100%)    GENERAL: NAD, resting comfortably,   HEAD: Without tenderness, visible or palpable masses, depressions, or scarring.   EYES: EOMI, PERRLA, conjunctiva and sclera w/ mild scleral icterus   ENT: Moist mucous membranes  NECK: Supple, No elevated JVP.  CHEST/LUNG: Clear to auscultation bilaterally; No rales, rhonchi, or wheezes.   HEART: Regular rate and rhythm; No M/R/G  ABDOMEN: Bowel sounds present; + fluid wave, large in diameter, soft, with significant and reducible umbilical hernia,   EXTREMITIES:  2+ Peripheral Pulses, brisk capillary refill. No clubbing, cyanosis, or edema  NERVOUS SYSTEM:  A&Ox3, no focal deficits   SKIN: No rashes or lesions

## 2023-10-03 NOTE — ED PROVIDER NOTE - CLINICAL SUMMARY MEDICAL DECISION MAKING FREE TEXT BOX
58-year-old male with history of liver cirrhosis secondary to alcohol use disorder p/w abdominal pain and sob that began last night. similar sx when he presented for paracentesis in past. No f/c, emesis. req NC 4l. Will get labs, ct abd/pelvuis, diagnostic para, reassess. TBA.

## 2023-10-03 NOTE — H&P ADULT - PROBLEM SELECTOR PLAN 2
history of alcoholic cirrhosis w/ multiple therapeutic paracentesis    -ALT/AST elevation   -Trend CBC/BMP/Coag  - MELD score daily (25 based on previous coag)   - Saag 2.3 meets SIRS criteria for tachypnea and elevated WBC upon admission. GI consulted for CBD dilation.  -s/p paracentesis, CT/AP and 2g ceftriaxone and 3.375g zosyn   -will monitor for s/sx of infection   -trend wbc and t. bili meets SIRS criteria for tachypnea RR>20 and elevated WBC upon admission. GI consulted for CBD dilation.  Obtain MRCP  -s/p paracentesis, CT/AP and 2g ceftriaxone and 3.375g zosyn   -will monitor for s/sx of infection   -trend wbc and t. bili

## 2023-10-03 NOTE — ED PROCEDURE NOTE - PROCEDURE NAME, MLM
Point of Care Ultrasound Other
Point of Care Ultrasound RUQ
Point of Care Ultrasound Renal
Paracentesis

## 2023-10-03 NOTE — H&P ADULT - PROBLEM SELECTOR PLAN 1
meets SIRS criteria for tachypnea and elevated WBC upon admission. GI consulted for CBD dilation.  - likely 2/2 infection of CBD   -s/p paracentesis, CT/AP and 2g ceftriaxone in ED   -continue zosyn (10/3-)   -will monitor for s/sx of cholangitis history of alcoholic cirrhosis w/ multiple therapeutic paracentesis w/ confirmed portal hypertension observed on CT  -ALT/AST elevation   -Trend CBC/BMP/Coag  - MELD score daily (25 based on previous coag)   - Saag 2.3  - low threshold to start symptom-triggered CIWA w/ ativan history of alcoholic cirrhosis w/ multiple therapeutic paracentesis w/ confirmed portal hypertension observed on CT  - GI consulted appreciate recs   -ALT/AST elevation   -Trend CBC/BMP/Coag  - MELD score daily (25 based on previous coag)   - Saag 2.3  - low threshold to start symptom-triggered CIWA w/ ativan

## 2023-10-03 NOTE — CONSULT NOTE ADULT - SUBJECTIVE AND OBJECTIVE BOX
HPI:  ANILA RAMOS is a 58 year old male with history of AUD, remote DVT [not on AC], and decompensated alcohol-associated cirrhosis c/b ascites and alcohol-associated hepatitis [s/p prednisone July-August 2023 with subsequent taper] who presents with abdominal pain.    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  Follows with hepatologist Dr. Simental and just completed prednisone taper last week.  On 9/29/2023, he developed acute, intermittent, sharp RUQ pain, worsened with meals.  He went to [facility] ED that night, his symptoms improved and was sent home, however only came back to [facility] due to worsening abdominal pain.  Otherwise, patient denies fevers, chills, weight loss, dysphagia, odynophagia, nausea, vomiting, diarrhea, melena, hematemesis, hematochezia, change in stool caliber, or family history of GI-related cancers.    ROS:   General:  No fevers, chills, night sweats, fatigue  Eyes:  Good vision, no reported pain  ENT:  No sore throat, pain, runny nose  CV:  No pain, palpitations  Pulm:  No dyspnea, cough  GI:  See HPI, otherwise negative  :  No  incontinence, nocturia  Muscle:  No pain, weakness  Neuro:  No memory problems  Psych:  No insomnia, mood problems, depression  Endocrine:  No polyuria, polydipsia, cold/heat intolerance  Heme:  No petechiae, ecchymosis, easy bruisability  Skin:  No rash    PMHX/PSHX:    ETOH abuse    S/P left knee surgery      Allergies:  cortisone (Swelling; Rash)      Home Medications: reviewed  Hospital Medications:  furosemide    Tablet 40 milliGRAM(s) Oral every 12 hours  lactulose Syrup 15 Gram(s) Oral two times a day  LORazepam     Tablet 2 milliGRAM(s) Oral every 2 hours PRN  LORazepam   Injectable 2 milliGRAM(s) IV Push every 1 hour PRN  piperacillin/tazobactam IVPB. 3.375 Gram(s) IV Intermittent once  piperacillin/tazobactam IVPB.- 3.375 Gram(s) IV Intermittent once  spironolactone 100 milliGRAM(s) Oral two times a day      Social History:   Tobacco: denies  Alcohol: denies  Recreational drugs: denies    Family history:    FHx: myocardial infarction (Father)      Denies family history of colon cancer/polyps, stomach cancer/polyps, pancreatic cancer/masses, liver cancer/disease, ovarian cancer and endometrial cancer.    PHYSICAL EXAM:   Vital Signs:  Vital Signs Last 24 Hrs  T(C): 36.8 (03 Oct 2023 13:45), Max: 37.1 (03 Oct 2023 12:14)  T(F): 98.2 (03 Oct 2023 13:45), Max: 98.7 (03 Oct 2023 12:14)  HR: 83 (03 Oct 2023 13:45) (80 - 90)  BP: 125/61 (03 Oct 2023 13:45) (102/59 - 131/96)  BP(mean): --  RR: 19 (03 Oct 2023 13:45) (17 - 24)  SpO2: 99% (03 Oct 2023 13:45) (92% - 100%)    Parameters below as of 03 Oct 2023 13:45  Patient On (Oxygen Delivery Method): room air      Daily Height in cm: 172.72 (03 Oct 2023 13:45)    Daily     GENERAL: no acute distress  NEURO: alert  HEENT: NCAT, no conjunctival pallor appreciated  CHEST: no respiratory distress, no accessory muscle use  CARDIAC: regular rate, +S1/S2  ABDOMEN: soft, mildly distended, no rebound or guarding  EXTREMITIES: warm, well perfused  SKIN: no lesions noted    LABS: reviewed                        13.1   16.61 )-----------( 97       ( 03 Oct 2023 06:47 )             35.9     10-03    127<L>  |  95<L>  |  16  ----------------------------<  135<H>  4.7   |  19<L>  |  1.05    Ca    9.1      03 Oct 2023 06:47    TPro  6.7  /  Alb  3.0<L>  /  TBili  5.7<H>  /  DBili  x   /  AST  83<H>  /  ALT  41  /  AlkPhos  260<H>  10-03    LIVER FUNCTIONS - ( 03 Oct 2023 06:47 )  Alb: 3.0 g/dL / Pro: 6.7 g/dL / ALK PHOS: 260 U/L / ALT: 41 U/L / AST: 83 U/L / GGT: x               Diagnostic Studies: see sunrise for full report

## 2023-10-03 NOTE — ED PROVIDER NOTE - ATTENDING CONTRIBUTION TO CARE
I have personally performed a face to face medical and diagnostic evaluation of the patient. I have discussed with and reviewed the Resident's note and agree with the History, ROS, Physical Exam and MDM unless otherwise indicated. A brief summary of my personal evaluation and impression can be found below.    Rhina CARTER: 58M hx of etoh cirrhosis presents with a cc of diffuse upper abdominal pain pt reports was seen at [facility] for similar had labs and imaging done in ED and was sent home. No fever, notes some CP and SOB similar to prior episode requiring para in the past. No urinary or bowel complaints or changes. No vomiting, but having nausea.     All other ROS negative, except as above and as per HPI and ROS section.    VITALS: Initial triage and subsequent vitals have been reviewed by me.  GEN APPEARANCE: Alert, non-toxic, chronically ill   HEAD: Atraumatic.  EYES: PERRLa, EOMI, vision grossly intact.   NECK: Supple  CV: RRR, S1S2, no c/r/m/g. Cap refill <2 seconds. No bruits.   LUNGS: CTAB. No abnormal breath sounds.  ABDOMEN:  diffuse abdominal tenderness    MSK/EXT: No spinal or extremity point tenderness. No CVA ttp. Pelvis stable. No peripheral edema.  NEURO: Alert, follows commands. Speech normal. Sensation and motor normal x4 extremities.   SKIN: Warm, dry and intact. No rash.  PSYCH: Appropriate    Plan/MDM: exam vss non toxic PE as above ddx c/f worsening abdominal pain consider possible SBP vs worsening abdominal ascites, consider surgical or obstructive pathology will check labs ekg caradaics cxr ctap dx para meds as nededed, reassess, anticipate admission.

## 2023-10-03 NOTE — H&P ADULT - SOCIAL HISTORY: ALCOHOL USE
patient reports 3-5 "double glass" of nayana whiskey after work for the past  25 years. Per patient he stopped drinking at the end of June 2023. patient reports 3-5 "double glass" of [place] whiskey after work for the past  25 years. Per patient he stopped drinking at the end of June 2023.

## 2023-10-03 NOTE — H&P ADULT - NSHPLABSRESULTS_GEN_ALL_CORE
.  LABS:                         13.1   16.61 )-----------( 97       ( 03 Oct 2023 06:47 )             35.9     10    127<L>  |  95<L>  |  16  ----------------------------<  135<H>  4.7   |  19<L>  |  1.05    Ca    9.1      03 Oct 2023 06:47    TPro  6.7  /  Alb  3.0<L>  /  TBili  5.7<H>  /  DBili  x   /  AST  83<H>  /  ALT  41  /  AlkPhos  260<H>  10-      Urinalysis Basic - ( 03 Oct 2023 12:23 )    Color: Dark Yellow / Appearance: Clear / S.047 / pH: x  Gluc: x / Ketone: Trace mg/dL  / Bili: Moderate / Urobili: 1.0 mg/dL   Blood: x / Protein: Trace mg/dL / Nitrite: Negative   Leuk Esterase: Trace / RBC: 2 /HPF / WBC 0 /HPF   Sq Epi: x / Non Sq Epi: 0 /HPF / Bacteria: Negative /HPF            RADIOLOGY, EKG & ADDITIONAL TESTS: Reviewed.

## 2023-10-03 NOTE — H&P ADULT - PROBLEM SELECTOR PLAN 6
Activity: Bed rest  Bowel reg: lactulose  Consultants: GI  DVT ppx: SCDs  Diet: regular with fluid restriction   Dispo: Pending optimization  Directive: Full code  Electrolytes:  Fluids: Consider albumin for HoTN  Hardware:

## 2023-10-03 NOTE — ED PROCEDURE NOTE - ATTENDING CONTRIBUTION TO CARE
Directly supervised procedure with no complications. Pt tolerated well.
I have personally discussed the indications, risks and benefits of the above procedure with the resident and/or ACP. I was present for key portions of the procedure and assisted when required.

## 2023-10-03 NOTE — H&P ADULT - SOCIAL HISTORY: SUBSTANCE USE
Patient endorses hx of drug use including LSD and cocaine in the 70's but no longer uses recreational drugs.

## 2023-10-03 NOTE — H&P ADULT - PROBLEM SELECTOR PLAN 4
PT w/ QTC  on admission of 504  - avoid QT prolonging medications Activity: Bed rest  Bowel reg: lactulose  Consultants: GI  DVT ppx: SCDs  Diet: regular with fluid restriction   Dispo: Pending optimization  Directive: Full code  Electrolytes:  Fluids: Consider albumin for HoTN  Hardware: Activity: Bed rest  Bowel reg: lactulose  Consultants: GI  DVT ppx: heparin subq 5000 bid   Diet: regular with fluid restriction   Dispo: Pending optimization  Directive: Full code  Electrolytes:  Fluids: Consider albumin for HoTN

## 2023-10-03 NOTE — ED PROCEDURE NOTE - PROCEDURE ADDITIONAL DETAILS
Emergency Department Focused Ultrasound performed at patient's bedside for educational purposes. The study will have a follow up study performed or was performed in the direct supervision of an ultrasound trained attending.

## 2023-10-03 NOTE — CONSULT NOTE ADULT - ATTENDING COMMENTS
58M with ETOH Cirhrosis recent ETOH hepatitis presenting with new onset worsening jaundice -  with new CBD dilation and elevated WBC concerning for possible biliary obstruction    Agree with empiric Abx  Would d/w biliary team for possible ERCP vs MRI First  MELD 25  There was consideration for OLT evaluation in near future - will see how patient does clinically - if worsening MELD - would transfer to [facility] to initiate OLT eval
Patient seen and examined with the GI fellow. I agree with the above assessment and plan. Thank you for allowing us to care for your patient.    Pls obtain an MRCP to r/o a CBD stone.

## 2023-10-03 NOTE — H&P ADULT - PROBLEM/PLAN-5
Care Manager called and left a message for mom    Follow up #1  Items to follow up on:  · MRI scheduling  · PT scheduling at agency closer to home.      Will follow up again in 1 week  Also sent Portal Message to Mom.      
DISPLAY PLAN FREE TEXT

## 2023-10-03 NOTE — ED ADULT NURSE NOTE - NSFALLHARMRISKINTERV_ED_ALL_ED
Assistance OOB with selected safe patient handling equipment if applicable/Communicate risk of Fall with Harm to all staff, patient, and family/Provide visual cue: red socks, yellow wristband, yellow gown, etc/Reinforce activity limits and safety measures with patient and family/Bed in lowest position, wheels locked, appropriate side rails in place/Call bell, personal items and telephone in reach/Instruct patient to call for assistance before getting out of bed/chair/stretcher/Non-slip footwear applied when patient is off stretcher/[place] to call system/Physically safe environment - no spills, clutter or unnecessary equipment/Purposeful Proactive Rounding/Room/bathroom lighting operational, light cord in reach

## 2023-10-03 NOTE — CONSULT NOTE ADULT - ASSESSMENT
Patient is a 59 yo M PMHx EtOH abuse (3-5 double Turk[place] whiskey 5 days a week x25 years) decompensated cirrhosis requiring numerous therapeutic paracentesis (last 9/26), prior DVT, prior positive C.diff presenting with 10/10 abdominal pain. Patient found to have Tbili elevated from baseline, as well as leukocytosis. CT A/P w/ CBD dilation.    #C/f biliary obstruction vs cholangitis  - Tbili elevated in 5s (3s at time of discharge). WBC elevated although has been on prednisone taper for alc hep until recently.  - CT A/P IVC - distended gallbladder with new dilatation of the CBD.   #Cirrhosis 2/2 AUD. Moderate ascites. No recent EGD.    Recommendations  - broad spectrum abx  - f/u Bcx x2  - please obtain MRCP  - trend CBC, CMP, coags    GI will continue to follow.     All recommendations are tentative until note is attested by attending.     Piper Castañeda, PGY6    Gastroenterology/Hepatology Fellow  Available on Microsoft Teams  [phone] ([employer] Short Range Pager)  [phone] (Long Range Pager)    After 5pm, please contact the on-call GI fellow. [phone] Patient is a 59 yo M PMHx EtOH abuse (3-5 double Turk[place] whiskey 5 days a week x25 years) decompensated cirrhosis requiring numerous therapeutic paracentesis (last 9/26), prior DVT, prior positive C.diff presenting with 10/10 abdominal pain. Patient found to have Tbili elevated from baseline, as well as leukocytosis. CT A/P w/ CBD dilation.    #C/f biliary obstruction vs cholangitis  - Tbili elevated in 5s (3s at time of discharge). WBC elevated although has been on prednisone taper for alc hep until recently.  - CT A/P IVC - distended gallbladder with new dilatation of the CBD.   #Cirrhosis 2/2 AUD. Moderate ascites. No recent EGD.  - hypoNa to 127    Recommendations  - broad spectrum abx  - f/u Bcx x2  - please obtain MRCP  - trend CBC, CMP, coags  - please monitor for decompensation, hemodynamic instability in which case may require endoscopic intervention more urgently    GI will continue to follow.     All recommendations are tentative until note is attested by attending.     Piper Castañeda, PGY6    Gastroenterology/Hepatology Fellow  Available on Microsoft Teams  [phone] ([employer] Short Range Pager)  [phone] (Long Range Pager)    After 5pm, please contact the on-call GI fellow. [phone]

## 2023-10-03 NOTE — ED PROVIDER NOTE - PROGRESS NOTE DETAILS
Talia Springer MD (PGY3): Diagnostic paradigm, cells to 83 but lots of red blood cells.  Ceftriaxone given empirically.  CT with ascites and cirrhosis however with dilated cbd. ?cholangitis but afebrile. was on prednisone per hepatology notes. finished oct 2. GI evaluated by GI, plan for advanced hep consult and ercp. endorsed to dr. blood. pt now on 2lnc, comfortable.

## 2023-10-03 NOTE — H&P ADULT - ASSESSMENT
58M pmhx EtOH abuse, decompensated cirrhosis requiring numerous therapeutic paracentesis (last 9/26), prior DVT, prior positive C.diff presenting with 10/10 abdominal pain since last night. Therapeutic paracentesis providing symptom relief on day of admission. CT scan showing CBD dilatation further workup needed  with concern for cholangitis  58M pmhx EtOH abuse, decompensated cirrhosis requiring numerous therapeutic paracentesis (last 9/26), prior DVT, prior positive C.diff presenting with 10/10 abdominal pain since last night. S/p diagnostic paracentesis w/out evidence of SBP.  58M pmhx EtOH abuse, decompensated cirrhosis requiring numerous therapeutic paracentesis (last 9/26), prior remote hx of DVT not on AC, prior positive C.diff presenting with 10/10 abdominal pain x1 day. Found to have moderate ascites s/p diagnostic paracentesis w/out evidence of SBP and dilated CBD.

## 2023-10-03 NOTE — ED ADULT NURSE REASSESSMENT NOTE - NS ED NURSE REASSESS COMMENT FT1
patient received from night HEVER Sanchez at 0810 at baseline mental status, appears to be resting in bed comfortably, no complaints noted at this time. Breathing even and unlabored, pallor/diaphoresis not noted. Vital signs as charted. IV site clean dry and intact.
patient at baseline mental status, appears to be resting comfortably in bed, no complaints noted at this time. Breathing even and unlabored, pallor/diaphoresis not noted. IV site clean dry and intact. pending admission disposition at this time

## 2023-10-03 NOTE — H&P ADULT - HISTORY OF PRESENT ILLNESS
58M pmhx EtOH abuse (3-5 double nayana whiskey 5 days a week x25 years) decompensated cirrhosis requiring numerous therapeutic paracentesis (last 9/26), prior DVT, prior positive C.diff presenting with 10/10 abdominal pain since last night. States it is similar to abdominal pain that he has had in the pain that improves with paracentesis. Patient describes pain as tight and constant. Does not report exacerbating factors and reports that he pain of this nature always decreases after paracentesis. Patient also endorses chills and cold sweats that occur along with the pain which are also relieved after paracentesis.   No prior alcohol withdrawal noted. Including no ICU, DTs, or intubations.     Was at [facility] on 9/29 with abdominal pain. Found to have WBC 20, lactate 3.9 (downtrended to 1) and CT w/ evidence of portal hypertension, moderate ascites, and portal enterocolopathy.     Today in ED patient received diagnostic paracentesis showing cells to 83, but significant red blood. Patient s/p 2000mg ceftriaxone. CT scan showing Cirrhosis and portal hypertension. Moderate ascites. Distended gallbladder with new dilatation of the CBD.     58M pmhx EtOH abuse (3-5 double nayana whiskey 5 days a week x25 years) decompensated cirrhosis requiring numerous therapeutic paracentesis (last 9/26), prior DVT, prior positive C.diff and recent admission for alcoholic hepatitis in July with steroid taper complete 9/28 presenting with 10/10 abdominal pain since last night. States it is similar to abdominal pain that he has had in the pain that improves with paracentesis. Patient describes pain as tight and constant. Does not report exacerbating factors and reports that he pain of this nature always decreases after paracentesis. Patient also endorses chills and cold sweats that occur along with the pain which are also relieved after paracentesis.   No prior alcohol withdrawal noted. Including no ICU, DTs, or intubations.     Was at [facility] on 9/29 with abdominal pain. Found to have WBC 20, lactate 3.9 (downtrended to 1) and CT w/ evidence of portal hypertension, moderate ascites, and portal enterocolopathy.     Today in ED patient received diagnostic paracentesis showing cells to 83, but significant red blood. Patient s/p 2000mg ceftriaxone. CT scan showing Cirrhosis and portal hypertension. Moderate ascites. Distended gallbladder with new dilatation of the CBD.

## 2023-10-03 NOTE — H&P ADULT - PROBLEM SELECTOR PLAN 5
Activity: Bed rest  Bowel reg: lactulose  Consultants: GI  DVT ppx: SCDs  Diet: NPO  Dispo: Pending optimization  Directive: Full code  Electrolytes:  Fluids: Consider albumin for HoTN  Hardware: patient with sodium of 127 on admission ISO   - furosemide 40 bid   - urine osm/urine sodium  - fluid restrict

## 2023-10-03 NOTE — PATIENT PROFILE ADULT - FALL HARM RISK - HARM RISK INTERVENTIONS

## 2023-10-03 NOTE — ED PROVIDER NOTE - PHYSICAL EXAMINATION
General: Alert and Orientated x 3.   On nasal cannula  Head: Normocephalic and atraumatic.  Eyes: PERRLA with EOMI.  Neck: Supple. Trachea midline.   Cardiac: Normal S1 and S2 w/ RRR. No murmurs appreciated. No JVD appreciated.  Pulmonary: CTA bilaterally. No increased WOB. No wheezes or crackles.  Abdominal:   Distended with fluid wave.  There is mild diffuse abdominal tenderness palpation in all quadrants.   There is a  reducible umbilical hernia.. (+) bowel sounds appreciated in all 4 quadrants. No hepatosplenomegaly.   Neurologic: No focal sensory or motor deficits.  Musculoskeletal: Strength appropriate in all 4 extremities for age with no limited ROM.  Skin: Color appropriate for race. Intact, warm, and well-perfused.  Psychiatric: Appropriate mood and affect. No apparent risk to self or others.

## 2023-10-03 NOTE — H&P ADULT - ATTENDING COMMENTS
58 yr old man PMHx EtOH abuse, decompensated cirrhosis c/b ascites and alc hep s/p course of prednisone (July-September), prior DVT, prior positive C.diff p/w diffuse abdominal pain. Found to have distended GBCBD dilatation to 1.2cm and moderate ascites s/p diagnostic paracentesis.    Abd soft, nontender, distended with + umbilical hernia that is reducible, + fluid wave  AAOX4, mild tremors and tongue fasciculations      Ascitic fluid negative for SBP, ascitic cx NGTD, f/u Bcx  Will monitor off Abx, recent completion of steroids potentially contributing to leukocytosis. Low threshold to start Abx if febrile or decompensating.   Tbili elevated above baseline but afebrile, clinically nontoxic appearing.   Obtain MRCP to further evaluate CBD dilation  Continue lasix, aldactone (recently reduced as outpatient), fluid restrict 1L, trend Na for now  Obtain therapeutic paracentesis  Undergoing outpatient screening for liver transplant. Plan for EGD/Colon as outpatient on 10/19.   GI and hepatology following

## 2023-10-03 NOTE — CONSULT NOTE ADULT - ASSESSMENT
58 year old male with history of AUD, remote DVT [not on AC], and decompensated alcohol-associated cirrhosis c/b ascites and alcohol-associated hepatitis [s/p prednisone July-August 2023 with subsequent taper] who presents with abdominal pain.  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  Follows with hepatologist Dr. Simental and just completed prednisone taper last week.  On 9/29/2023, he developed acute, intermittent, sharp RUQ pain, worsened with meals.  He went to [facility] that night, his symptoms improved and was sent home, however only came back to [facility] due to worsening abdominal pain.  Otherwise, patient denies fevers, chills, weight loss, dysphagia, odynophagia, nausea, vomiting, diarrhea, melena, hematemesis, hematochezia, change in stool caliber, or family history of GI-related cancers.    # Acute, sharp, intermittent RUQ pain  # Leukocytosis  # Hyperbilirubinemia above baseline  # Newly dilated CBD to 12mm  # History of AUD  # Decompensated alcohol-associated cirrhosis c/b ascites and alcohol-associated hepatitis [s/p prednisone July-August 2023 with subsequent taper]        -MELD-Na = 25 on 10/3/2023       -Ascites: on Lasix 40mg BID and Aldactone 100mg BID at home       -SBP: negative; most recent paracentesis 10/3/2023       -Varices: unknown       -Hepatic encephalopathy: Ammonia, Serum: 38 umol/L [11 - 55] (10-03-23 @ 08:06)  lactulose Syrup 15 Gram(s) Oral two times a day, 10-03-23 @ 14:36, Routine       -HCC: none on CT imaging    Recommendations:  -trend clinical symptoms, exam findings, vital signs, CBC, CMP, INR  -complete infectious workup and f/u BCx  -IVF and empiric antibiotics following culture collection  -per [place] criteria, patient does not meet criteria for emergent ERCP  -recommend MRI/MRCP given clinical scenario and imaging findings  -may require ERCP for cholangitis pending MRI findings and optimization of hyponatremia  -no indication for steroids as he completed his course of prednisone with taper  -will readdress for pathway to transplant after workup for cholangitis/choledocholithiasis    Note incomplete until finalized by attending signature/attestation.    Curtis Almeida  GI/Hepatology Fellow    MONDAY-FRIDAY 8AM-5PM:  Pager# [phone] ([facility]) or [phone] ([facility])    NON-URGENT CONSULTS:  Please email [email or web address]  AT NIGHT AND ON WEEKENDS:  Contact on-call GI fellow via answering service ([phone]) from 5pm-8am and on weekends/holidays

## 2023-10-03 NOTE — PATIENT PROFILE ADULT - PATIENT REPRESENTATIVE: ( YOU CAN CHOOSE ANY PERSON THAT CAN ASSIST YOU WITH YOUR HEALTH CARE PREFERENCES, DOES NOT HAVE TO BE A SPOUSE, IMMEDIATE FAMILY OR SIGNIFICANT OTHER/PARTNER)
Virtual Regular Visit    Verification of patient location:    Patient is located in the following state in which I hold an active license [place]      Assessment/Plan:    Problem List Items Addressed This Visit        Nervous and Auditory    Traumatic brain injury with loss of consciousness ([facility] [address] )       Other    Psychosis ([facility] [address] ) - Primary    Reactive depression       Goals addressed in session: Goal 1          Reason for visit is VIRTUAL PHP DUE TO COVID-19    Encounter provider [facility] PARTIAL [address]    Provider located at [address] [place] [id number]      Recent Visits  Date Type Provider Dept   11/16/22 Office Visit [facility] PARTIAL PSYCH GROUP THERAPY [facility]   11/15/22 Office Visit [facility] PARTIAL PSYCH GROUP THERAPY [facility]   Showing recent visits within past 7 days and meeting all other requirements  Today's Visits  Date Type Provider Dept   11/17/22 Office Visit [facility] PARTIAL PSYCH GROUP THERAPY [facility]   Showing today's visits and meeting all other requirements  Future Appointments  No visits were found meeting these conditions  Showing future appointments within next 150 days and meeting all other requirements       The patient was identified by name and date of birth  [facility]  was informed that this is a telemedicine visit and that the visit is being conducted [employer]  He agrees to proceed     My office door was closed  No one else was in the room  He acknowledged consent and understanding of privacy and security of the video platform  The patient has agreed to participate and understands they can discontinue the visit at any time  Patient is aware this is a billable service  Ronald Suggs  is a 48 y o  male         HPI     Past Medical History:   Diagnosis Date   • Chemical exposure    • Diabetes mellitus ([facility] [address] )    • H/O bone graft    • Head injury     August 25, 2019 fell backwards hit head on a boulder w/ LOC   • Head injury     as an air [place] with the [employer] - jumped out of a plane parachute disfunction   • Hypertension    • Seizures ([address] )        Past Surgical History:   Procedure Laterality Date   • FL LUMBAR PUNCTURE DIAGNOSTIC  12/13/2019   • KNEE ARTHROSCOPY W/ MENISCECTOMY Left    • LEG SURGERY     • TONSILLECTOMY         Current Outpatient Medications   Medication Sig Dispense Refill   • amLODIPine (NORVASC) 10 mg tablet Take 10 mg by mouth daily     • amoxicillin (AMOXIL) 500 mg capsule TAKE 1 CAPSULE BY MOUTH TWICE DAILY FOR 10 DAYS     • aspirin 81 mg chewable tablet Chew 81 mg daily     • atorvastatin (LIPITOR) 20 mg tablet Take 20 mg by mouth every evening     • diazepam (VALIUM) 2 mg tablet Take 1 tablet (2 mg total) by mouth daily as needed for anxiety (severe anxiety only) 30 tablet 0   • divalproex sodium (DEPAKOTE) 250 mg EC tablet TAKE 1 TABLET BY MOUTH TWICE DAILY WITH  ONE  500  MG  TAB  FOR  TOAL  DOSE  OF  750  MG  TWICE  A  DAY 60 tablet 11   • divalproex sodium (DEPAKOTE) 500 mg EC tablet Take 1 tab twice a day with one 250 mg tab for total dose of 750 mg twice a day  60 tablet 11   • ergocalciferol (VITAMIN D2) 50,000 units Take 1 capsule (50,000 Units total) by mouth once a week for 8 doses 8 capsule 0   • haloperidol (HALDOL) 5 mg tablet Take 1 tablet (5 mg total) by mouth daily Start with half a tablet daily, increase as tolerated to one whole tablets or half a tablet twice a day   30 tablet 1   • ibuprofen (MOTRIN) 800 mg tablet daily as needed     • insulin NPH-insulin regular (NovoLIN 70/30) 100 units/mL subcutaneous injection Inject 10 Units under the skin daily before breakfast (Patient taking differently: Inject 12 Units under the skin daily before breakfast) 3 mL 0   • Jardiance 25 MG TABS Take 25 mg by mouth daily     • lamoTRIgine (LaMICtal) 100 mg tablet Take 1 tab (100 mg) twice a day 180 tablet 3   • lisinopril (ZESTRIL) 40 mg tablet Take 1 tablet by mouth daily     • metFORMIN (GLUCOPHAGE) 1000 MG tablet Take 1,000 mg by mouth 2 (two) times a day with meals     • metoprolol tartrate (LOPRESSOR) 100 mg tablet TAKE 1 TABLET BY MOUTH EVERY 12 HOURS 180 tablet 0   • OLANZapine (ZyPREXA) 20 MG tablet TAKE 1 TABLET BY MOUTH ONCE DAILY AT BEDTIME 30 tablet 5   • sertraline (Zoloft) 100 mg tablet Take 1 tablet (100 mg total) by mouth daily 30 tablet 1   • Ventolin  (90 Base) MCG/ACT inhaler Inhale 2 puffs every 6 (six) hours as needed     • Vitamin D, Cholecalciferol, 50 MCG (2000 UT) CAPS Take 1 capsule by mouth once daily 30 capsule 5   • zaleplon (SONATA) 5 MG capsule Take 1 capsule (5 mg total) by mouth daily at bedtime 30 capsule 5     No current facility-administered medications for this visit  No Known Allergies    Review of Systems    Video Exam    There were no vitals filed for this visit  Physical Exam     Visit Time  I have spent FOUR GROUP HOURS + CASE MANAGEMENT minutes with patient today in which greater than 50% of the time was spent in counseling/coordination of care regarding PHP- see notes  yes

## 2023-10-03 NOTE — CONSULT NOTE ADULT - SUBJECTIVE AND OBJECTIVE BOX
HPI:  Patient is a 59 yo M PMHx EtOH abuse (3-5 double nayana whiskey 5 days a week x25 years) decompensated cirrhosis requiring numerous therapeutic paracentesis (last ), prior DVT, prior positive C.diff presenting with 10/10 abdominal pain. Patient states it is similar to abdominal pain that he has had in the past which improved with paracentesis. Patient states he noticed the pain was worse after eating and was generalized. He denies any etoh use since . He yoshi to [facility] on  with abdominal pain and was found to have WBC 20, lactate 3.9 (downtrended to 1) and CT w/ evidence of portal hypertension, moderate ascites, and portal enterocolopathy. He was just tapered off prednisone last week for alc hep.     In ED, VSS. WBC 16, Plts 97, Na 127, Tbili 5.7, , AST 83, ALT 41. CT A/P IVC - Cirrhosis and portal hypertension. Moderate ascites. Distended gallbladder with new dilatation of the CBD.     Allergies:  cortisone (Swelling; Rash)    Home Medications:  Multiple Vitamins oral tablet: 1 tab(s) orally once a day (19 Sep 2023 08:16)  spironolactone 100 mg oral tablet: 1 tab(s) orally 2 times a day (19 Sep 2023 08:16)    Hospital Medications:  furosemide    Tablet 40 milliGRAM(s) Oral every 12 hours  lactulose Syrup 15 Gram(s) Oral two times a day  LORazepam     Tablet 2 milliGRAM(s) Oral every 2 hours PRN  LORazepam   Injectable 2 milliGRAM(s) IV Push every 1 hour PRN  piperacillin/tazobactam IVPB. 3.375 Gram(s) IV Intermittent once  piperacillin/tazobactam IVPB.- 3.375 Gram(s) IV Intermittent once  spironolactone 100 milliGRAM(s) Oral two times a day    PMHX/PSHX:  ETOH abuse    S/P left knee surgery    Family history:  FHx: myocardial infarction (Father)    Denies family history of colon cancer/polyps, stomach cancer/polyps, pancreatic cancer/masses, liver cancer/disease, ovarian cancer and endometrial cancer.    Social History:   Tob: Denies  EtOH: Denies  Illicit Drugs: Denies    ROS:   General:  No wt loss, fevers, chills   ENT:  No sore throat, pain   CV:  No pain, palpitations   Pulm:  No dyspnea, cough   GI:  see HPI  :  No pain, bleeding   Muscle:  No pain, weakness  Neuro:  No weakness, tingling   Psych:  No fatigue, insomnia   Endocrine:  No polyuria, polydipsia   Heme:  No petechiae, ecchymosis   Skin:  No rash, tattoos     PHYSICAL EXAM:   GENERAL:  No acute distress  HEENT:  NCAT, + scleral icterus   CHEST:  no respiratory distress  HEART:  Regular rate and rhythm  ABDOMEN:  Soft, non-tender, non-distended   EXTREMITIES: No edema  SKIN:  No rash/erythema/ecchymoses. +jaundice  NEURO:  Alert and oriented x 3     Vital Signs:  Vital Signs Last 24 Hrs  T(C): 36.8 (03 Oct 2023 13:45), Max: 37.1 (03 Oct 2023 12:14)  T(F): 98.2 (03 Oct 2023 13:45), Max: 98.7 (03 Oct 2023 12:14)  HR: 83 (03 Oct 2023 13:45) (80 - 90)  BP: 125/61 (03 Oct 2023 13:45) (102/59 - 131/96)  BP(mean): --  RR: 19 (03 Oct 2023 13:45) (17 - 24)  SpO2: 99% (03 Oct 2023 13:45) (92% - 100%)    Parameters below as of 03 Oct 2023 13:45  Patient On (Oxygen Delivery Method): room air    Daily Height in cm: 172.72 (03 Oct 2023 13:45)    Daily     LABS:                        13.1   16.61 )-----------( 97       ( 03 Oct 2023 06:47 )             35.9     Mean Cell Volume: 95.0 fL (10-03-23 @ 06:47)    10-03    127<L>  |  95<L>  |  16  ----------------------------<  135<H>  4.7   |  19<L>  |  1.05    Ca    9.1      03 Oct 2023 06:47    TPro  6.7  /  Alb  3.0<L>  /  TBili  5.7<H>  /  DBili  x   /  AST  83<H>  /  ALT  41  /  AlkPhos  260<H>  10-03    LIVER FUNCTIONS - ( 03 Oct 2023 06:47 )  Alb: 3.0 g/dL / Pro: 6.7 g/dL / ALK PHOS: 260 U/L / ALT: 41 U/L / AST: 83 U/L / GGT: x           Urinalysis Basic - ( 03 Oct 2023 12:23 )    Color: Dark Yellow / Appearance: Clear / S.047 / pH: x  Gluc: x / Ketone: Trace mg/dL  / Bili: Moderate / Urobili: 1.0 mg/dL   Blood: x / Protein: Trace mg/dL / Nitrite: Negative   Leuk Esterase: Trace / RBC: 2 /HPF / WBC 0 /HPF   Sq Epi: x / Non Sq Epi: 0 /HPF / Bacteria: Negative /HPF    Amylase Serum--      Lipase serum--       [id number]  Amylase Serum--      Lipase serum36       Ammonia--                          13.1   16.61 )-----------( 97       ( 03 Oct 2023 06:47 )             35.9       Imaging:             HPI:  Patient is a 59 yo M PMHx EtOH abuse (3-5 double nayana whiskey 5 days a week x25 years) decompensated cirrhosis requiring numerous therapeutic paracentesis (last ), prior DVT, prior positive C.diff presenting with 10/10 abdominal pain. Patient states it is similar to abdominal pain that he has had in the past which improved with paracentesis. Patient states he noticed the pain was worse after eating and was generalized. He denies any etoh use since . He yoshi to [facility] on  with abdominal pain and was found to have WBC 20, lactate 3.9 (downtrended to 1) and CT w/ evidence of portal hypertension, moderate ascites, and portal enterocolopathy. He was just tapered off prednisone last week for alc hep.     In ED, VSS. WBC 16, Plts 97, Na 127, Tbili 5.7, , AST 83, ALT 41. CT A/P IVC - Cirrhosis and portal hypertension. Moderate ascites. Distended gallbladder with new dilatation of the CBD.     Allergies:  cortisone (Swelling; Rash)    Home Medications:  Multiple Vitamins oral tablet: 1 tab(s) orally once a day (19 Sep 2023 08:16)  spironolactone 100 mg oral tablet: 1 tab(s) orally 2 times a day (19 Sep 2023 08:16)    Hospital Medications:  furosemide    Tablet 40 milliGRAM(s) Oral every 12 hours  lactulose Syrup 15 Gram(s) Oral two times a day  LORazepam     Tablet 2 milliGRAM(s) Oral every 2 hours PRN  LORazepam   Injectable 2 milliGRAM(s) IV Push every 1 hour PRN  piperacillin/tazobactam IVPB. 3.375 Gram(s) IV Intermittent once  piperacillin/tazobactam IVPB.- 3.375 Gram(s) IV Intermittent once  spironolactone 100 milliGRAM(s) Oral two times a day    PMHX/PSHX:  ETOH abuse    S/P left knee surgery    Family history:  FHx: myocardial infarction (Father)    Denies family history of colon cancer/polyps, stomach cancer/polyps, pancreatic cancer/masses, liver cancer/disease, ovarian cancer and endometrial cancer.    Social History:   Tob: Denies  EtOH: Denies  Illicit Drugs: Denies    ROS:   General:  No wt loss, fevers, chills   ENT:  No sore throat, pain   CV:  No pain, palpitations   Pulm:  No dyspnea, cough   GI:  see HPI  :  No pain, bleeding   Muscle:  No pain, weakness  Neuro:  No weakness, tingling   Psych:  No fatigue, insomnia   Endocrine:  No polyuria, polydipsia   Heme:  No petechiae, ecchymosis   Skin:  No rash, tattoos     PHYSICAL EXAM:   GENERAL:  No acute distress  HEENT:  NCAT, + scleral icterus   CHEST:  no respiratory distress  HEART:  Regular rate and rhythm  ABDOMEN:  Soft, non-tender, +distended  EXTREMITIES: No edema  SKIN:  No rash/erythema/ecchymoses. +jaundice  NEURO:  Alert and oriented x 3     Vital Signs:  Vital Signs Last 24 Hrs  T(C): 36.8 (03 Oct 2023 13:45), Max: 37.1 (03 Oct 2023 12:14)  T(F): 98.2 (03 Oct 2023 13:45), Max: 98.7 (03 Oct 2023 12:14)  HR: 83 (03 Oct 2023 13:45) (80 - 90)  BP: 125/61 (03 Oct 2023 13:45) (102/59 - 131/96)  BP(mean): --  RR: 19 (03 Oct 2023 13:45) (17 - 24)  SpO2: 99% (03 Oct 2023 13:45) (92% - 100%)    Parameters below as of 03 Oct 2023 13:45  Patient On (Oxygen Delivery Method): room air    Daily Height in cm: 172.72 (03 Oct 2023 13:45)    Daily     LABS:                        13.1   16.61 )-----------( 97       ( 03 Oct 2023 06:47 )             35.9     Mean Cell Volume: 95.0 fL (10-03-23 @ 06:47)    10    127<L>  |  95<L>  |  16  ----------------------------<  135<H>  4.7   |  19<L>  |  1.05    Ca    9.1      03 Oct 2023 06:47    TPro  6.7  /  Alb  3.0<L>  /  TBili  5.7<H>  /  DBili  x   /  AST  83<H>  /  ALT  41  /  AlkPhos  260<H>  10-03    LIVER FUNCTIONS - ( 03 Oct 2023 06:47 )  Alb: 3.0 g/dL / Pro: 6.7 g/dL / ALK PHOS: 260 U/L / ALT: 41 U/L / AST: 83 U/L / GGT: x           Urinalysis Basic - ( 03 Oct 2023 12:23 )    Color: Dark Yellow / Appearance: Clear / S.047 / pH: x  Gluc: x / Ketone: Trace mg/dL  / Bili: Moderate / Urobili: 1.0 mg/dL   Blood: x / Protein: Trace mg/dL / Nitrite: Negative   Leuk Esterase: Trace / RBC: 2 /HPF / WBC 0 /HPF   Sq Epi: x / Non Sq Epi: 0 /HPF / Bacteria: Negative /HPF    Amylase Serum--      Lipase serum--       [id number]  Amylase Serum--      Lipase serum36       Ammonia--                          13.1   16.61 )-----------( 97       ( 03 Oct 2023 06:47 )             35.9       Imaging:    ACC: [id number] EXAM:  CT ABDOMEN AND PELVIS IC   ORDERED BY: KARINA MONTOYA     PROCEDURE DATE:  10/03/2023          INTERPRETATION:  CLINICAL INFORMATION: Abdominal pain, history of   cirrhosis    COMPARISON: CT abdomen pelvis 2023    CONTRAST/COMPLICATIONS:  IV Contrast: Omnipaque 350  95 cc administered   5 cc discarded  Oral Contrast: NONE  Complications: None reported at time of study completion    PROCEDURE:  CT of the Abdomen and Pelvis was performed.  Sagittal and coronal reformats were performed.    FINDINGS:  LOWER CHEST: Linear atelectasis at the right lung base. Left basilar   atelectasis.    LIVER: Cirrhosis.  BILE DUCTS: CBD dilation to 1.2 cm number new since 2023.  GALLBLADDER: Distended gallbladder with small layering sludge.  SPLEEN: Splenomegaly.  PANCREAS: Within normal limits.  ADRENALS: Within normal limits.  KIDNEYS/URETERS: Within normal limits.    BLADDER: Within normal limits.  REPRODUCTIVE ORGANS: Prostate within normal limits.    BOWEL: No bowelobstruction. Appendix is normal. Diverticulosis without   evidence of diverticulitis.  PERITONEUM: Moderate ascites.  VESSELS: Paraumbilical, paraesophageal, gastrohepatic and perisplenic   collaterals. Small splenorenal shunt.  RETROPERITONEUM/LYMPHNODES: No lymphadenopathy.  ABDOMINAL WALL: Fat-containing left inguinal hernia. Ascites filled   umbilical hernia..  BONES: Within normal limits.    IMPRESSION:  Cirrhosis and portal hypertension. Moderate ascites.  Distended gallbladder with new dilatation of the CBD. Correlate with LFTs.    --- End of Report ---    ERIK JOSE MD; Resident Radiologist  This document has been electronically signed.  DWAINE HYDE MD; Attending Radiologist  This document has been electronically signed. Oct  3 2023 10:13AM

## 2023-10-03 NOTE — H&P ADULT - PROBLEM SELECTOR PLAN 3
- symptom-triggered CIWA w/ ativan   - PO thiamine 100mg daily, folic acid daily patient with sodium of 127 on admission ISO   - furosemide 40 qd, spironolactone 100 qd while hospitalized   - urine osm/urine sodium  - fluid restrict patient with sodium of 127 on admission ISO   - furosemide 40 qd, spironolactone 100 qd while hospitalized   - urine osm/urine sodium  - fluid restricted to 1000cc

## 2023-10-03 NOTE — ED ADULT NURSE NOTE - OBJECTIVE STATEMENT
58 year old with history cirrhosis c/o epigastric pain and shortness of breath started this morning. Endorses  chills. denies fever, chest pain, nausea, vomiting, stool. Pt states he is  for a paracentesis this Friday. Pt states he gets them monthly. 18g placed in left a/c , NSR on cardiac monitor. Abdomen distended non-tender.

## 2023-10-03 NOTE — ED PROVIDER NOTE - OBJECTIVE STATEMENT
Patient is a 58-year-old male with history of liver cirrhosis secondary to alcohol use disorder presenting to the emergency department  with abdominal pain.  Patient states pain woke him up last night, it is diffuse, nonradiating.  Has had pain like this before and when he required paracentesis use in the past.  Patient was recently at [place] under different N where he was discharged home.   No fever, chills,  vomiting but patient is nauseous.  No urinary or bowel changes.

## 2023-10-04 LAB
ALBUMIN SERPL ELPH-MCNC: 2.9 G/DL — LOW (ref 3.3–5)
ALP SERPL-CCNC: 249 U/L — HIGH (ref 40–120)
ALT FLD-CCNC: 43 U/L — HIGH (ref 4–41)
ANION GAP SERPL CALC-SCNC: 13 MMOL/L — SIGNIFICANT CHANGE UP (ref 7–14)
APTT BLD: 39.8 SEC — HIGH (ref 24.5–35.6)
AST SERPL-CCNC: 79 U/L — HIGH (ref 4–40)
BASOPHILS # BLD AUTO: 0.1 K/UL — SIGNIFICANT CHANGE UP (ref 0–0.2)
BASOPHILS NFR BLD AUTO: 1 % — SIGNIFICANT CHANGE UP (ref 0–2)
BILIRUB SERPL-MCNC: 5.6 MG/DL — HIGH (ref 0.2–1.2)
BLD GP AB SCN SERPL QL: NEGATIVE — SIGNIFICANT CHANGE UP
BUN SERPL-MCNC: 15 MG/DL — SIGNIFICANT CHANGE UP (ref 7–23)
CALCIUM SERPL-MCNC: 8.8 MG/DL — SIGNIFICANT CHANGE UP (ref 8.4–10.5)
CHLORIDE SERPL-SCNC: 92 MMOL/L — LOW (ref 98–107)
CO2 SERPL-SCNC: 22 MMOL/L — SIGNIFICANT CHANGE UP (ref 22–31)
CREAT SERPL-MCNC: 0.98 MG/DL — SIGNIFICANT CHANGE UP (ref 0.5–1.3)
CULTURE RESULTS: NO GROWTH — SIGNIFICANT CHANGE UP
EGFR: 90 ML/MIN/1.73M2 — SIGNIFICANT CHANGE UP
EOSINOPHIL # BLD AUTO: 0.25 K/UL — SIGNIFICANT CHANGE UP (ref 0–0.5)
EOSINOPHIL NFR BLD AUTO: 2.5 % — SIGNIFICANT CHANGE UP (ref 0–6)
GLUCOSE SERPL-MCNC: 95 MG/DL — SIGNIFICANT CHANGE UP (ref 70–99)
HCT VFR BLD CALC: 36.5 % — LOW (ref 39–50)
HGB BLD-MCNC: 12.7 G/DL — LOW (ref 13–17)
IANC: 7.49 K/UL — HIGH (ref 1.8–7.4)
IMM GRANULOCYTES NFR BLD AUTO: 0.7 % — SIGNIFICANT CHANGE UP (ref 0–0.9)
INR BLD: 1.43 RATIO — HIGH (ref 0.85–1.18)
LYMPHOCYTES # BLD AUTO: 1.25 K/UL — SIGNIFICANT CHANGE UP (ref 1–3.3)
LYMPHOCYTES # BLD AUTO: 12.6 % — LOW (ref 13–44)
MAGNESIUM SERPL-MCNC: 1.7 MG/DL — SIGNIFICANT CHANGE UP (ref 1.6–2.6)
MCHC RBC-ENTMCNC: 34.1 PG — HIGH (ref 27–34)
MCHC RBC-ENTMCNC: 34.8 GM/DL — SIGNIFICANT CHANGE UP (ref 32–36)
MCV RBC AUTO: 98.1 FL — SIGNIFICANT CHANGE UP (ref 80–100)
MELD SCORE WITH DIALYSIS: 33 POINTS — SIGNIFICANT CHANGE UP
MELD SCORE WITHOUT DIALYSIS: 25 POINTS — SIGNIFICANT CHANGE UP
MONOCYTES # BLD AUTO: 0.76 K/UL — SIGNIFICANT CHANGE UP (ref 0–0.9)
MONOCYTES NFR BLD AUTO: 7.7 % — SIGNIFICANT CHANGE UP (ref 2–14)
NEUTROPHILS # BLD AUTO: 7.49 K/UL — HIGH (ref 1.8–7.4)
NEUTROPHILS NFR BLD AUTO: 75.5 % — SIGNIFICANT CHANGE UP (ref 43–77)
NRBC # BLD: 0 /100 WBCS — SIGNIFICANT CHANGE UP (ref 0–0)
NRBC # FLD: 0 K/UL — SIGNIFICANT CHANGE UP (ref 0–0)
PHOSPHATE SERPL-MCNC: 3.8 MG/DL — SIGNIFICANT CHANGE UP (ref 2.5–4.5)
PLATELET # BLD AUTO: 84 K/UL — LOW (ref 150–400)
POTASSIUM SERPL-MCNC: 4.7 MMOL/L — SIGNIFICANT CHANGE UP (ref 3.5–5.3)
POTASSIUM SERPL-SCNC: 4.7 MMOL/L — SIGNIFICANT CHANGE UP (ref 3.5–5.3)
PROT SERPL-MCNC: 6.6 G/DL — SIGNIFICANT CHANGE UP (ref 6–8.3)
PROTHROM AB SERPL-ACNC: 15.9 SEC — HIGH (ref 9.5–13)
RBC # BLD: 3.72 M/UL — LOW (ref 4.2–5.8)
RBC # FLD: 15 % — HIGH (ref 10.3–14.5)
RH IG SCN BLD-IMP: POSITIVE — SIGNIFICANT CHANGE UP
SODIUM SERPL-SCNC: 127 MMOL/L — LOW (ref 135–145)
SPECIMEN SOURCE: SIGNIFICANT CHANGE UP
WBC # BLD: 9.92 K/UL — SIGNIFICANT CHANGE UP (ref 3.8–10.5)
WBC # FLD AUTO: 9.92 K/UL — SIGNIFICANT CHANGE UP (ref 3.8–10.5)

## 2023-10-04 PROCEDURE — 73020 X-RAY EXAM OF SHOULDER: CPT | Mod: 26,RT

## 2023-10-04 PROCEDURE — 99232 SBSQ HOSP IP/OBS MODERATE 35: CPT | Mod: GC

## 2023-10-04 PROCEDURE — 73060 X-RAY EXAM OF HUMERUS: CPT | Mod: 26,RT

## 2023-10-04 PROCEDURE — 99233 SBSQ HOSP IP/OBS HIGH 50: CPT | Mod: GC

## 2023-10-04 RX ORDER — FUROSEMIDE 40 MG
40 TABLET ORAL DAILY
Refills: 0 | Status: DISCONTINUED | OUTPATIENT
Start: 2023-10-04 | End: 2023-10-07

## 2023-10-04 RX ORDER — HEPARIN SODIUM 5000 [USP'U]/ML
5000 INJECTION INTRAVENOUS; SUBCUTANEOUS EVERY 12 HOURS
Refills: 0 | Status: DISCONTINUED | OUTPATIENT
Start: 2023-10-04 | End: 2023-10-06

## 2023-10-04 RX ORDER — PIPERACILLIN AND TAZOBACTAM 4; .5 G/20ML; G/20ML
3.38 INJECTION, POWDER, LYOPHILIZED, FOR SOLUTION INTRAVENOUS EVERY 8 HOURS
Refills: 0 | Status: DISCONTINUED | OUTPATIENT
Start: 2023-10-04 | End: 2023-10-06

## 2023-10-04 RX ADMIN — LACTULOSE 15 GRAM(S): 10 SOLUTION ORAL at 05:47

## 2023-10-04 RX ADMIN — SPIRONOLACTONE 100 MILLIGRAM(S): 25 TABLET, FILM COATED ORAL at 05:47

## 2023-10-04 RX ADMIN — PIPERACILLIN AND TAZOBACTAM 25 GRAM(S): 4; .5 INJECTION, POWDER, LYOPHILIZED, FOR SOLUTION INTRAVENOUS at 22:50

## 2023-10-04 RX ADMIN — Medication 40 MILLIGRAM(S): at 17:55

## 2023-10-04 RX ADMIN — LACTULOSE 15 GRAM(S): 10 SOLUTION ORAL at 17:52

## 2023-10-04 RX ADMIN — Medication 1 TABLET(S): at 13:33
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
## 2023-10-04 NOTE — PROGRESS NOTE ADULT - ASSESSMENT
Patient is a 59 yo M PMHx EtOH abuse (3-5 double Syriac whiskey 5 days a week x25 years) decompensated cirrhosis requiring numerous therapeutic paracentesis (last 9/26), prior DVT, prior positive C.diff presenting with 10/10 abdominal pain. Patient found to have Tbili elevated from baseline, as well as leukocytosis. CT A/P w/ CBD dilation.    #C/f biliary obstruction vs cholangitis  - Tbili elevated in 5s (3s at time of discharge). WBC elevated although has been on prednisone taper for alc hep until recently.  - CT A/P IVC - distended gallbladder with new dilatation of the CBD.   #Cirrhosis 2/2 AUD. Moderate ascites. No recent EGD.  - hypoNa to 127    Recommendations  - c/w broad spectrum abx  - f/u Bcx x2  - trend CBC, CMP, coags  - please monitor for decompensation, hemodynamic instability in which case may require endoscopic intervention more urgently  - please keep npo after MN for possible EUS +/- ERCP tomorrow  - optimization of Na, goal ~130 for endoscopic procedure    GI will continue to follow.     All recommendations are tentative until note is attested by attending.     Piper Castañeda, PGY6    Gastroenterology/Hepatology Fellow  Available on [employer] Teams  [phone] ("[employer]" Short Range Pager)  [phone] (Long Range Pager)    After 5pm, please contact the on-call GI fellow. [phone]
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
## 2023-10-04 NOTE — PROGRESS NOTE ADULT - ASSESSMENT
58M pmhx EtOH abuse, decompensated cirrhosis requiring numerous therapeutic paracentesis (last 9/26), prior remote hx of DVT not on AC, prior positive C.diff presenting with 10/10 abdominal pain x1 day. Found to have moderate ascites s/p diagnostic paracentesis w/out evidence of SBP and dilated CBD.
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
## 2023-10-04 NOTE — CONSULT NOTE ADULT - SUBJECTIVE AND OBJECTIVE BOX
HPI:  58M pmhx EtOH abuse (3-5 double [place] whiskey 5 days a week x25 years) decompensated cirrhosis requiring numerous therapeutic paracentesis (last 9/26), prior DVT, prior positive C.diff and recent admission for alcoholic hepatitis in July with steroid taper complete 9/28 presenting with 10/10 abdominal pain since last night. States it is similar to abdominal pain that he has had in the pain that improves with paracentesis. Patient describes pain as tight and constant. Does not report exacerbating factors and reports that he pain of this nature always decreases after paracentesis. Patient also endorses chills and cold sweats that occur along with the pain which are also relieved after paracentesis.   No prior alcohol withdrawal noted. Including no ICU, DTs, or intubations.     Was at [facility] on 9/29 with abdominal pain. Found to have WBC 20, lactate 3.9 (downtrended to 1) and CT w/ evidence of portal hypertension, moderate ascites, and portal enterocolopathy.     Today in ED patient received diagnostic paracentesis showing cells to 83, but significant red blood. Patient s/p 2000mg ceftriaxone. CT scan showing Cirrhosis and portal hypertension. Moderate ascites. Distended gallbladder with new dilatation of the CBD.     (03 Oct 2023 14:08)      14 point ROS otherwise negative    PAST MEDICAL & SURGICAL HISTORY:  ETOH abuse      S/P left knee surgery          Allergies    cortisone (Swelling; Rash)    Intolerances        MEDICATIONS  (STANDING):  furosemide    Tablet 40 milliGRAM(s) Oral daily  heparin   Injectable 5000 Unit(s) SubCutaneous every 12 hours  lactulose Syrup 15 Gram(s) Oral two times a day  multivitamin 1 Tablet(s) Oral daily  spironolactone 100 milliGRAM(s) Oral daily    MEDICATIONS  (PRN):      FAMILY HISTORY:  FHx: myocardial infarction (Father)        SOCIAL HISTORY: No EtOH, no tobacco    Height (cm): 172.7 (10-03 @ 13:45)  Weight (kg): 98.2 (10-03 @ 13:45)  BMI (kg/m2): 32.9 (10-03 @ 13:45)  BSA (m2): 2.11 (10-03 @ 13:45)    VITALS:   T(F): 98.1 (10-04-23 @ 05:19), Max: 98.7 (10-03-23 @ 12:14)  HR: 84 (10-04-23 @ 05:19)  BP: 106/62 (10-04-23 @ 05:19)  RR: 17 (10-04-23 @ 05:19)  SpO2: 95% (10-04-23 @ 05:19)  Wt(kg): --    PHYSICAL EXAM    GENERAL: NAD, well-developed  HEAD:  Atraumatic, Normocephalic  EYES: EOMI, PERRLA, conjunctiva and sclera clear  NECK: Supple, No JVD  CHEST/LUNG: Clear to auscultation bilaterally; No wheeze  HEART: Regular rate and rhythm; No murmurs, rubs, or gallops  ABDOMEN: Soft, Nontender, Nondistended; Bowel sounds present  EXTREMITIES:  2+ Peripheral Pulses, No clubbing, cyanosis, or edema  NEUROLOGY: non-focal  SKIN: No rashes or lesions    LABS:                         12.7   9.92  )-----------( 84       ( 04 Oct 2023 06:01 )             36.5     10-04    127<L>  |  92<L>  |  15  ----------------------------<  95  4.7   |  22  |  0.98    Ca    8.8      04 Oct 2023 06:01  Phos  3.8     10-04  Mg     1.70     10-04    TPro  6.6  /  Alb  2.9<L>  /  TBili  5.6<H>  /  DBili  x   /  AST  79<H>  /  ALT  43<H>  /  AlkPhos  249<H>  10-04    Magnesium: 1.70 mg/dL (10-04 @ 06:01)  Phosphorus: 3.8 mg/dL (10-04 @ 06:01)    PT/INR - ( 04 Oct 2023 06:01 )   PT: 15.9 sec;   INR: 1.43 ratio         PTT - ( 04 Oct 2023 06:01 )  PTT:39.8 sec  Ascites Fl Ascites Fluid  10-03 @ 09:31 --  --    polymorphonuclear leukocytes seen  No organisms seen  by cytocentrifuge      Ascites Fl Ascites Fluid  09-19 @ 17:37   No growth at 5 days  --    No polymorphonuclear cells seen  No organisms seen  by cytocentrifuge      Peritoneal Peritoneal Fluid  07-20 @ 15:55   No growth at 5 days  --    polymorphonuclear leukocytes seen  No organisms seen  by cytocentrifuge      .Blood Blood-Peripheral  07-20 @ 15:15   No growth at 5 days  --  --      Clean Catch Clean Catch (Midstream)  07-20 @ 13:50   <10,000 CFU/mL Normal Urogenital Erica  --  --      .Stool Feces  07-19 @ 11:06   No enteric pathogens isolated.  (Stool culture examined for Salmonella,  Shigella, Campylobacter, Aeromonas, Plesiomonas,  Vibrio, E.coli O157 and Yersinia)  --  --          IMAGING:

## 2023-10-04 NOTE — PROGRESS NOTE ADULT - PROBLEM SELECTOR PLAN 4
Activity: Bed rest  Bowel reg: lactulose  Consultants: GI  DVT ppx: heparin subq 5000 bid   Diet: regular with fluid restriction   Dispo: Pending optimization  Directive: Full code  Electrolytes:  Fluids: Consider albumin for HoTN

## 2023-10-04 NOTE — PROGRESS NOTE ADULT - PROBLEM SELECTOR PLAN 3
patient with sodium of 127 on admission ISO   - furosemide 40 qd, spironolactone 100 qd while hospitalized   - urine osm/urine sodium  - fluid restricted to 1000cc patient with sodium of 127 today with 127 on admission ISO liver failure  - furosemide 40 qd, spironolactone 100 qd while hospitalized   - urine osm/urine sodium  - fluid restricted to 1000cc

## 2023-10-05 DIAGNOSIS — K83.8 OTHER SPECIFIED DISEASES OF BILIARY TRACT: ICD-10-CM

## 2023-10-05 LAB
ALBUMIN SERPL ELPH-MCNC: 2.8 G/DL — LOW (ref 3.3–5)
ALP SERPL-CCNC: 238 U/L — HIGH (ref 40–120)
ALT FLD-CCNC: 35 U/L — SIGNIFICANT CHANGE UP (ref 4–41)
ANION GAP SERPL CALC-SCNC: 13 MMOL/L — SIGNIFICANT CHANGE UP (ref 7–14)
APTT BLD: 37.8 SEC — HIGH (ref 24.5–35.6)
AST SERPL-CCNC: 62 U/L — HIGH (ref 4–40)
BILIRUB SERPL-MCNC: 4.3 MG/DL — HIGH (ref 0.2–1.2)
BLD GP AB SCN SERPL QL: NEGATIVE — SIGNIFICANT CHANGE UP
BUN SERPL-MCNC: 17 MG/DL — SIGNIFICANT CHANGE UP (ref 7–23)
CALCIUM SERPL-MCNC: 8.8 MG/DL — SIGNIFICANT CHANGE UP (ref 8.4–10.5)
CHLORIDE SERPL-SCNC: 95 MMOL/L — LOW (ref 98–107)
CO2 SERPL-SCNC: 20 MMOL/L — LOW (ref 22–31)
CREAT SERPL-MCNC: 1.01 MG/DL — SIGNIFICANT CHANGE UP (ref 0.5–1.3)
EGFR: 87 ML/MIN/1.73M2 — SIGNIFICANT CHANGE UP
GLUCOSE SERPL-MCNC: 117 MG/DL — HIGH (ref 70–99)
HCT VFR BLD CALC: 37 % — LOW (ref 39–50)
HGB BLD-MCNC: 12.8 G/DL — LOW (ref 13–17)
INR BLD: 1.5 RATIO — HIGH (ref 0.85–1.18)
MAGNESIUM SERPL-MCNC: 1.6 MG/DL — SIGNIFICANT CHANGE UP (ref 1.6–2.6)
MCHC RBC-ENTMCNC: 33.6 PG — SIGNIFICANT CHANGE UP (ref 27–34)
MCHC RBC-ENTMCNC: 34.6 GM/DL — SIGNIFICANT CHANGE UP (ref 32–36)
MCV RBC AUTO: 97.1 FL — SIGNIFICANT CHANGE UP (ref 80–100)
NRBC # BLD: 0 /100 WBCS — SIGNIFICANT CHANGE UP (ref 0–0)
NRBC # FLD: 0 K/UL — SIGNIFICANT CHANGE UP (ref 0–0)
PHOSPHATE SERPL-MCNC: 3.1 MG/DL — SIGNIFICANT CHANGE UP (ref 2.5–4.5)
PLATELET # BLD AUTO: 81 K/UL — LOW (ref 150–400)
POTASSIUM SERPL-MCNC: 4.5 MMOL/L — SIGNIFICANT CHANGE UP (ref 3.5–5.3)
POTASSIUM SERPL-SCNC: 4.5 MMOL/L — SIGNIFICANT CHANGE UP (ref 3.5–5.3)
PROT SERPL-MCNC: 6.5 G/DL — SIGNIFICANT CHANGE UP (ref 6–8.3)
PROTHROM AB SERPL-ACNC: 16.8 SEC — HIGH (ref 9.5–13)
RBC # BLD: 3.81 M/UL — LOW (ref 4.2–5.8)
RBC # FLD: 15.1 % — HIGH (ref 10.3–14.5)
RH IG SCN BLD-IMP: POSITIVE — SIGNIFICANT CHANGE UP
SODIUM SERPL-SCNC: 128 MMOL/L — LOW (ref 135–145)
WBC # BLD: 9.65 K/UL — SIGNIFICANT CHANGE UP (ref 3.8–10.5)
WBC # FLD AUTO: 9.65 K/UL — SIGNIFICANT CHANGE UP (ref 3.8–10.5)

## 2023-10-05 PROCEDURE — 99233 SBSQ HOSP IP/OBS HIGH 50: CPT | Mod: GC,25

## 2023-10-05 PROCEDURE — 49082 ABD PARACENTESIS: CPT

## 2023-10-05 PROCEDURE — 99231 SBSQ HOSP IP/OBS SF/LOW 25: CPT | Mod: GC

## 2023-10-05 RX ADMIN — Medication 1 TABLET(S): at 13:33

## 2023-10-05 RX ADMIN — LACTULOSE 15 GRAM(S): 10 SOLUTION ORAL at 05:39

## 2023-10-05 RX ADMIN — PIPERACILLIN AND TAZOBACTAM 25 GRAM(S): 4; .5 INJECTION, POWDER, LYOPHILIZED, FOR SOLUTION INTRAVENOUS at 05:39

## 2023-10-05 RX ADMIN — Medication 40 MILLIGRAM(S): at 05:39

## 2023-10-05 RX ADMIN — SPIRONOLACTONE 100 MILLIGRAM(S): 25 TABLET, FILM COATED ORAL at 05:39

## 2023-10-05 RX ADMIN — LACTULOSE 15 GRAM(S): 10 SOLUTION ORAL at 17:48

## 2023-10-05 RX ADMIN — HEPARIN SODIUM 5000 UNIT(S): 5000 INJECTION INTRAVENOUS; SUBCUTANEOUS at 05:39

## 2023-10-05 RX ADMIN — PIPERACILLIN AND TAZOBACTAM 25 GRAM(S): 4; .5 INJECTION, POWDER, LYOPHILIZED, FOR SOLUTION INTRAVENOUS at 21:49

## 2023-10-05 RX ADMIN — PIPERACILLIN AND TAZOBACTAM 25 GRAM(S): 4; .5 INJECTION, POWDER, LYOPHILIZED, FOR SOLUTION INTRAVENOUS at 13:33

## 2023-10-05 NOTE — PROGRESS NOTE ADULT - ATTENDING COMMENTS
58M with ETOH Cirhrosis recent ETOH hepatitis presenting with new onset worsening jaundice -  with new CBD dilation and elevated WBC concerning for possible biliary obstruction    Agree with empiric Abx - feeling better  ERCP tomorrow   Paracentesis - follow up cell count  Would initiate transplant labs  WIll need dobutamine stress echo when procedure completed  If MELD remains high will transfer to [facility] for opening of OLT Eval

## 2023-10-05 NOTE — PROGRESS NOTE ADULT - SUBJECTIVE AND OBJECTIVE BOX
Interval Events:   No acute events overnight.  Patient without acute symptoms at this time.    ROS:   12 point review of systems performed and negative except otherwise noted in HPI.    Hospital Medications:  furosemide    Tablet 40 milliGRAM(s) Oral daily  heparin   Injectable 5000 Unit(s) SubCutaneous every 12 hours  lactulose Syrup 15 Gram(s) Oral two times a day  multivitamin 1 Tablet(s) Oral daily  piperacillin/tazobactam IVPB.. 3.375 Gram(s) IV Intermittent every 8 hours  spironolactone 100 milliGRAM(s) Oral daily      PHYSICAL EXAM:   Vital Signs:  Vital Signs Last 24 Hrs  T(C): 36.4 (05 Oct 2023 05:34), Max: 36.9 (04 Oct 2023 14:37)  T(F): 97.6 (05 Oct 2023 05:34), Max: 98.4 (04 Oct 2023 14:37)  HR: 77 (05 Oct 2023 05:34) (75 - 81)  BP: 108/63 (05 Oct 2023 05:34) (108/63 - 128/73)  BP(mean): --  RR: 18 (05 Oct 2023 05:34) (17 - 18)  SpO2: 99% (05 Oct 2023 05:34) (98% - 100%)    Parameters below as of 05 Oct 2023 05:34  Patient On (Oxygen Delivery Method): room air      Daily     Daily     GENERAL: no acute distress  NEURO: alert  HEENT: NCAT, no conjunctival pallor appreciated  CHEST: no respiratory distress, no accessory muscle use  CARDIAC: regular rate, +S1/S2  ABDOMEN: soft, nontender, no rebound or guarding  EXTREMITIES: warm, well perfused  SKIN: no lesions noted    LABS: reviewed                        12.8   9.65  )-----------( 81       ( 05 Oct 2023 06:06 )             37.0     10-05    128<L>  |  95<L>  |  17  ----------------------------<  117<H>  4.5   |  20<L>  |  1.01    Ca    8.8      05 Oct 2023 06:06  Phos  3.1     10-05  Mg     1.60     10-05    TPro  6.5  /  Alb  2.8<L>  /  TBili  4.3<H>  /  DBili  x   /  AST  62<H>  /  ALT  35  /  AlkPhos  238<H>  10-05    LIVER FUNCTIONS - ( 05 Oct 2023 06:06 )  Alb: 2.8 g/dL / Pro: 6.5 g/dL / ALK PHOS: 238 U/L / ALT: 35 U/L / AST: 62 U/L / GGT: x             Interval Diagnostic Studies: see sunrise for full report

## 2023-10-05 NOTE — PROGRESS NOTE ADULT - PROBLEM SELECTOR PLAN 4
Activity: Bed rest  Bowel reg: lactulose  Consultants: GI  DVT ppx: heparin subq 5000 bid   Diet: regular with fluid restriction   Dispo: Pending optimization  Directive: Full code  Electrolytes:  Fluids: Consider albumin for HoTN Activity: Bed rest  Bowel reg: lactulose  Consultants: GI  DVT ppx: heparin subq 5000 bid   Diet: regular with fluid restriction   Dispo: Pending optimization  Directive: Full code  Electrolytes: Activity: No restriction  Bowel reg: Lactulose  Consultants: GI, IPT  Diet: Regular, NPO at midnight 10/5  DVT ppx: HSQ  Dispo: Home  Directive: Full code  Electrolytes:  Family:  Gtts:   Hardware: pIV

## 2023-10-05 NOTE — PROGRESS NOTE ADULT - TIME BILLING
Time spent includes direct patient care  (interview and examination of patient), discussion with other providers, support staff and/or patient's family members, review of medical records, ordering diagnostic tests and analyzing results, and documentation. Time spent includes direct patient care  (interview and examination of patient), discussion with other providers, support staff and/or patient's family members, review of medical records, ordering diagnostic tests and analyzing results, and documentation.    Seen & Examined on 10/5/23

## 2023-10-05 NOTE — PROGRESS NOTE ADULT - PROBLEM SELECTOR PLAN 3
patient with sodium of 127 today with 127 on admission ISO liver failure  - furosemide 40 qd, spironolactone 100 qd while hospitalized   - urine osm/urine sodium  - fluid restricted to 1000cc

## 2023-10-05 NOTE — CHART NOTE - NSCHARTNOTEFT_GEN_A_CORE
Invasive Procedure Team (IPT) consulted for diagnostic/therapeutic paracentesis.    Indications for Diagnostic Paracentesis:  - Consistent with specialty society guidelines, ruling out Bacterial Peritonitis (in particular, SBP) in any admitted patient with ascites (even if asymptomatic), particularly in those with c/f sepsis  - Identifying etiology for new-onset ascites    Indications for Therapeutic Paracentesis:  - Tense ascites associated with abdominal discomfort, urinary retention, respiratory distress, or other evidence of end-organ compromise that is likely 2/2 the ascites itself (and not another etiology)  - Large ascites refractory to medical management (i.e. diuretics)    Absolute Contraindications:  - DIC  - Acute Abdomen  - Cellulitis    Relative Contraindications:  - Coagulopathy (INR > 2.0 per [facility] IPT policy, but specific to performing proceduralist; refer to final recommendations below)  - Severe Thrombocytopenia (Platelet < 20-50, discuss with team/refer to final recommendations below)  - Pregnancy (discuss with team/refer to final recommendations below)  - Cellulitis/Surgical Scarring/Vasculature or Hematoma overlying only safe bedside access site  - MARISOL (specifically for therapeutic paracteneses; related to already poor intravascular perfusion. Risk of MARISOL increases by 1.5x per 1L of ascites fluid removed).    ASSESSMENT/RECOMMENDATIONS  *** INCOMPLETE ***  - Please obtain CBC/PTT/PT/INR/BMP for Cr on morning of procedure  - Please order any requested diagnostic studies on morning of procedure & notify procedure team if cytology requested.  - Primary team will have to physically deliver samples to lab; cannot be sent through pneumatic tube  - Can generally continue DVT/VTE PPx (*** Therapeutic AC?***) (Policy #: [id number])  - Pt does NOT need to be NPO  - Plan for procedure *** [date] ***  - If repeat therapeutic paracentesis requested for severe / refractory ascites in < 7 day intervals, should consider IR consultation for indwelling catheter placement.
Patient pending paracentesis, plan for EUS/ERCP tomorrow.  - please keep npo after MN  - 4 AM CBC, CMP     Piper Castañeda, PGY6  Gastroenterology/Hepatology Fellow  Available on Microsoft Teams  [phone] ([facility] [place] Short Range Pager)  [phone] (Long Range Pager)    After 5pm, please contact the on-call GI fellow. [phone]

## 2023-10-05 NOTE — PROGRESS NOTE ADULT - ASSESSMENT
58 year old male with history of AUD, remote DVT [not on AC], and decompensated alcohol-associated cirrhosis c/b ascites and alcohol-associated hepatitis [s/p prednisone July-August 2023 with subsequent taper] who presents with abdominal pain.  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  Follows with hepatologist Dr. Simental and just completed prednisone taper last week.  On 9/29/2023, he developed acute, intermittent, sharp RUQ pain, worsened with meals.  He went to [facility] ED that night, his symptoms improved and was sent home, however only came back to [facility] due to worsening abdominal pain.  Otherwise, patient denies fevers, chills, weight loss, dysphagia, odynophagia, nausea, vomiting, diarrhea, melena, hematemesis, hematochezia, change in stool caliber, or family history of GI-related cancers.    # Acute, sharp, intermittent RUQ pain  # Leukocytosis  # Hyperbilirubinemia above baseline  # Newly dilated CBD to 12mm  # History of AUD  # Decompensated alcohol-associated cirrhosis c/b ascites and alcohol-associated hepatitis [s/p prednisone July-August 2023 with subsequent taper]        -MELD-Na = 23 on 10/5/2023       -Ascites: on Lasix 40mg BID and Aldactone 100mg BID at home       -SBP: negative; most recent paracentesis 10/3/2023       -Varices: unknown       -Hepatic encephalopathy: Ammonia, Serum: 38 umol/L [11 - 55] (10-03-23 @ 08:06)  lactulose Syrup 15 Gram(s) Oral two times a day, 10-03-23 @ 14:36, Routine       -HCC: none on CT imaging    Recommendations:  -trend clinical symptoms, exam findings, vital signs, CBC, CMP, INR  -complete infectious workup and f/u BCx  -IVF and empiric antibiotics following culture collection  -per [place] criteria, patient does not meet criteria for emergent ERCP  -recommend MRI/MRCP given clinical scenario and imaging findings  -may require ERCP for cholangitis pending MRI findings and optimization of hyponatremia  -no indication for steroids as he completed his course of prednisone with taper  -will readdress for pathway to transplant after workup for cholangitis/choledocholithiasis, however in the meantime please send the workup for transplant evaluation    Transplant workup [fulminant liver failure order set]:  -ABO/Rh blood type sent on 2 different blood samples at least 15 minutes apart  -acetaminophen level x2 at least 4 hours apart  -check PETH level and serum/urine drug screening  -rule out viral hepatitis with HBV PCR, HSV PCR, VZV PCR, EBV PCR, CMV IgG & PCR  -check HAV IgM, HBsAg, HBsAb, HBcAb IgM, HCV Ab [with reflex HCV PCR if positive], and HEV Ab  -assess for autoimmune etiology with MARTA [antinuclear antibody], ASMA [anti smooth muscle antibody], anti-LKM [liver kidney microsomal antibody], anti-sLA [soluble liver antigen antibody], and quantitative immunoglobulins [IgG, IgA, IgM]  -check ceruloplasmin  -AFP  -HIV screening  -urine protein:creatinine ratio  -COVID spike antibody  -check quantiferon gold for TB screening  -strongyloides antibody  -toxoplasma IgG  -rubella IgG, syphillis screen, toxoplasma IgG, VZV IgG  -cardiac noninvasive stress test  -hypercoagulable work up         -Factor V Leiden         -Factor VIII        -Homocysteine        -Protein C        -Protein S        -Antithrombin III Assay with reflex        -Molecular genetics prothrombin        -Molecular genetics MTHFR gene         -Anticardiolipin antibody     Note incomplete until finalized by attending signature/attestation.    Curtis Almeida  GI/Hepatology Fellow    MONDAY-FRIDAY 8AM-5PM:  Pager# [phone] ([facility]) or [phone] ([facility])    NON-URGENT CONSULTS:  Please email [email or web address]  AT NIGHT AND ON WEEKENDS:  Contact on-call GI fellow via answering service ([phone]) from 5pm-8am and on weekends/holidays
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
## 2023-10-05 NOTE — PROGRESS NOTE ADULT - ATTENDING COMMENTS
58 yr old man PMHx EtOH abuse, decompensated cirrhosis c/b ascites and alc hep s/p course of prednisone (July-September), prior DVT, prior positive C.diff p/w diffuse abdominal pain. Found to have distended GB and CBD dilatation to 1.2cm and moderate ascites s/p diagnostic paracentesis negative for SBP.    Remains nontoxic appearing with soft but distended abd, nontender    # Dilated CBD to 1.2cm with elevated Tbili from baseline, r/o choledocholithiasis  # Decompensated cirrhosis with ascites    Ascitic cx and Bcx NGTD, on Zosyn per GI recs. Leukocytosis is resolved and has been afebrile, no S/S of infection  S/p therapeutic paracentesis 10/5 with 5260cc removed  Tentative plan by GI for EUS/ERCP on 10/6. Tbili downtrending  Continue lasix, aldactone, fluid restrict 1L, trend Na   Undergoing outpatient screening for liver transplant. Plan for EGD/Colon as outpatient on 10/19.   GI and hepatology following
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
## 2023-10-05 NOTE — PROGRESS NOTE ADULT - SUBJECTIVE AND OBJECTIVE BOX
Anthony Ana | PGY2| Pager: [phone]  Interval Events: No acute events overnight. Pt seen and examined at bedside.  Patient denies any complaints overnight. Overall states pain is improved.   Will attempt paracentesis plan for today, with ERCP likely tomorrow per GI      OBJECTIVE:  ICU Vital Signs Last 24 Hrs  T(C): 36.4 (05 Oct 2023 05:34), Max: 36.9 (04 Oct 2023 14:37)  T(F): 97.6 (05 Oct 2023 05:34), Max: 98.4 (04 Oct 2023 14:37)  HR: 77 (05 Oct 2023 05:34) (75 - 81)  BP: 108/63 (05 Oct 2023 05:34) (108/63 - 128/73)  BP(mean): --  ABP: --  ABP(mean): --  RR: 18 (05 Oct 2023 05:34) (17 - 18)  SpO2: 99% (05 Oct 2023 05:34) (98% - 100%)    O2 Parameters below as of 05 Oct 2023 05:34  Patient On (Oxygen Delivery Method): room air              10-04 @ 07:01  -  10-05 @ 07:00  --------------------------------------------------------  IN: 1160 mL / OUT: 0 mL / NET: 1160 mL      CAPILLARY BLOOD GLUCOSE          PHYSICAL EXAM:  General: NAD  Neurology: A&Ox3, nonfocal, PEARL x 4  Eyes: PERRLA/ EOMI, Gross vision intact, icteric sclera  ENT/Neck: Neck supple, trachea midline, No JVD, Gross hearing intact  Respiratory: CTA B/L, No wheezing, rales, rhonchi  CV: RRR, +S1/S2, -S3/S4, no murmurs, rubs or gallops  Abdominal: Soft, NT, Distended w/ +fluid wave, +BS, No HSM, +caput medusae, reducible umbilical hernia   MSK: 5/5 strength UE/LE bilaterally  Extremities: No edema, 2+ peripheral pulses  Skin: No Rashes, Hematoma, Ecchymosis  Incisions:   Tubes:    HOSPITAL MEDICATIONS:  MEDICATIONS  (STANDING):  furosemide    Tablet 40 milliGRAM(s) Oral daily  heparin   Injectable 5000 Unit(s) SubCutaneous every 12 hours  lactulose Syrup 15 Gram(s) Oral two times a day  multivitamin 1 Tablet(s) Oral daily  piperacillin/tazobactam IVPB.. 3.375 Gram(s) IV Intermittent every 8 hours  spironolactone 100 milliGRAM(s) Oral daily    MEDICATIONS  (PRN):      LABS:                        12.8   9.65  )-----------( 81       ( 05 Oct 2023 06:06 )             37.0     Hgb Trend: 12.8<--, 12.7<--, 13.1<--  10-05    128<L>  |  95<L>  |  17  ----------------------------<  117<H>  4.5   |  20<L>  |  1.01    Ca    8.8      05 Oct 2023 06:06  Phos  3.1     10-05  Mg     1.60     10-05    TPro  6.5  /  Alb  2.8<L>  /  TBili  4.3<H>  /  DBili  x   /  AST  62<H>  /  ALT  35  /  AlkPhos  238<H>  10-05    Creatinine Trend: 1.01<--, 0.98<--, 1.05<--  PT/INR - ( 05 Oct 2023 06:06 )   PT: 16.8 sec;   INR: 1.50 ratio         PTT - ( 05 Oct 2023 06:06 )  PTT:37.8 sec  Urinalysis Basic - ( 05 Oct 2023 06:06 )    Color: x / Appearance: x / SG: x / pH: x  Gluc: 117 mg/dL / Ketone: x  / Bili: x / Urobili: x   Blood: x / Protein: x / Nitrite: x   Leuk Esterase: x / RBC: x / WBC x   Sq Epi: x / Non Sq Epi: x / Bacteria: x            MICROBIOLOGY:     Culture - Urine (collected 03 Oct 2023 12:23)  Source: Clean Catch Clean Catch (Midstream)  Final Report (04 Oct 2023 18:01):    No growth    Culture - Blood (collected 03 Oct 2023 11:41)  Source: .Blood Blood-Peripheral  Preliminary Report (04 Oct 2023 15:08):    No growth at 24 hours    Culture - Blood (collected 03 Oct 2023 11:34)  Source: .Blood Blood-Peripheral  Preliminary Report (04 Oct 2023 15:08):    No growth at 24 hours    Culture - Body Fluid with Gram Stain (collected 03 Oct 2023 09:31)  Source: Ascites Fl Ascites Fluid  Gram Stain (03 Oct 2023 15:54):    polymorphonuclear leukocytes seen    No organisms seen    by cytocentrifuge  Preliminary Report (04 Oct 2023 12:00):    No growth to date.

## 2023-10-06 ENCOUNTER — APPOINTMENT (OUTPATIENT)
Dept: INTERVENTIONAL RADIOLOGY/VASCULAR | Facility: HOSPITAL | Age: 58
End: 2023-10-06

## 2023-10-06 ENCOUNTER — TRANSCRIPTION ENCOUNTER (OUTPATIENT)
Age: 58
End: 2023-10-06

## 2023-10-06 ENCOUNTER — NON-APPOINTMENT (OUTPATIENT)
Age: 58
End: 2023-10-06

## 2023-10-06 LAB
ALBUMIN SERPL ELPH-MCNC: 2.8 G/DL — LOW (ref 3.3–5)
ALBUMIN SERPL ELPH-MCNC: 3.3 G/DL — SIGNIFICANT CHANGE UP (ref 3.3–5)
ALP SERPL-CCNC: 225 U/L — HIGH (ref 40–120)
ALP SERPL-CCNC: 240 U/L — HIGH (ref 40–120)
ALT FLD-CCNC: 36 U/L — SIGNIFICANT CHANGE UP (ref 4–41)
ALT FLD-CCNC: 36 U/L — SIGNIFICANT CHANGE UP (ref 4–41)
AMPHET UR-MCNC: NEGATIVE — SIGNIFICANT CHANGE UP
ANION GAP SERPL CALC-SCNC: 15 MMOL/L — HIGH (ref 7–14)
ANION GAP SERPL CALC-SCNC: 9 MMOL/L — SIGNIFICANT CHANGE UP (ref 7–14)
APAP SERPL-MCNC: <10 UG/ML — LOW (ref 15–25)
APAP SERPL-MCNC: <10 UG/ML — LOW (ref 15–25)
APTT BLD: 39.8 SEC — HIGH (ref 24.5–35.6)
AST SERPL-CCNC: 69 U/L — HIGH (ref 4–40)
AST SERPL-CCNC: 83 U/L — HIGH (ref 4–40)
BARBITURATES UR SCN-MCNC: NEGATIVE — SIGNIFICANT CHANGE UP
BASOPHILS # BLD AUTO: 0.11 K/UL — SIGNIFICANT CHANGE UP (ref 0–0.2)
BASOPHILS NFR BLD AUTO: 1.1 % — SIGNIFICANT CHANGE UP (ref 0–2)
BENZODIAZ UR-MCNC: NEGATIVE — SIGNIFICANT CHANGE UP
BILIRUB SERPL-MCNC: 4.1 MG/DL — HIGH (ref 0.2–1.2)
BILIRUB SERPL-MCNC: 4.9 MG/DL — HIGH (ref 0.2–1.2)
BLD GP AB SCN SERPL QL: NEGATIVE — SIGNIFICANT CHANGE UP
BUN SERPL-MCNC: 17 MG/DL — SIGNIFICANT CHANGE UP (ref 7–23)
BUN SERPL-MCNC: 17 MG/DL — SIGNIFICANT CHANGE UP (ref 7–23)
CALCIUM SERPL-MCNC: 8.8 MG/DL — SIGNIFICANT CHANGE UP (ref 8.4–10.5)
CALCIUM SERPL-MCNC: 9.4 MG/DL — SIGNIFICANT CHANGE UP (ref 8.4–10.5)
CERULOPLASMIN SERPL-MCNC: 29 MG/DL — SIGNIFICANT CHANGE UP (ref 15–30)
CHLORIDE SERPL-SCNC: 91 MMOL/L — LOW (ref 98–107)
CHLORIDE SERPL-SCNC: 93 MMOL/L — LOW (ref 98–107)
CMV IGG FLD QL: 0.4 U/ML — SIGNIFICANT CHANGE UP
CMV IGG SERPL-IMP: NEGATIVE — SIGNIFICANT CHANGE UP
CO2 SERPL-SCNC: 19 MMOL/L — LOW (ref 22–31)
CO2 SERPL-SCNC: 26 MMOL/L — SIGNIFICANT CHANGE UP (ref 22–31)
COCAINE METAB.OTHER UR-MCNC: NEGATIVE — SIGNIFICANT CHANGE UP
COVID-19 SPIKE DOMAIN AB INTERP: POSITIVE
COVID-19 SPIKE DOMAIN ANTIBODY RESULT: >250 U/ML — HIGH
CREAT ?TM UR-MCNC: 27 MG/DL — SIGNIFICANT CHANGE UP
CREAT SERPL-MCNC: 1 MG/DL — SIGNIFICANT CHANGE UP (ref 0.5–1.3)
CREAT SERPL-MCNC: 1.07 MG/DL — SIGNIFICANT CHANGE UP (ref 0.5–1.3)
CREATININE URINE RESULT, DAU: 26 MG/DL — SIGNIFICANT CHANGE UP
EGFR: 81 ML/MIN/1.73M2 — SIGNIFICANT CHANGE UP
EGFR: 88 ML/MIN/1.73M2 — SIGNIFICANT CHANGE UP
EOSINOPHIL # BLD AUTO: 0.36 K/UL — SIGNIFICANT CHANGE UP (ref 0–0.5)
EOSINOPHIL NFR BLD AUTO: 3.5 % — SIGNIFICANT CHANGE UP (ref 0–6)
GLUCOSE SERPL-MCNC: 104 MG/DL — HIGH (ref 70–99)
GLUCOSE SERPL-MCNC: 166 MG/DL — HIGH (ref 70–99)
HAV IGM SER-ACNC: SIGNIFICANT CHANGE UP
HBV CORE IGM SER-ACNC: SIGNIFICANT CHANGE UP
HBV DNA # SERPL NAA+PROBE: SIGNIFICANT CHANGE UP
HBV DNA SERPL NAA+PROBE-LOG#: SIGNIFICANT CHANGE UP LOGIU/ML
HBV SURFACE AB SER-ACNC: REACTIVE
HBV SURFACE AG SER-ACNC: SIGNIFICANT CHANGE UP
HCT VFR BLD CALC: 37 % — LOW (ref 39–50)
HCV RNA FLD QL NAA+PROBE: SIGNIFICANT CHANGE UP
HCV RNA SPEC QL PROBE+SIG AMP: SIGNIFICANT CHANGE UP
HCYS SERPL-MCNC: 16.4 UMOL/L — HIGH
HGB BLD-MCNC: 13.3 G/DL — SIGNIFICANT CHANGE UP (ref 13–17)
HIV 1+2 AB+HIV1 P24 AG SERPL QL IA: SIGNIFICANT CHANGE UP
IANC: 7.29 K/UL — SIGNIFICANT CHANGE UP (ref 1.8–7.4)
IGA FLD-MCNC: 954 MG/DL — HIGH (ref 70–400)
IGG FLD-MCNC: 1704 MG/DL — HIGH (ref 700–1600)
IGM SERPL-MCNC: 170 MG/DL — SIGNIFICANT CHANGE UP (ref 40–230)
IMM GRANULOCYTES NFR BLD AUTO: 0.9 % — SIGNIFICANT CHANGE UP (ref 0–0.9)
INR BLD: 1.5 RATIO — HIGH (ref 0.85–1.18)
LYMPHOCYTES # BLD AUTO: 1.62 K/UL — SIGNIFICANT CHANGE UP (ref 1–3.3)
LYMPHOCYTES # BLD AUTO: 15.6 % — SIGNIFICANT CHANGE UP (ref 13–44)
MAGNESIUM SERPL-MCNC: 1.7 MG/DL — SIGNIFICANT CHANGE UP (ref 1.6–2.6)
MAGNESIUM SERPL-MCNC: 1.7 MG/DL — SIGNIFICANT CHANGE UP (ref 1.6–2.6)
MCHC RBC-ENTMCNC: 34.6 PG — HIGH (ref 27–34)
MCHC RBC-ENTMCNC: 35.9 GM/DL — SIGNIFICANT CHANGE UP (ref 32–36)
MCV RBC AUTO: 96.4 FL — SIGNIFICANT CHANGE UP (ref 80–100)
METHADONE UR-MCNC: NEGATIVE — SIGNIFICANT CHANGE UP
MONOCYTES # BLD AUTO: 0.92 K/UL — HIGH (ref 0–0.9)
MONOCYTES NFR BLD AUTO: 8.9 % — SIGNIFICANT CHANGE UP (ref 2–14)
NEUTROPHILS # BLD AUTO: 7.29 K/UL — SIGNIFICANT CHANGE UP (ref 1.8–7.4)
NEUTROPHILS NFR BLD AUTO: 70 % — SIGNIFICANT CHANGE UP (ref 43–77)
NRBC # BLD: 0 /100 WBCS — SIGNIFICANT CHANGE UP (ref 0–0)
NRBC # FLD: 0 K/UL — SIGNIFICANT CHANGE UP (ref 0–0)
OPIATES UR-MCNC: NEGATIVE — SIGNIFICANT CHANGE UP
OXYCODONE UR-MCNC: NEGATIVE — SIGNIFICANT CHANGE UP
PCP SPEC-MCNC: SIGNIFICANT CHANGE UP
PCP UR-MCNC: NEGATIVE — SIGNIFICANT CHANGE UP
PHOSPHATE SERPL-MCNC: 3.6 MG/DL — SIGNIFICANT CHANGE UP (ref 2.5–4.5)
PHOSPHATE SERPL-MCNC: 3.9 MG/DL — SIGNIFICANT CHANGE UP (ref 2.5–4.5)
PLATELET # BLD AUTO: 89 K/UL — LOW (ref 150–400)
POTASSIUM SERPL-MCNC: 4.7 MMOL/L — SIGNIFICANT CHANGE UP (ref 3.5–5.3)
POTASSIUM SERPL-MCNC: 5.2 MMOL/L — SIGNIFICANT CHANGE UP (ref 3.5–5.3)
POTASSIUM SERPL-SCNC: 4.7 MMOL/L — SIGNIFICANT CHANGE UP (ref 3.5–5.3)
POTASSIUM SERPL-SCNC: 5.2 MMOL/L — SIGNIFICANT CHANGE UP (ref 3.5–5.3)
PROT ?TM UR-MCNC: 4 MG/DL — SIGNIFICANT CHANGE UP
PROT SERPL-MCNC: 6.8 G/DL — SIGNIFICANT CHANGE UP (ref 6–8.3)
PROT SERPL-MCNC: 7.2 G/DL — SIGNIFICANT CHANGE UP (ref 6–8.3)
PROT/CREAT UR-RTO: 0.2 RATIO — SIGNIFICANT CHANGE UP (ref 0–0.2)
PROTHROM AB SERPL-ACNC: 16.6 SEC — HIGH (ref 9.5–13)
RBC # BLD: 3.84 M/UL — LOW (ref 4.2–5.8)
RBC # FLD: 14.9 % — HIGH (ref 10.3–14.5)
RH IG SCN BLD-IMP: POSITIVE — SIGNIFICANT CHANGE UP
RUBV IGG SER-ACNC: 23.5 INDEX — SIGNIFICANT CHANGE UP
RUBV IGG SER-IMP: POSITIVE — SIGNIFICANT CHANGE UP
SARS-COV-2 IGG+IGM SERPL QL IA: >250 U/ML — HIGH
SARS-COV-2 IGG+IGM SERPL QL IA: POSITIVE
SODIUM SERPL-SCNC: 125 MMOL/L — LOW (ref 135–145)
SODIUM SERPL-SCNC: 128 MMOL/L — LOW (ref 135–145)
T GONDII IGG SER QL: <3 IU/ML — SIGNIFICANT CHANGE UP
T GONDII IGG SER QL: NEGATIVE — SIGNIFICANT CHANGE UP
T PALLIDUM AB TITR SER: NEGATIVE — SIGNIFICANT CHANGE UP
THC UR QL: NEGATIVE — SIGNIFICANT CHANGE UP
VZV IGG FLD QL IA: 1003 INDEX — SIGNIFICANT CHANGE UP
VZV IGG FLD QL IA: POSITIVE — SIGNIFICANT CHANGE UP
WBC # BLD: 10.39 K/UL — SIGNIFICANT CHANGE UP (ref 3.8–10.5)
WBC # FLD AUTO: 10.39 K/UL — SIGNIFICANT CHANGE UP (ref 3.8–10.5)

## 2023-10-06 PROCEDURE — 99231 SBSQ HOSP IP/OBS SF/LOW 25: CPT | Mod: GC

## 2023-10-06 PROCEDURE — 43237 ENDOSCOPIC US EXAM ESOPH: CPT | Mod: GC

## 2023-10-06 PROCEDURE — 99233 SBSQ HOSP IP/OBS HIGH 50: CPT | Mod: GC

## 2023-10-06 PROCEDURE — 43239 EGD BIOPSY SINGLE/MULTIPLE: CPT | Mod: GC

## 2023-10-06 PROCEDURE — G0452: CPT | Mod: 26,59

## 2023-10-06 PROCEDURE — G0452: CPT | Mod: 26

## 2023-10-06 PROCEDURE — 88305 TISSUE EXAM BY PATHOLOGIST: CPT | Mod: 26

## 2023-10-06 RX ORDER — ALBUMIN HUMAN 25 %
100 VIAL (ML) INTRAVENOUS ONCE
Refills: 0 | Status: COMPLETED | OUTPATIENT
Start: 2023-10-06 | End: 2023-10-06

## 2023-10-06 RX ADMIN — Medication 50 MILLILITER(S): at 10:07

## 2023-10-06 RX ADMIN — Medication 40 MILLIGRAM(S): at 05:50

## 2023-10-06 RX ADMIN — SPIRONOLACTONE 100 MILLIGRAM(S): 25 TABLET, FILM COATED ORAL at 05:50

## 2023-10-06 RX ADMIN — PIPERACILLIN AND TAZOBACTAM 25 GRAM(S): 4; .5 INJECTION, POWDER, LYOPHILIZED, FOR SOLUTION INTRAVENOUS at 05:51

## 2023-10-06 RX ADMIN — LACTULOSE 15 GRAM(S): 10 SOLUTION ORAL at 05:52

## 2023-10-06 RX ADMIN — LACTULOSE 15 GRAM(S): 10 SOLUTION ORAL at 17:38

## 2023-10-06 NOTE — PRE PROCEDURE NOTE - PRE PROCEDURE EVALUATION
Attending Physician:   Dr. Marcelino    Procedure: EUS +/- ERCP    Indication for Procedure: Abnormal imaging, Evaluate for biliary obstruction.    ________________________________________________________  PAST MEDICAL & SURGICAL HISTORY:  ETOH abuse      S/P left knee surgery        ALLERGIES:  cortisone (Swelling; Rash)    HOME MEDICATIONS:  lactulose 10 g/15 mL oral syrup: 15 milliliter(s) orally 2 times a day  Multiple Vitamins oral tablet: 1 tab(s) orally once a day  spironolactone 100 mg oral tablet: 1 tab(s) orally 2 times a day    AICD/PPM: [ ] yes   [x ] no    PERTINENT LAB DATA:                        13.3   10.39 )-----------( 89       ( 06 Oct 2023 05:20 )             37.0     10-06    125<L>  |  91<L>  |  17  ----------------------------<  104<H>  5.2   |  19<L>  |  1.00    Ca    8.8      06 Oct 2023 05:20  Phos  3.6     10-06  Mg     1.70     10-06    TPro  6.8  /  Alb  2.8<L>  /  TBili  4.1<H>  /  DBili  x   /  AST  83<H>  /  ALT  36  /  AlkPhos  240<H>  10-06    PT/INR - ( 06 Oct 2023 05:20 )   PT: 16.6 sec;   INR: 1.50 ratio         PTT - ( 06 Oct 2023 05:20 )  PTT:39.8 sec            PHYSICAL EXAMINATION:    T(C): 36.7  HR: 72  BP: 115/80  RR: 17  SpO2: 99%    Constitutional: NAD  HEENT: PERRLA, EOMI,    Neck:  No JVD  Respiratory: CTAB/L  Cardiovascular: S1 and S2  Gastrointestinal: BS+, soft  Extremities: No peripheral edema  Neurological: A/O x 3, no focal deficits  Psychiatric: Normal mood, normal affect  Skin: No rashes    ASA Class: I [ ]  II [ ]  III [ x]  IV [ ]    COMMENTS:    The patient is a suitable candidate for the planned procedure unless box checked [ ]  No, explain:    I explained the risks (bleeding, infection, perforation, pancreatitis, CV risk, anesthesia risk)/b/a with the patient. The patient agrees to proceed. All questions answered in preprocedure bay 12.

## 2023-10-06 NOTE — DISCHARGE NOTE PROVIDER - NSDCFUSCHEDAPPT_GEN_ALL_CORE_FT
Yohan Rodriguez  [facility]  HEARTVASC [address]  Scheduled Appointment: 10/17/2023    [facility]  TRANSPLANT [address]   Scheduled Appointment: 10/18/2023    Dagher, Nabil  [facility]  TRANSPLANT [address] [facility]   Scheduled Appointment: 10/18/2023    [facility]  TRANSPLANT [address] [facility]   Scheduled Appointment: 10/18/2023    Mynor Simental  [facility]  HEPATOLOGY [address]  Scheduled Appointment: 10/18/2023    Mynor Simental  [facility]  HEPATOLOGY [address]   Scheduled Appointment: 10/19/2023     Yohan Rodriguez  [facility]  HEARTVASC [address]  Scheduled Appointment: 10/17/2023    [facility]  TRANSPLANT [address]   Scheduled Appointment: 10/18/2023    Mynor Simental  [facility]  HEPATOLOGY [address]  Scheduled Appointment: 10/18/2023    Dagher, Nabil  [facility]  TRANSPLANT [address]   Scheduled Appointment: 10/18/2023    [facility]  TRANSPLANT [address] [facility]   Scheduled Appointment: 10/18/2023    Mynor Simental  [facility]  HEPATOLOGY [place]   Scheduled Appointment: 10/19/2023

## 2023-10-06 NOTE — DISCHARGE NOTE PROVIDER - PROVIDER TOKENS
FREE:[LAST:[PCP],PHONE:[(   )    -],FAX:[(   )    -],ADDRESS:[Please follow up with your primary care provider within 1 week of discharge from hospital]]

## 2023-10-06 NOTE — PRE-OP CHECKLIST - LAST TOOK
clears
one admission at [facility] October 2018 for behavioral outburst, who prescribed Risperdal and Cogentin. Patient in special education. Mother reported patient has several ED visits for similar outbursts at school.

## 2023-10-06 NOTE — PROGRESS NOTE ADULT - PROBLEM SELECTOR PLAN 3
patient with sodium of 127 today with 127 on admission ISO liver failure  - furosemide 40 qd, spironolactone 100 qd while hospitalized   - urine osm/urine sodium  - fluid restricted to 1000cc patient with sodium of 125 today with 127 on admission ISO liver failure  - albumin repletion 10/6  - furosemide 40 qd, spironolactone 100 qd while hospitalized   - urine osm/urine sodium  - fluid restricted to 1000cc
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
## 2023-10-06 NOTE — PROGRESS NOTE ADULT - SUBJECTIVE AND OBJECTIVE BOX
Interval Events:   No acute events overnight.  Patient without acute symptoms at this time.  No acute findings on endoscopy.    ROS:   12 point review of systems performed and negative except otherwise noted in HPI.    Hospital Medications:  furosemide    Tablet 40 milliGRAM(s) Oral daily  lactulose Syrup 15 Gram(s) Oral two times a day  multivitamin 1 Tablet(s) Oral daily  spironolactone 100 milliGRAM(s) Oral daily      PHYSICAL EXAM:   Vital Signs:  Vital Signs Last 24 Hrs  T(C): 36.3 (06 Oct 2023 14:43), Max: 37.2 (05 Oct 2023 21:36)  T(F): 97.3 (06 Oct 2023 14:43), Max: 98.9 (05 Oct 2023 21:36)  HR: 71 (06 Oct 2023 14:43) (70 - 81)  BP: 118/66 (06 Oct 2023 14:43) (101/60 - 122/60)  BP(mean): --  RR: 17 (06 Oct 2023 14:43) (12 - 20)  SpO2: 100% (06 Oct 2023 14:43) (96% - 100%)    Parameters below as of 06 Oct 2023 14:43  Patient On (Oxygen Delivery Method): room air      Daily Height in cm: 172.7 (06 Oct 2023 10:55)    Daily     GENERAL: no acute distress  NEURO: alert  HEENT: NCAT, no conjunctival pallor appreciated  CHEST: no respiratory distress, no accessory muscle use  CARDIAC: regular rate, +S1/S2  ABDOMEN: soft, mildly distended, no rebound or guarding  EXTREMITIES: warm, well perfused  SKIN: no lesions noted    LABS: reviewed                        13.3   10.39 )-----------( 89       ( 06 Oct 2023 05:20 )             37.0     10-06    128<L>  |  93<L>  |  17  ----------------------------<  166<H>  4.7   |  26  |  1.07    Ca    9.4      06 Oct 2023 15:22  Phos  3.9     10-06  Mg     1.70     10-06    TPro  7.2  /  Alb  3.3  /  TBili  4.9<H>  /  DBili  x   /  AST  69<H>  /  ALT  36  /  AlkPhos  225<H>  10-06    LIVER FUNCTIONS - ( 06 Oct 2023 15:22 )  Alb: 3.3 g/dL / Pro: 7.2 g/dL / ALK PHOS: 225 U/L / ALT: 36 U/L / AST: 69 U/L / GGT: x             Interval Diagnostic Studies: see sunrise for full report

## 2023-10-06 NOTE — DISCHARGE NOTE PROVIDER - NSDCMRMEDTOKEN_GEN_ALL_CORE_FT
furosemide 40 mg oral tablet: 1 tab(s) orally every 12 hours  lactulose 10 g/15 mL oral syrup: 15 milliliter(s) orally 2 times a day  Multiple Vitamins oral tablet: 1 tab(s) orally once a day  spironolactone 100 mg oral tablet: 1 tab(s) orally 2 times a day   Aldactone 25 mg oral tablet: 4 tab(s) orally 2 times a day  furosemide 40 mg oral tablet: 1 tab(s) orally every 12 hours  lactulose 10 g/15 mL oral syrup: 15 milliliter(s) orally 2 times a day  Multiple Vitamins oral tablet: 1 tab(s) orally once a day   Multiple Vitamins oral tablet: 1 tab(s) orally once a day

## 2023-10-06 NOTE — DISCHARGE NOTE PROVIDER - NSDCCPCAREPLAN_GEN_ALL_CORE_FT
PRINCIPAL DISCHARGE DIAGNOSIS  Diagnosis: Ascites  Assessment and Plan of Treatment: you were cared for during your hospitalization for abdominal pain caused by fluid build up in your abdomen. We treated you with a thereapeutic paracentesis which relieved the abdominal tension and removed fluid and provided significant pain relief.     PRINCIPAL DISCHARGE DIAGNOSIS  Diagnosis: Ascites  Assessment and Plan of Treatment: you were cared for during your hospitalization for abdominal pain caused by fluid build up in your abdomen. We treated you with a thereapeutic paracentesis which relieved the abdominal tension and removed fluid and provided significant pain relief. We did an ERCP test to assess if you had a stone blockage causing dilation of your common bile duct. Ercp did not show evidence of that. We initiated workup for liver transplant. Please return to the hospital if you experience fever, chills, severe headache, dizziness, lightheadedness, loss of consciousness, visual disturbance, chest pain, palpitations, shortness of breath,  abdominal pain, nausea, vomiting, diarrhea, blood in your vomit/stool/urine, burning with urination or change in urinary pattern, numbness, weakness, tingling, or other alarming symptoms.       PRINCIPAL DISCHARGE DIAGNOSIS  Diagnosis: Ascites  Assessment and Plan of Treatment: you were cared for during your hospitalization for abdominal pain caused by fluid build up in your abdomen. We treated you with a thereapeutic paracentesis which relieved the abdominal tension and removed fluid and provided significant pain relief. We did an ERCP test to assess if you had a stone blockage causing dilation of your common bile duct. Ercp did not show evidence of that. We initiated workup for liver transplant. Please return to the hospital if you experience fever, chills, severe headache, dizziness, lightheadedness, loss of consciousness, visual disturbance, chest pain, palpitations, shortness of breath,  abdominal pain, nausea, vomiting, diarrhea, blood in your vomit/stool/urine, burning with urination or change in urinary pattern, numbness, weakness, tingling, or other alarming symptoms.        SECONDARY DISCHARGE DIAGNOSES  Diagnosis: Cirrhosis  Assessment and Plan of Treatment: You were admitted to the hospital due to abdominal pain. This was likely becasue you buiilt up fluid in your abdomen due to your liver not properly functioning. You were seen by the liver doctors in the hospitla, and they started the initial evaluation for a liver transplant. Please make sure to follow-up with them after you get discharged.

## 2023-10-06 NOTE — PROGRESS NOTE ADULT - ATTENDING COMMENTS
58 yr old man PMHx EtOH abuse, decompensated cirrhosis c/b ascites and alc hep s/p course of prednisone (July-September), prior DVT, prior positive C.diff p/w diffuse abdominal pain. Found to have distended GB and CBD dilatation to 1.2cm and moderate ascites s/p diagnostic paracentesis negative for SBP and therapeutic paracentesis 10/5 with 5260cc removed.    Abd less distended, soft, NT    # Dilated CBD to 1.2cm with elevated Tbili from baseline, r/o choledocholithiasis  # Decompensated cirrhosis with ascites  # Hyponatremia    NPO for EUS/ERCP 10/6  Hyponatremia noted to 125 in setting of cirrhosis, on lasix and aldactone. Albumin 25gm ordered  Ascitic cx and Bcx NGTD, on Zosyn per GI recs. No S/S of infection  Undergoing screening for liver transplant. Plan for EGD/Colon as outpatient on 10/19.   GI and hepatology following 58 yr old man PMHx EtOH abuse, decompensated cirrhosis c/b ascites and alc hep s/p course of prednisone (July-September), prior DVT, prior positive C.diff p/w diffuse abdominal pain. Found to have distended GB and CBD dilatation to 1.2cm and moderate ascites s/p diagnostic paracentesis negative for SBP and therapeutic paracentesis 10/5 with 5260cc removed.    Abd less distended, soft, NT    # Dilated CBD to 1.2cm with elevated Tbili from baseline, r/o choledocholithiasis  # Decompensated cirrhosis with ascites  # Hyponatremia    NPO for EUS/ERCP 10/6  Hyponatremia noted to 125 in setting of cirrhosis, on lasix and aldactone. Albumin 25gm ordered  Ascitic cx and Bcx NGTD, on Zosyn per GI recs. No S/S of infection  Undergoing screening for liver transplant. Plan for EGD/Colon as outpatient on 10/19.   GI and hepatology following    Update: Discussed with Dr. Perez: no choledocholithiasis on EUS and agreeable to stop Abx.

## 2023-10-06 NOTE — PROGRESS NOTE ADULT - ASSESSMENT
58 year old male with history of AUD, remote DVT [not on AC], and decompensated alcohol-associated cirrhosis c/b ascites and alcohol-associated hepatitis [s/p prednisone July-August 2023 with subsequent taper] who presents with abdominal pain.  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  Follows with hepatologist Dr. Simental and just completed prednisone taper last week.  On 9/29/2023, he developed acute, intermittent, sharp RUQ pain, worsened with meals.  He went to [facility] ED that night, his symptoms improved and was sent home, however only came back to [facility] due to worsening abdominal pain.  Otherwise, patient denies fevers, chills, weight loss, dysphagia, odynophagia, nausea, vomiting, diarrhea, melena, hematemesis, hematochezia, change in stool caliber, or family history of GI-related cancers.    # Acute, sharp, intermittent RUQ pain  # Leukocytosis  # Hyperbilirubinemia above baseline  # Newly dilated CBD to 12mm  # History of AUD  # Decompensated alcohol-associated cirrhosis c/b ascites and alcohol-associated hepatitis [s/p prednisone July-August 2023 with subsequent taper]        -MELD-Na = 23 on 10/5/2023       -Ascites: on Lasix 40mg BID and Aldactone 100mg BID at home       -SBP: negative; most recent paracentesis 10/3/2023       -Varices: unknown       -Hepatic encephalopathy: Ammonia, Serum: 38 umol/L [11 - 55] (10-03-23 @ 08:06)  lactulose Syrup 15 Gram(s) Oral two times a day, 10-03-23 @ 14:36, Routine       -HCC: none on CT imaging    Recommendations:  -trend clinical symptoms, exam findings, vital signs, CBC, CMP, INR  -EUS findings unremarkable  -no indication for steroids as he completed his course of prednisone with taper  -will readdress for pathway to transplant after serologies and transplant workup result    Note incomplete until finalized by attending signature/attestation.    Curtis Almeida  GI/Hepatology Fellow    MONDAY-FRIDAY 8AM-5PM:  Pager# [phone] ([facility]) or [phone] ([facility])    NON-URGENT CONSULTS:  Please email [email or web address]  AT NIGHT AND ON WEEKENDS:  Contact on-call GI fellow via answering service ([phone]) from 5pm-8am and on weekends/holidays
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
## 2023-10-06 NOTE — DISCHARGE NOTE PROVIDER - NSDCCPTREATMENT_GEN_ALL_CORE_FT
PRINCIPAL PROCEDURE  Procedure: ERCP  Findings and Treatment:      PRINCIPAL PROCEDURE  Procedure: ERCP  Findings and Treatment: - There was dilation in the common bile duct which                        measured up to 12 mm, no evidence of stone or mass.                       - Pancreatic parenchymal abnormalities consisting of                        lobularity were noted in the entire pancreas.                       - A few benign lymph nodes were visualized in the                        gastrohepatic region, peripancreatic region and jonna                        hepatis region.                       - Portal hypertensive gastropathy. Biopsied.                       - Duodenal erosions without bleeding.     PRINCIPAL PROCEDURE  Procedure: ERCP  Findings and Treatment: - There was dilation in the common bile duct which                        measured up to 12 mm, no evidence of stone or mass.                       - Pancreatic parenchymal abnormalities consisting of                        lobularity were noted in the entire pancreas.                       - A few benign lymph nodes were visualized in the                        gastrohepatic region, peripancreatic region and jonna                        hepatis region.                       - Portal hypertensive gastropathy. Biopsied.                       - Duodenal erosions without bleeding.      SECONDARY PROCEDURE  Procedure: CT abdomen pelvis  Findings and Treatment: FINDINGS:  LOWER CHEST: Linear atelectasis at the right lung base. Left basilar   atelectasis.  LIVER: Cirrhosis.  BILE DUCTS: CBD dilation to 1.2 cm number new since 7/21/2023.  GALLBLADDER: Distended gallbladder with small layering sludge.  SPLEEN: Splenomegaly.  PANCREAS: Within normal limits.  ADRENALS: Within normal limits.  KIDNEYS/URETERS: Within normal limits.  BLADDER: Within normal limits.  REPRODUCTIVE ORGANS: Prostate within normal limits.  BOWEL: No bowel obstruction. Appendix is normal. Diverticulosis without   evidence of diverticulitis.  PERITONEUM: Moderate ascites.  VESSELS: Paraumbilical, paraesophageal, gastrohepatic and perisplenic   collaterals. Small splenorenal shunt.  RETROPERITONEUM/LYMPH NODES: No lymphadenopathy.  ABDOMINAL WALL: Fat-containing left inguinal hernia. Ascites filled   umbilical hernia..  BONES: Within normal limits.  IMPRESSION:  Cirrhosis and portal hypertension. Moderate ascites.  Distended gallbladder with new dilatation of the CBD. Correlate with LFTs.    Procedure: Dobutamine stress echocardiography  Findings and Treatment: CONCLUSIONS   1. Dobutamine stress echo is nondiagnostic. The BP dropped at 30mcg of Dobutamine infusion to 85/34. The following Bp was 77/34. This occured at 74% Max predicted HR. There was no ischemia on ECG or ECHO however given that the MPHR was <80% this result is nondiagnostic. The patient was asymptomatic throughout study. The BP normalized minutes after Dobutamine was discontinued. Additionally, 250cc NS was given.   2. The echocardiogram is non-diagnostic due to inadequate heart rate and systolic BP product achieved however, no ischemic changes seen at 74 % of maximum heart rate achieved.   3. No significant induced arrhythmias.

## 2023-10-06 NOTE — PROGRESS NOTE ADULT - PROBLEM SELECTOR PLAN 4
Activity: No restriction  Bowel reg: Lactulose  Consultants: GI, IPT  Diet: Regular, NPO at midnight 10/5  DVT ppx: HSQ  Dispo: Home  Directive: Full code  Electrolytes:  Family:  Gtts:   Hardware: pIV Activity: No restriction  Bowel reg: Lactulose  Consultants: GI, IPT  Diet: Regular,   DVT ppx: HSQ  Dispo: Home  Directive: Full code  Electrolytes:  Family:  Gtts:   Hardware: pIV

## 2023-10-06 NOTE — DISCHARGE NOTE PROVIDER - CARE PROVIDER_API CALL
PCP,   Please follow up with your primary care provider within 1 week of discharge from hospital  Phone: (   )    -  Fax: (   )    -  Follow Up Time:

## 2023-10-06 NOTE — DISCHARGE NOTE PROVIDER - ATTENDING DISCHARGE PHYSICAL EXAMINATION:
Physical Exam:  GENERAL: no apparent distress; AOx3  CHEST/LUNG: on RA; Clear to auscultation bilaterally; No wheezing; No crackles  HEART: Regular rate and rhythm; S1/S2 wnl; no obvious murmurs  ABDOMEN: Soft, Nontender, distended; umbilical hernia, reducible.   EXTREMITIES:  2+ Peripheral Pulses, No peripheral edema

## 2023-10-06 NOTE — PROGRESS NOTE ADULT - ASSESSMENT
58M pmhx EtOH abuse, decompensated cirrhosis requiring numerous therapeutic paracentesis (last 9/26), prior remote hx of DVT not on AC, prior positive C.diff presenting with 10/10 abdominal pain. Found to have dilated CBD pending further workup

## 2023-10-07 LAB
ALBUMIN SERPL ELPH-MCNC: 3.1 G/DL — LOW (ref 3.3–5)
ALP SERPL-CCNC: 249 U/L — HIGH (ref 40–120)
ALT FLD-CCNC: 32 U/L — SIGNIFICANT CHANGE UP (ref 4–41)
ANION GAP SERPL CALC-SCNC: 12 MMOL/L — SIGNIFICANT CHANGE UP (ref 7–14)
APTT BLD: 41.9 SEC — HIGH (ref 24.5–35.6)
AST SERPL-CCNC: 59 U/L — HIGH (ref 4–40)
BASOPHILS # BLD AUTO: 0.1 K/UL — SIGNIFICANT CHANGE UP (ref 0–0.2)
BASOPHILS NFR BLD AUTO: 0.9 % — SIGNIFICANT CHANGE UP (ref 0–2)
BILIRUB SERPL-MCNC: 3.6 MG/DL — HIGH (ref 0.2–1.2)
BUN SERPL-MCNC: 20 MG/DL — SIGNIFICANT CHANGE UP (ref 7–23)
CALCIUM SERPL-MCNC: 8.8 MG/DL — SIGNIFICANT CHANGE UP (ref 8.4–10.5)
CARDIOLIPIN AB SER-ACNC: POSITIVE
CHLORIDE SERPL-SCNC: 93 MMOL/L — LOW (ref 98–107)
CO2 SERPL-SCNC: 22 MMOL/L — SIGNIFICANT CHANGE UP (ref 22–31)
CREAT SERPL-MCNC: 1 MG/DL — SIGNIFICANT CHANGE UP (ref 0.5–1.3)
EBV DNA SERPL NAA+PROBE-ACNC: SIGNIFICANT CHANGE UP IU/ML
EBVPCR LOG: SIGNIFICANT CHANGE UP LOG10IU/ML
EGFR: 88 ML/MIN/1.73M2 — SIGNIFICANT CHANGE UP
EOSINOPHIL # BLD AUTO: 0.29 K/UL — SIGNIFICANT CHANGE UP (ref 0–0.5)
EOSINOPHIL NFR BLD AUTO: 2.6 % — SIGNIFICANT CHANGE UP (ref 0–6)
GLUCOSE SERPL-MCNC: 116 MG/DL — HIGH (ref 70–99)
HCT VFR BLD CALC: 36.8 % — LOW (ref 39–50)
HGB BLD-MCNC: 12.9 G/DL — LOW (ref 13–17)
IANC: 7.93 K/UL — HIGH (ref 1.8–7.4)
IMM GRANULOCYTES NFR BLD AUTO: 0.7 % — SIGNIFICANT CHANGE UP (ref 0–0.9)
INR BLD: 1.48 RATIO — HIGH (ref 0.85–1.18)
LYMPHOCYTES # BLD AUTO: 1.79 K/UL — SIGNIFICANT CHANGE UP (ref 1–3.3)
LYMPHOCYTES # BLD AUTO: 16.1 % — SIGNIFICANT CHANGE UP (ref 13–44)
MAGNESIUM SERPL-MCNC: 1.6 MG/DL — SIGNIFICANT CHANGE UP (ref 1.6–2.6)
MCHC RBC-ENTMCNC: 34.1 PG — HIGH (ref 27–34)
MCHC RBC-ENTMCNC: 35.1 GM/DL — SIGNIFICANT CHANGE UP (ref 32–36)
MCV RBC AUTO: 97.4 FL — SIGNIFICANT CHANGE UP (ref 80–100)
MONOCYTES # BLD AUTO: 0.93 K/UL — HIGH (ref 0–0.9)
MONOCYTES NFR BLD AUTO: 8.4 % — SIGNIFICANT CHANGE UP (ref 2–14)
NEUTROPHILS # BLD AUTO: 7.93 K/UL — HIGH (ref 1.8–7.4)
NEUTROPHILS NFR BLD AUTO: 71.3 % — SIGNIFICANT CHANGE UP (ref 43–77)
NRBC # BLD: 0 /100 WBCS — SIGNIFICANT CHANGE UP (ref 0–0)
NRBC # FLD: 0 K/UL — SIGNIFICANT CHANGE UP (ref 0–0)
PHOSPHATE SERPL-MCNC: 3.1 MG/DL — SIGNIFICANT CHANGE UP (ref 2.5–4.5)
PLATELET # BLD AUTO: 94 K/UL — LOW (ref 150–400)
POTASSIUM SERPL-MCNC: 4.5 MMOL/L — SIGNIFICANT CHANGE UP (ref 3.5–5.3)
POTASSIUM SERPL-SCNC: 4.5 MMOL/L — SIGNIFICANT CHANGE UP (ref 3.5–5.3)
PROT SERPL-MCNC: 6.5 G/DL — SIGNIFICANT CHANGE UP (ref 6–8.3)
PROTHROM AB SERPL-ACNC: 16.5 SEC — HIGH (ref 9.5–13)
RBC # BLD: 3.78 M/UL — LOW (ref 4.2–5.8)
RBC # FLD: 15.1 % — HIGH (ref 10.3–14.5)
SODIUM SERPL-SCNC: 127 MMOL/L — LOW (ref 135–145)
WBC # BLD: 11.12 K/UL — HIGH (ref 3.8–10.5)
WBC # FLD AUTO: 11.12 K/UL — HIGH (ref 3.8–10.5)

## 2023-10-07 PROCEDURE — 99233 SBSQ HOSP IP/OBS HIGH 50: CPT | Mod: GC

## 2023-10-07 PROCEDURE — 99232 SBSQ HOSP IP/OBS MODERATE 35: CPT | Mod: GC

## 2023-10-07 RX ORDER — SPIRONOLACTONE 25 MG/1
100 TABLET, FILM COATED ORAL
Refills: 0 | Status: DISCONTINUED | OUTPATIENT
Start: 2023-10-07 | End: 2023-10-10

## 2023-10-07 RX ORDER — FUROSEMIDE 40 MG
40 TABLET ORAL
Refills: 0 | Status: DISCONTINUED | OUTPATIENT
Start: 2023-10-07 | End: 2023-10-10

## 2023-10-07 RX ADMIN — Medication 40 MILLIGRAM(S): at 05:33

## 2023-10-07 RX ADMIN — SPIRONOLACTONE 100 MILLIGRAM(S): 25 TABLET, FILM COATED ORAL at 05:32

## 2023-10-07 RX ADMIN — LACTULOSE 15 GRAM(S): 10 SOLUTION ORAL at 05:33

## 2023-10-07 RX ADMIN — LACTULOSE 15 GRAM(S): 10 SOLUTION ORAL at 19:39

## 2023-10-07 RX ADMIN — SPIRONOLACTONE 100 MILLIGRAM(S): 25 TABLET, FILM COATED ORAL at 19:38

## 2023-10-07 RX ADMIN — Medication 1 TABLET(S): at 13:24

## 2023-10-07 RX ADMIN — Medication 40 MILLIGRAM(S): at 13:35

## 2023-10-07 NOTE — PROGRESS NOTE ADULT - SUBJECTIVE AND OBJECTIVE BOX
Interval Events:   s/p EUS with CBD dilation however no mass or stone identified. PHG s/p biopsy.   Pt feeling well post-procedure this AM without abdominal pain, N or V  Afebrile, VSS, labs stable    Hospital Medications:  furosemide    Tablet 40 milliGRAM(s) Oral two times a day  lactulose Syrup 15 Gram(s) Oral two times a day  multivitamin 1 Tablet(s) Oral daily  spironolactone 100 milliGRAM(s) Oral two times a day      PHYSICAL EXAM:   Vital Signs:  Vital Signs Last 24 Hrs  T(C): 36.8 (07 Oct 2023 05:58), Max: 36.8 (07 Oct 2023 05:58)  T(F): 98.3 (07 Oct 2023 05:58), Max: 98.3 (07 Oct 2023 05:58)  HR: 80 (07 Oct 2023 05:58) (71 - 80)  BP: 117/60 (07 Oct 2023 05:58) (117/60 - 126/65)  BP(mean): --  RR: 18 (07 Oct 2023 05:58) (17 - 18)  SpO2: 94% (07 Oct 2023 05:58) (94% - 100%)    Parameters below as of 07 Oct 2023 05:58  Patient On (Oxygen Delivery Method): room air      Daily     GENERAL:  NAD, resting comfortably in bed  CHEST:  Normal Effort  HEART:  HDS  ABDOMEN:  Soft, non-tender, non-distended  NEURO:  Alert, oriented, no focal deficit    LABS:                        12.9   11.12 )-----------( 94       ( 07 Oct 2023 07:12 )             36.8     Mean Cell Volume: 97.4 fL (10-07-23 @ 07:12)    10-07    127<L>  |  93<L>  |  20  ----------------------------<  116<H>  4.5   |  22  |  1.00    Ca    8.8      07 Oct 2023 07:12  Phos  3.1     10-07  Mg     1.60     10-07    TPro  6.5  /  Alb  3.1<L>  /  TBili  3.6<H>  /  DBili  x   /  AST  59<H>  /  ALT  32  /  AlkPhos  249<H>  10-07    LIVER FUNCTIONS - ( 07 Oct 2023 07:12 )  Alb: 3.1 g/dL / Pro: 6.5 g/dL / ALK PHOS: 249 U/L / ALT: 32 U/L / AST: 59 U/L / GGT: x           PT/INR - ( 07 Oct 2023 07:12 )   PT: 16.5 sec;   INR: 1.48 ratio         PTT - ( 07 Oct 2023 07:12 )  PTT:41.9 sec  Urinalysis Basic - ( 07 Oct 2023 07:12 )    Color: x / Appearance: x / SG: x / pH: x  Gluc: 116 mg/dL / Ketone: x  / Bili: x / Urobili: x   Blood: x / Protein: x / Nitrite: x   Leuk Esterase: x / RBC: x / WBC x   Sq Epi: x / Non Sq Epi: x / Bacteria: x                              12.9   11.12 )-----------( 94       ( 07 Oct 2023 07:12 )             36.8                         13.3   10.39 )-----------( 89       ( 06 Oct 2023 05:20 )             37.0                         12.8   9.65  )-----------( 81       ( 05 Oct 2023 06:06 )             37.0       Imaging: Images reviewed.    < from: Upper EUS (10.06.23 @ 11:55) >  Impression:          - There was dilation in the common bile duct which                       measured up to 12 mm, no evidence of stone or mass.                       - Pancreatic parenchymal abnormalities consisting of                        lobularity were noted in the entire pancreas.                       - A few benign lymph nodes were visualized in the                        gastrohepatic region, peripancreatic region and jonna                        hepatis region.                       - Portal hypertensive gastropathy. Biopsied.                       - Duodenal erosions without bleeding.    < end of copied text >

## 2023-10-07 NOTE — PROGRESS NOTE ADULT - PROBLEM SELECTOR PLAN 1
meets SIRS criteria for tachypnea RR>20 and elevated WBC upon admission now resolved.   Bili may be at baseline, entirely resolved abdominal pain    -ERCP normal w/out evidence of obstruction   -s/p diagnostic paracentesis negative for SBP and therapeutic paracentesis done 10/5 with~5L removed  -Bcx NGTD, ascitic fluid cx NGTD  -c/w empiric Zosyn

## 2023-10-07 NOTE — PROGRESS NOTE ADULT - ATTENDING COMMENTS
Assessment/recommendations as noted. Stable status post EUS performed 10/6-CBD dilation to 12 mm noted but no mass, stone or obstructing pathology. Features of portal congestive gastropathy noted. Await gastric biopsy. Advance diet as tolerated. Monitor serial liver enzymes.

## 2023-10-07 NOTE — PROGRESS NOTE ADULT - PROBLEM SELECTOR PLAN 3
patient with sodium of 127 today with 127 on admission ISO liver failure  - albumin repletion 10/6  - furosemide 40 qd, spironolactone 100 qd while hospitalized   - urine osm/urine sodium  - fluid restricted to 1000cc patient with sodium of 127 today with 127 on admission ISO liver failure    - furosemide 40 bid, spironolactone 100 bid, previously on 1/2 dose ISO hyponatremia   - urine osm/urine sodium  - fluid restricted to 1000cc

## 2023-10-07 NOTE — PROGRESS NOTE ADULT - ASSESSMENT
Patient is a 59 yo M PMHx EtOH abuse (3-5 double nayana whiskey 5 days a week x25 years) decompensated cirrhosis requiring numerous therapeutic paracentesis (last 9/26), prior DVT, prior positive C.diff presenting with 10/10 abdominal pain. Patient found to have Tbili elevated from baseline, as well as leukocytosis. CT A/P w/ CBD dilation.    #CBD dilation  - Tbili elevated in 5s (3s at time of discharge). WBC elevated although has been on prednisone taper for alc hep until recently.  - CT A/P IVC - distended gallbladder with new dilatation of the CBD.   - EUS (10/6): CBD dilation up to 12 mm without mass or stone. A few benign LN. PHG s/p biopsy. Duodenal erosions w/o bleeding.    #Cirrhosis 2/2 AUD. Moderate ascites. No recent EGD.  - hypoNa to 127    Recommendations  - advance diet as tolerated   - Await pathology  - remainder per primary & Hepatology    Skyler Celeste MD  Gastroenterology/Hepatology Fellow   Available on Microsoft Teams  [phone] ([employer]bolt Short Range Pager)  [phone] (Long Range Pager)    After 5pm, please contact the on-call GI fellow. [phone]

## 2023-10-07 NOTE — PROGRESS NOTE ADULT - SUBJECTIVE AND OBJECTIVE BOX
PROGRESS NOTE:   Authored by Diana Lo MD   Patient is a 58y old  Male who presents with a chief complaint of abdominal pain (06 Oct 2023 17:24)      SUBJECTIVE / OVERNIGHT EVENTS:  No acute events overnight. Patient observed at bedside resting comfortably Woken for evaluation. Saturating well on RA. No complaints.     ADDITIONAL REVIEW OF SYSTEMS:  patient denies n/v/d/sob/cp   endorses 1 bowel movement per day.     MEDICATIONS  (STANDING):  furosemide    Tablet 40 milliGRAM(s) Oral daily  lactulose Syrup 15 Gram(s) Oral two times a day  multivitamin 1 Tablet(s) Oral daily  spironolactone 100 milliGRAM(s) Oral daily    MEDICATIONS  (PRN):      CAPILLARY BLOOD GLUCOSE        I&O's Summary      PHYSICAL EXAM:  Vital Signs Last 24 Hrs  T(C): 36.8 (07 Oct 2023 05:58), Max: 36.8 (07 Oct 2023 05:58)  T(F): 98.3 (07 Oct 2023 05:58), Max: 98.3 (07 Oct 2023 05:58)  HR: 80 (07 Oct 2023 05:58) (70 - 81)  BP: 117/60 (07 Oct 2023 05:58) (101/60 - 126/65)  BP(mean): --  RR: 18 (07 Oct 2023 05:58) (12 - 20)  SpO2: 94% (07 Oct 2023 05:58) (94% - 100%)    Parameters below as of 07 Oct 2023 05:58  Patient On (Oxygen Delivery Method): room air        GENERAL: No apparent distress.   HEAD:  Atraumatic, Normocephalic  EYES: EOMI, PERRLA, conjunctiva and sclera clear  NECK: Supple, no lymphadenopathy, no elevated JVP  CHEST/LUNG: Clear to auscultation bilateral and symmetric; No wheezes, rales, or rhonchi  HEART: S1 and S2 normal. Regular rate and rhythm; No murmurs, rubs, or gallops  ABDOMEN: Soft, non-tender, non-distended; normal bowel sounds  EXTREMITIES:  2+ peripheral pulses b/l, No clubbing, cyanosis, or edema  NEUROLOGY: A&O x 3, no focal deficits  SKIN: No rashes or lesions    LABS:                        12.9   11.12 )-----------( 94       ( 07 Oct 2023 07:12 )             36.8     10-07    127<L>  |  93<L>  |  20  ----------------------------<  116<H>  4.5   |  22  |  1.00    Ca    8.8      07 Oct 2023 07:12  Phos  3.1     10-07  Mg     1.60     10-07    TPro  6.5  /  Alb  3.1<L>  /  TBili  3.6<H>  /  DBili  x   /  AST  59<H>  /  ALT  32  /  AlkPhos  249<H>  10-07    PT/INR - ( 07 Oct 2023 07:12 )   PT: 16.5 sec;   INR: 1.48 ratio         PTT - ( 07 Oct 2023 07:12 )  PTT:41.9 sec      Urinalysis Basic - ( 07 Oct 2023 07:12 )    Color: x / Appearance: x / SG: x / pH: x  Gluc: 116 mg/dL / Ketone: x  / Bili: x / Urobili: x   Blood: x / Protein: x / Nitrite: x   Leuk Esterase: x / RBC: x / WBC x   Sq Epi: x / Non Sq Epi: x / Bacteria: x          RADIOLOGY & ADDITIONAL TESTS:  Lab Results Reviewed   Imaging Reviewed  Electrocardiogram Reviewed   PROGRESS NOTE:   Authored by Diana Lo MD   Patient is a 58y old  Male who presents with a chief complaint of abdominal pain (06 Oct 2023 17:24)      SUBJECTIVE / OVERNIGHT EVENTS:  No acute events overnight. Patient observed at bedside resting comfortably Woken for evaluation. Saturating well on RA. No complaints.     ADDITIONAL REVIEW OF SYSTEMS:  patient denies n/v/d/sob/cp   endorses 1 bowel movement per day.     MEDICATIONS  (STANDING):  furosemide    Tablet 40 milliGRAM(s) Oral daily  lactulose Syrup 15 Gram(s) Oral two times a day  multivitamin 1 Tablet(s) Oral daily  spironolactone 100 milliGRAM(s) Oral daily    MEDICATIONS  (PRN):      CAPILLARY BLOOD GLUCOSE        I&O's Summary      PHYSICAL EXAM:  Vital Signs Last 24 Hrs  T(C): 36.8 (07 Oct 2023 05:58), Max: 36.8 (07 Oct 2023 05:58)  T(F): 98.3 (07 Oct 2023 05:58), Max: 98.3 (07 Oct 2023 05:58)  HR: 80 (07 Oct 2023 05:58) (70 - 81)  BP: 117/60 (07 Oct 2023 05:58) (101/60 - 126/65)  BP(mean): --  RR: 18 (07 Oct 2023 05:58) (12 - 20)  SpO2: 94% (07 Oct 2023 05:58) (94% - 100%)    Parameters below as of 07 Oct 2023 05:58  Patient On (Oxygen Delivery Method): room air        GENERAL: No apparent distress.   HEAD:  Atraumatic, Normocephalic  EYES: EOMI, PERRLA, conjunctiva and sclera clear  NECK: Supple, no lymphadenopathy, no elevated JVP  CHEST/LUNG: Clear to auscultation bilateral and symmetric; No wheezes, rales, or rhonchi  HEART: S1 and S2 normal. Regular rate and rhythm; No murmurs, rubs, or gallops  ABDOMEN: Soft, non-tender, distended with fluid wave, improved since admission; normal bowel sounds  EXTREMITIES:  2+ peripheral pulses b/l, No clubbing, cyanosis, or edema  NEUROLOGY: A&O x 3, no focal deficits  SKIN: hypertrophic lesions between pectoral and on neck, jaundiced    LABS:                        12.9   11.12 )-----------( 94       ( 07 Oct 2023 07:12 )             36.8     10-07    127<L>  |  93<L>  |  20  ----------------------------<  116<H>  4.5   |  22  |  1.00    Ca    8.8      07 Oct 2023 07:12  Phos  3.1     10-07  Mg     1.60     10-07    TPro  6.5  /  Alb  3.1<L>  /  TBili  3.6<H>  /  DBili  x   /  AST  59<H>  /  ALT  32  /  AlkPhos  249<H>  10-07    PT/INR - ( 07 Oct 2023 07:12 )   PT: 16.5 sec;   INR: 1.48 ratio         PTT - ( 07 Oct 2023 07:12 )  PTT:41.9 sec      Urinalysis Basic - ( 07 Oct 2023 07:12 )    Color: x / Appearance: x / SG: x / pH: x  Gluc: 116 mg/dL / Ketone: x  / Bili: x / Urobili: x   Blood: x / Protein: x / Nitrite: x   Leuk Esterase: x / RBC: x / WBC x   Sq Epi: x / Non Sq Epi: x / Bacteria: x          RADIOLOGY & ADDITIONAL TESTS:  Lab Results Reviewed   Imaging Reviewed  Electrocardiogram Reviewed

## 2023-10-07 NOTE — PROGRESS NOTE ADULT - ATTENDING COMMENTS
58M with decompensated cirrhosis. see today. labs with persistent hyponatremia. abd so distended but soft. non-tender. lungs clear. no LE edema. would increase Furosemide and Spironolactone. Hep following, wants expediated transplant evaluation.

## 2023-10-07 NOTE — PROGRESS NOTE ADULT - PROBLEM SELECTOR PLAN 4
Activity: No restriction  Bowel reg: Lactulose  Consultants: GI, IPT  Diet: Regular,   DVT ppx: HSQ  Dispo: Home  Directive: Full code  Electrolytes: replete prn   Family: daughter lives w/ him   Gtts:   Hardware: pIV

## 2023-10-07 NOTE — PROGRESS NOTE ADULT - PROBLEM SELECTOR PLAN 2
history of alcoholic cirrhosis w/ multiple therapeutic paracentesis w/ confirmed portal hypertension observed on CT  - GI consulted appreciate recs.    - patient receiving workup for liver transplant. Labs ordered and awaiting dobutamine stress echo. discharge for outpatient f/u vs. transfer to [facility] for continued workup.

## 2023-10-08 LAB
CULTURE RESULTS: SIGNIFICANT CHANGE UP
HERPES SIMPLEX VIRUS 1/2 SURVEILLANCE PCR RESULT: SIGNIFICANT CHANGE UP
HERPES SIMPLEX VIRUS 1/2 SURVEILLANCE PCR SOURCE: SIGNIFICANT CHANGE UP
HSV1+2 DNA SPEC QL NAA+PROBE: SIGNIFICANT CHANGE UP
SPECIMEN SOURCE: SIGNIFICANT CHANGE UP

## 2023-10-08 PROCEDURE — 99232 SBSQ HOSP IP/OBS MODERATE 35: CPT

## 2023-10-08 RX ORDER — FUROSEMIDE 40 MG
1 TABLET ORAL
Qty: 0 | Refills: 0 | DISCHARGE
Start: 2023-10-08

## 2023-10-08 RX ORDER — HEPARIN SODIUM 5000 [USP'U]/ML
5000 INJECTION INTRAVENOUS; SUBCUTANEOUS EVERY 12 HOURS
Refills: 0 | Status: DISCONTINUED | OUTPATIENT
Start: 2023-10-08 | End: 2023-10-10

## 2023-10-08 RX ORDER — SPIRONOLACTONE 25 MG/1
4 TABLET, FILM COATED ORAL
Qty: 0 | Refills: 0 | DISCHARGE
Start: 2023-10-08

## 2023-10-08 RX ORDER — BACITRACIN ZINC 500 UNIT/G
1 OINTMENT IN PACKET (EA) TOPICAL DAILY
Refills: 0 | Status: DISCONTINUED | OUTPATIENT
Start: 2023-10-08 | End: 2023-10-10

## 2023-10-08 RX ORDER — SPIRONOLACTONE 25 MG/1
1 TABLET, FILM COATED ORAL
Refills: 0 | DISCHARGE

## 2023-10-08 RX ADMIN — Medication 1 TABLET(S): at 12:43

## 2023-10-08 RX ADMIN — LACTULOSE 15 GRAM(S): 10 SOLUTION ORAL at 05:44

## 2023-10-08 RX ADMIN — SPIRONOLACTONE 100 MILLIGRAM(S): 25 TABLET, FILM COATED ORAL at 05:43

## 2023-10-08 RX ADMIN — HEPARIN SODIUM 5000 UNIT(S): 5000 INJECTION INTRAVENOUS; SUBCUTANEOUS at 17:54

## 2023-10-08 RX ADMIN — Medication 40 MILLIGRAM(S): at 05:43

## 2023-10-08 RX ADMIN — Medication 1 APPLICATION(S): at 14:44

## 2023-10-08 RX ADMIN — SPIRONOLACTONE 100 MILLIGRAM(S): 25 TABLET, FILM COATED ORAL at 17:54

## 2023-10-08 RX ADMIN — Medication 40 MILLIGRAM(S): at 17:54

## 2023-10-08 RX ADMIN — LACTULOSE 15 GRAM(S): 10 SOLUTION ORAL at 17:53

## 2023-10-08 NOTE — PROGRESS NOTE ADULT - ATTENDING COMMENTS
patient tolerated increased diuretics. awaiting stress test. plan for transfer to [facility] liver transplant unit. f/u with hepatology to help facilitate transfer.

## 2023-10-08 NOTE — PROGRESS NOTE ADULT - SUBJECTIVE AND OBJECTIVE BOX
PROGRESS NOTE:   Authored by Diana Lo MD   Patient is a 58y old  Male who presents with a chief complaint of abdominal pain (07 Oct 2023 13:23)      SUBJECTIVE / OVERNIGHT EVENTS:  No acute events overnight.     ADDITIONAL REVIEW OF SYSTEMS:    MEDICATIONS  (STANDING):  furosemide    Tablet 40 milliGRAM(s) Oral two times a day  lactulose Syrup 15 Gram(s) Oral two times a day  multivitamin 1 Tablet(s) Oral daily  spironolactone 100 milliGRAM(s) Oral two times a day    MEDICATIONS  (PRN):      CAPILLARY BLOOD GLUCOSE        I&O's Summary    07 Oct 2023 07:01  -  08 Oct 2023 07:00  --------------------------------------------------------  IN: 240 mL / OUT: 0 mL / NET: 240 mL        PHYSICAL EXAM:  Vital Signs Last 24 Hrs  T(C): 36.6 (08 Oct 2023 06:27), Max: 36.8 (07 Oct 2023 22:27)  T(F): 97.9 (08 Oct 2023 06:27), Max: 98.3 (07 Oct 2023 22:27)  HR: 75 (08 Oct 2023 06:27) (75 - 77)  BP: 133/77 (08 Oct 2023 06:27) (113/58 - 133/77)  BP(mean): --  RR: 17 (08 Oct 2023 06:27) (17 - 17)  SpO2: 97% (08 Oct 2023 06:27) (97% - 98%)    Parameters below as of 08 Oct 2023 06:27  Patient On (Oxygen Delivery Method): room air        GENERAL: No apparent distress.   HEAD:  Atraumatic, Normocephalic  EYES: EOMI, PERRLA, conjunctiva and sclera clear  NECK: Supple, no lymphadenopathy, no elevated JVP  CHEST/LUNG: Clear to auscultation bilateral and symmetric; No wheezes, rales, or rhonchi  HEART: S1 and S2 normal. Regular rate and rhythm; No murmurs, rubs, or gallops  ABDOMEN: Soft, non-tender, non-distended; normal bowel sounds  EXTREMITIES:  2+ peripheral pulses b/l, No clubbing, cyanosis, or edema  NEUROLOGY: A&O x 3, no focal deficits  SKIN: No rashes or lesions    LABS:                        12.9   11.12 )-----------( 94       ( 07 Oct 2023 07:12 )             36.8     10-07    127<L>  |  93<L>  |  20  ----------------------------<  116<H>  4.5   |  22  |  1.00    Ca    8.8      07 Oct 2023 07:12  Phos  3.1     10-07  Mg     1.60     10-07    TPro  6.5  /  Alb  3.1<L>  /  TBili  3.6<H>  /  DBili  x   /  AST  59<H>  /  ALT  32  /  AlkPhos  249<H>  10-07    PT/INR - ( 07 Oct 2023 07:12 )   PT: 16.5 sec;   INR: 1.48 ratio         PTT - ( 07 Oct 2023 07:12 )  PTT:41.9 sec      Urinalysis Basic - ( 07 Oct 2023 07:12 )    Color: x / Appearance: x / SG: x / pH: x  Gluc: 116 mg/dL / Ketone: x  / Bili: x / Urobili: x   Blood: x / Protein: x / Nitrite: x   Leuk Esterase: x / RBC: x / WBC x   Sq Epi: x / Non Sq Epi: x / Bacteria: x          RADIOLOGY & ADDITIONAL TESTS:  Lab Results Reviewed   Imaging Reviewed  Electrocardiogram Reviewed   PROGRESS NOTE:   Authored by Diana Lo MD   Patient is a 58y old  Male who presents with a chief complaint of abdominal pain (07 Oct 2023 13:23)      SUBJECTIVE / OVERNIGHT EVENTS:  No acute events overnight. patient scalp bleeding from picking     ADDITIONAL REVIEW OF SYSTEMS:  no abdominal pain    MEDICATIONS  (STANDING):  furosemide    Tablet 40 milliGRAM(s) Oral two times a day  lactulose Syrup 15 Gram(s) Oral two times a day  multivitamin 1 Tablet(s) Oral daily  spironolactone 100 milliGRAM(s) Oral two times a day    MEDICATIONS  (PRN):      CAPILLARY BLOOD GLUCOSE        I&O's Summary    07 Oct 2023 07:01  -  08 Oct 2023 07:00  --------------------------------------------------------  IN: 240 mL / OUT: 0 mL / NET: 240 mL        PHYSICAL EXAM:  Vital Signs Last 24 Hrs  T(C): 36.6 (08 Oct 2023 06:27), Max: 36.8 (07 Oct 2023 22:27)  T(F): 97.9 (08 Oct 2023 06:27), Max: 98.3 (07 Oct 2023 22:27)  HR: 75 (08 Oct 2023 06:27) (75 - 77)  BP: 133/77 (08 Oct 2023 06:27) (113/58 - 133/77)  BP(mean): --  RR: 17 (08 Oct 2023 06:27) (17 - 17)  SpO2: 97% (08 Oct 2023 06:27) (97% - 98%)    Parameters below as of 08 Oct 2023 06:27  Patient On (Oxygen Delivery Method): room air        GENERAL: No apparent distress.   HEAD:  Atraumatic, Normocephalic  EYES: EOMI, PERRLA, conjunctiva and sclera clear  NECK: Supple, no lymphadenopathy, no elevated JVP  CHEST/LUNG: Clear to auscultation bilateral and symmetric; No wheezes, rales, or rhonchi  HEART: S1 and S2 normal. Regular rate and rhythm; No murmurs, rubs, or gallops  ABDOMEN: Soft, distended w/ fluid wave and umbilical hernia with normal bowel sounds  EXTREMITIES:  2+ peripheral pulses b/l, No clubbing, cyanosis, or edema  NEUROLOGY: A&O x 3, no focal deficits  SKIN: hypertorphic rashes on neck and between pectoral from scratching and on scalp     LABS:                        12.9   11.12 )-----------( 94       ( 07 Oct 2023 07:12 )             36.8     10-07    127<L>  |  93<L>  |  20  ----------------------------<  116<H>  4.5   |  22  |  1.00    Ca    8.8      07 Oct 2023 07:12  Phos  3.1     10-07  Mg     1.60     10-07    TPro  6.5  /  Alb  3.1<L>  /  TBili  3.6<H>  /  DBili  x   /  AST  59<H>  /  ALT  32  /  AlkPhos  249<H>  10-07    PT/INR - ( 07 Oct 2023 07:12 )   PT: 16.5 sec;   INR: 1.48 ratio         PTT - ( 07 Oct 2023 07:12 )  PTT:41.9 sec      Urinalysis Basic - ( 07 Oct 2023 07:12 )    Color: x / Appearance: x / SG: x / pH: x  Gluc: 116 mg/dL / Ketone: x  / Bili: x / Urobili: x   Blood: x / Protein: x / Nitrite: x   Leuk Esterase: x / RBC: x / WBC x   Sq Epi: x / Non Sq Epi: x / Bacteria: x          RADIOLOGY & ADDITIONAL TESTS:  Lab Results Reviewed   Imaging Reviewed  Electrocardiogram Reviewed

## 2023-10-08 NOTE — PROGRESS NOTE ADULT - PROBLEM SELECTOR PLAN 2
history of alcoholic cirrhosis w/ multiple therapeutic paracentesis w/ confirmed portal hypertension observed on CT  - GI consulted appreciate recs.    - patient receiving workup for liver transplant. Labs ordered and awaiting dobutamine stress echo. Patient to be transferred to [facility] once bed is available

## 2023-10-08 NOTE — PROGRESS NOTE ADULT - PROBLEM SELECTOR PLAN 4
Activity: No restriction  Bowel reg: Lactulose  Consultants: GI, IPT  Diet: Regular,   DVT ppx:   Dispo: Home  Directive: Full code  Electrolytes: replete prn   Family: daughter lives w/ him   Gtts:   Hardware: pIV Activity: No restriction  Bowel reg: Lactulose  Consultants: GI, IPT  Diet: Regular,   DVT ppx:   Dispo: Home  Directive: Full code  Electrolytes: replete prn   Family: daughter lives w/ him   Gtts:   Skin: Bacitracin for scalp lesion   Hardware: pIV Activity: No restriction  Bowel reg: Lactulose  Consultants: GI, IPT  Diet: Regular,   DVT ppx: heparin   Dispo: Home  Directive: Full code  Electrolytes: replete prn   Family: daughter lives w/ him   Gtts:   Skin: Bacitracin for scalp lesion   Hardware: pIV

## 2023-10-08 NOTE — PROGRESS NOTE ADULT - PROBLEM SELECTOR PLAN 3
patient with sodium of 127 today with 127 on admission ISO liver failure    - furosemide 40 bid, spironolactone 100 bid, previously on 1/2 dose ISO hyponatremia   - urine osm/urine sodium  - fluid restricted to 1000cc

## 2023-10-08 NOTE — PROGRESS NOTE ADULT - PROBLEM SELECTOR PLAN 1
meets SIRS criteria for tachypnea RR>20 and elevated WBC upon admission now resolved.   Bili may be at baseline, entirely resolved abdominal pain    -ERCP normal w/out evidence of obstruction, gastric biopsy taken, f/u results.   -s/p diagnostic paracentesis negative for SBP and therapeutic paracentesis done 10/5 with~5L removed  -Bcx NGTD, ascitic fluid cx NGTD  -c/w empiric Zosyn

## 2023-10-08 NOTE — PROGRESS NOTE ADULT - ASSESSMENT
58M pmhx EtOH abuse, decompensated cirrhosis requiring numerous therapeutic paracentesis (last 9/26), prior remote hx of DVT not on AC, prior positive C.diff presenting with 10/10 abdominal pain. Found to have dilated CBD pending further workup  58M pmhx EtOH abuse, decompensated cirrhosis requiring numerous therapeutic paracentesis (last 9/26), prior remote hx of DVT not on AC, prior positive C.diff presenting with 10/10 abdominal pain. pt s/p paracentesis resolving abdominal pain CBD dilation w/ no acute cause. no structural issues found on ERCP. Patient pending expedited liver workup at [facility] and transfer

## 2023-10-09 LAB
ALBUMIN SERPL ELPH-MCNC: 3.2 G/DL — LOW (ref 3.3–5)
ALP SERPL-CCNC: 280 U/L — HIGH (ref 40–120)
ALT FLD-CCNC: 36 U/L — SIGNIFICANT CHANGE UP (ref 4–41)
ANA PAT FLD IF-IMP: ABNORMAL
ANA TITR SER: ABNORMAL
ANION GAP SERPL CALC-SCNC: 13 MMOL/L — SIGNIFICANT CHANGE UP (ref 7–14)
APTT BLD: 43.4 SEC — HIGH (ref 24.5–35.6)
AST SERPL-CCNC: 74 U/L — HIGH (ref 4–40)
BASOPHILS # BLD AUTO: 0.11 K/UL — SIGNIFICANT CHANGE UP (ref 0–0.2)
BASOPHILS NFR BLD AUTO: 0.9 % — SIGNIFICANT CHANGE UP (ref 0–2)
BILIRUB SERPL-MCNC: 3.8 MG/DL — HIGH (ref 0.2–1.2)
BUN SERPL-MCNC: 20 MG/DL — SIGNIFICANT CHANGE UP (ref 7–23)
CALCIUM SERPL-MCNC: 9.1 MG/DL — SIGNIFICANT CHANGE UP (ref 8.4–10.5)
CHLORIDE SERPL-SCNC: 94 MMOL/L — LOW (ref 98–107)
CO2 SERPL-SCNC: 23 MMOL/L — SIGNIFICANT CHANGE UP (ref 22–31)
CREAT SERPL-MCNC: 1.18 MG/DL — SIGNIFICANT CHANGE UP (ref 0.5–1.3)
EGFR: 72 ML/MIN/1.73M2 — SIGNIFICANT CHANGE UP
EOSINOPHIL # BLD AUTO: 0.33 K/UL — SIGNIFICANT CHANGE UP (ref 0–0.5)
EOSINOPHIL NFR BLD AUTO: 2.8 % — SIGNIFICANT CHANGE UP (ref 0–6)
GAMMA INTERFERON BACKGROUND BLD IA-ACNC: 0.01 IU/ML — SIGNIFICANT CHANGE UP
GLUCOSE SERPL-MCNC: 108 MG/DL — HIGH (ref 70–99)
HCT VFR BLD CALC: 40.5 % — SIGNIFICANT CHANGE UP (ref 39–50)
HGB BLD-MCNC: 14.2 G/DL — SIGNIFICANT CHANGE UP (ref 13–17)
IANC: 8.37 K/UL — HIGH (ref 1.8–7.4)
IMM GRANULOCYTES NFR BLD AUTO: 0.9 % — SIGNIFICANT CHANGE UP (ref 0–0.9)
INR BLD: 1.38 RATIO — HIGH (ref 0.85–1.18)
LKM AB SER-ACNC: <20.1 UNITS — SIGNIFICANT CHANGE UP (ref 0–20)
LYMPHOCYTES # BLD AUTO: 1.78 K/UL — SIGNIFICANT CHANGE UP (ref 1–3.3)
LYMPHOCYTES # BLD AUTO: 15.3 % — SIGNIFICANT CHANGE UP (ref 13–44)
M TB IFN-G BLD-IMP: NEGATIVE — SIGNIFICANT CHANGE UP
M TB IFN-G CD4+ BCKGRND COR BLD-ACNC: 0 IU/ML — SIGNIFICANT CHANGE UP
M TB IFN-G CD4+CD8+ BCKGRND COR BLD-ACNC: 0 IU/ML — SIGNIFICANT CHANGE UP
MAGNESIUM SERPL-MCNC: 1.7 MG/DL — SIGNIFICANT CHANGE UP (ref 1.6–2.6)
MCHC RBC-ENTMCNC: 34.1 PG — HIGH (ref 27–34)
MCHC RBC-ENTMCNC: 35.1 GM/DL — SIGNIFICANT CHANGE UP (ref 32–36)
MCV RBC AUTO: 97.4 FL — SIGNIFICANT CHANGE UP (ref 80–100)
MELD SCORE WITH DIALYSIS: 31 POINTS — SIGNIFICANT CHANGE UP
MELD SCORE WITHOUT DIALYSIS: 22 POINTS — SIGNIFICANT CHANGE UP
MONOCYTES # BLD AUTO: 0.9 K/UL — SIGNIFICANT CHANGE UP (ref 0–0.9)
MONOCYTES NFR BLD AUTO: 7.8 % — SIGNIFICANT CHANGE UP (ref 2–14)
NEUTROPHILS # BLD AUTO: 8.37 K/UL — HIGH (ref 1.8–7.4)
NEUTROPHILS NFR BLD AUTO: 72.3 % — SIGNIFICANT CHANGE UP (ref 43–77)
NRBC # BLD: 0 /100 WBCS — SIGNIFICANT CHANGE UP (ref 0–0)
NRBC # FLD: 0 K/UL — SIGNIFICANT CHANGE UP (ref 0–0)
PHOSPHATE SERPL-MCNC: 3.4 MG/DL — SIGNIFICANT CHANGE UP (ref 2.5–4.5)
PLATELET # BLD AUTO: 95 K/UL — LOW (ref 150–400)
POTASSIUM SERPL-MCNC: 4.3 MMOL/L — SIGNIFICANT CHANGE UP (ref 3.5–5.3)
POTASSIUM SERPL-SCNC: 4.3 MMOL/L — SIGNIFICANT CHANGE UP (ref 3.5–5.3)
PROT SERPL-MCNC: 6.9 G/DL — SIGNIFICANT CHANGE UP (ref 6–8.3)
PROTHROM AB SERPL-ACNC: 15.4 SEC — HIGH (ref 9.5–13)
QUANT TB PLUS MITOGEN MINUS NIL: 3.38 IU/ML — SIGNIFICANT CHANGE UP
RBC # BLD: 4.16 M/UL — LOW (ref 4.2–5.8)
RBC # FLD: 15.3 % — HIGH (ref 10.3–14.5)
SODIUM SERPL-SCNC: 130 MMOL/L — LOW (ref 135–145)
STRONGYLOIDES AB SER-ACNC: NEGATIVE — SIGNIFICANT CHANGE UP
VZV DNA, PCR RESULT: NEGATIVE — SIGNIFICANT CHANGE UP
WBC # BLD: 11.6 K/UL — HIGH (ref 3.8–10.5)
WBC # FLD AUTO: 11.6 K/UL — HIGH (ref 3.8–10.5)

## 2023-10-09 PROCEDURE — 93351 STRESS TTE COMPLETE: CPT | Mod: 26

## 2023-10-09 PROCEDURE — 99232 SBSQ HOSP IP/OBS MODERATE 35: CPT

## 2023-10-09 RX ADMIN — LACTULOSE 15 GRAM(S): 10 SOLUTION ORAL at 05:17

## 2023-10-09 RX ADMIN — SPIRONOLACTONE 100 MILLIGRAM(S): 25 TABLET, FILM COATED ORAL at 05:17

## 2023-10-09 RX ADMIN — Medication 1 TABLET(S): at 11:52

## 2023-10-09 RX ADMIN — Medication 1 APPLICATION(S): at 12:25

## 2023-10-09 RX ADMIN — Medication 40 MILLIGRAM(S): at 05:17

## 2023-10-09 NOTE — PROGRESS NOTE ADULT - PROBLEM SELECTOR PLAN 4
Activity: No restriction  Bowel reg: Lactulose  Consultants: GI, IPT  Diet: Regular,   DVT ppx: heparin   Dispo: Home  Directive: Full code  Electrolytes: replete prn   Family: daughter lives w/ him   Gtts:   Skin: Bacitracin for scalp lesion   Hardware: pIV

## 2023-10-09 NOTE — PROGRESS NOTE ADULT - ATTENDING COMMENTS
58M admitted for management of decompensated cirrhosis. His LFT is stable. clinically feels well. He is awaiting a Dobutamine stress test in preparation for future liver transplant. He is clinically stable at this time. will start d/c planning pending the stress test. hepatology following, f/u recs.

## 2023-10-09 NOTE — PROGRESS NOTE ADULT - SUBJECTIVE AND OBJECTIVE BOX
PROGRESS NOTE:   Authored by Ramiro Martinez MD  Patient is a 58y old  Male who presents with a chief complaint of abdominal pain (09 Oct 2023 08:19)      SUBJECTIVE / OVERNIGHT EVENTS:  No acute events overnight.     ADDITIONAL REVIEW OF SYSTEMS:    MEDICATIONS  (STANDING):  bacitracin   Ointment 1 Application(s) Topical daily  furosemide    Tablet 40 milliGRAM(s) Oral two times a day  heparin   Injectable 5000 Unit(s) SubCutaneous every 12 hours  lactulose Syrup 15 Gram(s) Oral two times a day  multivitamin 1 Tablet(s) Oral daily  spironolactone 100 milliGRAM(s) Oral two times a day    MEDICATIONS  (PRN):      CAPILLARY BLOOD GLUCOSE        I&O's Summary    08 Oct 2023 07:01  -  09 Oct 2023 07:00  --------------------------------------------------------  IN: 480 mL / OUT: 0 mL / NET: 480 mL        PHYSICAL EXAM:  Vital Signs Last 24 Hrs  T(C): 36.6 (09 Oct 2023 05:57), Max: 37.1 (08 Oct 2023 14:33)  T(F): 97.9 (09 Oct 2023 05:57), Max: 98.8 (08 Oct 2023 14:33)  HR: 66 (09 Oct 2023 05:57) (66 - 95)  BP: 118/65 (09 Oct 2023 05:57) (112/67 - 126/63)  BP(mean): --  RR: 16 (09 Oct 2023 05:57) (16 - 17)  SpO2: 97% (09 Oct 2023 05:57) (97% - 100%)    Parameters below as of 09 Oct 2023 05:57  Patient On (Oxygen Delivery Method): room air        GENERAL: No apparent distress.   HEAD:  Atraumatic, Normocephalic  EYES: EOMI, PERRLA, conjunctiva and sclera clear  NECK: Supple, no lymphadenopathy, no elevated JVP  CHEST/LUNG: Clear to auscultation bilateral and symmetric; No wheezes, rales, or rhonchi  HEART: S1 and S2 normal. Regular rate and rhythm; No murmurs, rubs, or gallops  ABDOMEN: Soft, non-tender, non-distended; normal bowel sounds  EXTREMITIES:  2+ peripheral pulses b/l, No clubbing, cyanosis, or edema  NEUROLOGY: A&O x 3, no focal deficits  SKIN: No rashes or lesions    LABS:                        14.2   11.60 )-----------( 95       ( 09 Oct 2023 06:18 )             40.5     10-09    130<L>  |  94<L>  |  20  ----------------------------<  108<H>  4.3   |  23  |  1.18    Ca    9.1      09 Oct 2023 06:18  Phos  3.4     10-09  Mg     1.70     10-09    TPro  6.9  /  Alb  3.2<L>  /  TBili  3.8<H>  /  DBili  x   /  AST  74<H>  /  ALT  36  /  AlkPhos  280<H>  10-09    PT/INR - ( 09 Oct 2023 06:18 )   PT: 15.4 sec;   INR: 1.38 ratio         PTT - ( 09 Oct 2023 06:18 )  PTT:43.4 sec      Urinalysis Basic - ( 09 Oct 2023 06:18 )    Color: x / Appearance: x / SG: x / pH: x  Gluc: 108 mg/dL / Ketone: x  / Bili: x / Urobili: x   Blood: x / Protein: x / Nitrite: x   Leuk Esterase: x / RBC: x / WBC x   Sq Epi: x / Non Sq Epi: x / Bacteria: x          RADIOLOGY & ADDITIONAL TESTS:  Lab Results Reviewed   Imaging Reviewed  Electrocardiogram Reviewed   PROGRESS NOTE:   Authored by Ramiro Martinez MD  Patient is a 58y old  Male who presents with a chief complaint of abdominal pain (09 Oct 2023 08:19)      SUBJECTIVE / OVERNIGHT EVENTS:  No acute events overnight. Patient was seen and examined at bedside and did not appear to be in acute distress. He states that he feels well today, and has felt the best he has been in a while. Reports an improvement in his abdominal distension and bloating. Denies any abdominal pain, nausea or vomiting     ADDITIONAL REVIEW OF SYSTEMS: Patient denies fevers, chills, chest pain, dyspnea, cough, dysuria or hematuria.     MEDICATIONS  (STANDING):  bacitracin   Ointment 1 Application(s) Topical daily  furosemide    Tablet 40 milliGRAM(s) Oral two times a day  heparin   Injectable 5000 Unit(s) SubCutaneous every 12 hours  lactulose Syrup 15 Gram(s) Oral two times a day  multivitamin 1 Tablet(s) Oral daily  spironolactone 100 milliGRAM(s) Oral two times a day    MEDICATIONS  (PRN):      CAPILLARY BLOOD GLUCOSE        I&O's Summary    08 Oct 2023 07:01  -  09 Oct 2023 07:00  --------------------------------------------------------  IN: 480 mL / OUT: 0 mL / NET: 480 mL        PHYSICAL EXAM:  Vital Signs Last 24 Hrs  T(C): 36.6 (09 Oct 2023 05:57), Max: 37.1 (08 Oct 2023 14:33)  T(F): 97.9 (09 Oct 2023 05:57), Max: 98.8 (08 Oct 2023 14:33)  HR: 66 (09 Oct 2023 05:57) (66 - 95)  BP: 118/65 (09 Oct 2023 05:57) (112/67 - 126/63)  BP(mean): --  RR: 16 (09 Oct 2023 05:57) (16 - 17)  SpO2: 97% (09 Oct 2023 05:57) (97% - 100%)    Parameters below as of 09 Oct 2023 05:57  Patient On (Oxygen Delivery Method): room air        GENERAL: No apparent distress.   HEAD:  Atraumatic, Normocephalic  EYES: EOMI, PERRLA, conjunctiva and sclera clear  NECK: Supple, no lymphadenopathy, no elevated JVP  CHEST/LUNG: Clear to auscultation bilateral and symmetric; No wheezes, rales, or rhonchi  HEART: S1 and S2 normal. Regular rate and rhythm; No murmurs, rubs, or gallops  ABDOMEN: distended abdomen with umbilical hernia. Not tender to palpation.   EXTREMITIES:  2+ peripheral pulses b/l, No clubbing, cyanosis, or edema  NEUROLOGY: A&O x 3, no focal deficits, no signs of asterixis   SKIN: Jaundiced skin     LABS:                        14.2   11.60 )-----------( 95       ( 09 Oct 2023 06:18 )             40.5     10-09    130<L>  |  94<L>  |  20  ----------------------------<  108<H>  4.3   |  23  |  1.18    Ca    9.1      09 Oct 2023 06:18  Phos  3.4     10-09  Mg     1.70     10-09    TPro  6.9  /  Alb  3.2<L>  /  TBili  3.8<H>  /  DBili  x   /  AST  74<H>  /  ALT  36  /  AlkPhos  280<H>  10-09    PT/INR - ( 09 Oct 2023 06:18 )   PT: 15.4 sec;   INR: 1.38 ratio         PTT - ( 09 Oct 2023 06:18 )  PTT:43.4 sec      Urinalysis Basic - ( 09 Oct 2023 06:18 )    Color: x / Appearance: x / SG: x / pH: x  Gluc: 108 mg/dL / Ketone: x  / Bili: x / Urobili: x   Blood: x / Protein: x / Nitrite: x   Leuk Esterase: x / RBC: x / WBC x   Sq Epi: x / Non Sq Epi: x / Bacteria: x          RADIOLOGY & ADDITIONAL TESTS:  Lab Results Reviewed   Imaging Reviewed  Electrocardiogram Reviewed

## 2023-10-09 NOTE — PROGRESS NOTE ADULT - PROBLEM SELECTOR PLAN 3
patient with sodium of 127 today with 127 on admission ISO liver failure    - furosemide 40 bid, spironolactone 100 bid, previously on 1/2 dose ISO hyponatremia   - urine osm/urine sodium  - fluid restricted to 1000cc improving  -Na-130(10/9)0 likely secondary to fluid overload from Cirrhosis.   - furosemide 40 bid, spironolactone 100 bid  - urine osm/urine sodium  - fluid restricted to 1000cc  -Will CTM

## 2023-10-09 NOTE — PROGRESS NOTE ADULT - ASSESSMENT
58 year old male with history of AUD, remote DVT [not on AC], and decompensated alcohol-associated cirrhosis c/b ascites and alcohol-associated hepatitis [s/p prednisone July-August 2023 with subsequent taper] who presents with abdominal pain.  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  Follows with hepatologist Dr. Simental and just completed prednisone taper last week.  On 9/29/2023, he developed acute, intermittent, sharp RUQ pain, worsened with meals.  He went to [facility] ED that night, his symptoms improved and was sent home, however only came back to [facility] due to worsening abdominal pain.  Otherwise, patient denies fevers, chills, weight loss, dysphagia, odynophagia, nausea, vomiting, diarrhea, melena, hematemesis, hematochezia, change in stool caliber, or family history of GI-related cancers.    # Acute, sharp, intermittent RUQ pain, resolved  # Leukocytosis, improved  # Hyperbilirubinemia above baseline, improved  # Newly dilated CBD to 12mm  # History of AUD  # Decompensated alcohol-associated cirrhosis c/b ascites and alcohol-associated hepatitis [s/p prednisone July-August 2023 with subsequent taper]        -MELD 3.0 = 21 on 10/9/2023       -Ascites: on Lasix 40mg BID and Aldactone 100mg BID at home       -SBP: negative; most recent paracentesis 10/3/2023       -Varices: unknown       -Hepatic encephalopathy: Ammonia, Serum: 38 umol/L [11 - 55] (10-03-23 @ 08:06)  lactulose Syrup 15 Gram(s) Oral two times a day, 10-03-23 @ 14:36, Routine       -HCC: none on CT imaging    Recommendations:  -trend clinical symptoms, exam findings, vital signs, CBC, CMP, INR  -EUS findings unremarkable  -no indication for steroids as he completed his course of prednisone with taper  -will readdress for pathway to transplant after serologies and transplant workup result, however liver chemistries and MELD improving    Note incomplete until finalized by attending signature/attestation.    Curtis Almeida  GI/Hepatology Fellow    MONDAY-FRIDAY 8AM-5PM:  Pager# [phone] ([facility]) or [phone] ([facility])    NON-URGENT CONSULTS:  Please email [email or web address]  AT NIGHT AND ON WEEKENDS:  Contact on-call GI fellow via answering service ([phone]) from 5pm-8am and on weekends/holidays

## 2023-10-09 NOTE — PROGRESS NOTE ADULT - PROBLEM SELECTOR PLAN 1
meets SIRS criteria for tachypnea RR>20 and elevated WBC upon admission now resolved.   Bili may be at baseline, entirely resolved abdominal pain    -ERCP normal w/out evidence of obstruction, gastric biopsy taken, f/u results.   -s/p diagnostic paracentesis negative for SBP and therapeutic paracentesis done 10/5 with~5L removed  -Bcx NGTD, ascitic fluid cx NGTD  -c/w empiric Zosyn meets SIRS criteria for tachypnea RR>20 and elevated WBC upon admission now resolved.   Bili may be at baseline, entirely resolved abdominal pain    -ERCP normal w/out evidence of obstruction, gastric biopsy taken, f/u results.   -s/p diagnostic paracentesis negative for SBP and therapeutic paracentesis done 10/5 with~5L removed  -Bcx NGTD, ascitic fluid cx NGTD

## 2023-10-09 NOTE — PROGRESS NOTE ADULT - ASSESSMENT
58M pmhx EtOH abuse, decompensated cirrhosis requiring numerous therapeutic paracentesis (last 9/26), prior remote hx of DVT not on AC, prior positive C.diff presenting with 10/10 abdominal pain. pt s/p paracentesis resolving abdominal pain CBD dilation w/ no acute cause. no structural issues found on ERCP. Patient pending expedited liver workup at [facility] and transfer  58M pmhx EtOH abuse, decompensated cirrhosis requiring numerous therapeutic paracentesis (last 9/26), prior remote hx of DVT not on AC, prior positive C.diff presenting with 10/10 abdominal pain. pt s/p paracentesis resolving abdominal pain CBD dilation w/ no acute cause. no structural issues found on ERCP. Patient remains stable with improvement in symptoms after Paracentesis. Patient is pending a possible transfer to [facility] for an expedited liver transplant workup.

## 2023-10-09 NOTE — PROGRESS NOTE ADULT - PROBLEM SELECTOR PLAN 2
history of alcoholic cirrhosis w/ multiple therapeutic paracentesis w/ confirmed portal hypertension observed on CT  - GI consulted appreciate recs.    - patient receiving workup for liver transplant. Labs ordered and awaiting dobutamine stress echo. Patient to be transferred to [facility] once bed is available history of alcoholic cirrhosis w/ multiple therapeutic paracentesis w/ confirmed portal hypertension observed on CT  - GI consulted appreciate recs.    - patient receiving workup for liver transplant.   -Labs ordered  -dobutamine stress echo-10/10   -Will f/u with Hepatology regarding patient transfer to [facility].

## 2023-10-09 NOTE — PROGRESS NOTE ADULT - SUBJECTIVE AND OBJECTIVE BOX
Interval Events:   No acute events overnight.  Patient without acute symptoms at this time.  Abdominal pain has resolved.    ROS:   12 point review of systems performed and negative except otherwise noted in HPI.    Hospital Medications:  bacitracin   Ointment 1 Application(s) Topical daily  furosemide    Tablet 40 milliGRAM(s) Oral two times a day  heparin   Injectable 5000 Unit(s) SubCutaneous every 12 hours  lactulose Syrup 15 Gram(s) Oral two times a day  multivitamin 1 Tablet(s) Oral daily  spironolactone 100 milliGRAM(s) Oral two times a day      PHYSICAL EXAM:   Vital Signs:  Vital Signs Last 24 Hrs  T(C): 36.6 (09 Oct 2023 05:57), Max: 37.1 (08 Oct 2023 14:33)  T(F): 97.9 (09 Oct 2023 05:57), Max: 98.8 (08 Oct 2023 14:33)  HR: 66 (09 Oct 2023 05:57) (66 - 95)  BP: 118/65 (09 Oct 2023 05:57) (112/67 - 126/63)  BP(mean): --  RR: 16 (09 Oct 2023 05:57) (16 - 17)  SpO2: 97% (09 Oct 2023 05:57) (97% - 100%)    Parameters below as of 09 Oct 2023 05:57  Patient On (Oxygen Delivery Method): room air      Daily     Daily     GENERAL: no acute distress  NEURO: alert  HEENT: NCAT, no conjunctival pallor appreciated  CHEST: no respiratory distress, no accessory muscle use  CARDIAC: regular rate, +S1/S2  ABDOMEN: soft, nontender, no rebound or guarding  EXTREMITIES: warm, well perfused  SKIN: no lesions noted    LABS: reviewed                        14.2   11.60 )-----------( 95       ( 09 Oct 2023 06:18 )             40.5     10-09    130<L>  |  94<L>  |  20  ----------------------------<  108<H>  4.3   |  23  |  1.18    Ca    9.1      09 Oct 2023 06:18  Phos  3.4     10-09  Mg     1.70     10-09    TPro  6.9  /  Alb  3.2<L>  /  TBili  3.8<H>  /  DBili  x   /  AST  74<H>  /  ALT  36  /  AlkPhos  280<H>  10-09    LIVER FUNCTIONS - ( 09 Oct 2023 06:18 )  Alb: 3.2 g/dL / Pro: 6.9 g/dL / ALK PHOS: 280 U/L / ALT: 36 U/L / AST: 74 U/L / GGT: x             Interval Diagnostic Studies: see sunrise for full report

## 2023-10-09 NOTE — PROGRESS NOTE ADULT - ATTENDING COMMENTS
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 Symptoms improved after an ED visit but returned, prompting admission. Notable findings include leukocytosis, hyperbilirubinemia, and a newly dilated common bile duct (CBD). EUS is unremarkable. ERCP was deferred. MELD score is 21, with ascites controlled by Lasix and Aldactone. No evidence of spontaneous bacterial peritonitis (SBP), varices, or hepatocellular carcinoma (HCC). Hepatic encephalopathy is managed with lactulose.  Recommendations include monitoring course, no steroid re-initiation, ongoing transplant evaluation, and addressing symptoms as liver chemistries and MELD improve. If stable possibly can be discharge to complete rest of the transplant work up as outpatient, and follow up with Dr. Simental, his primary transplant hepatologist.

## 2023-10-10 ENCOUNTER — TRANSCRIPTION ENCOUNTER (OUTPATIENT)
Age: 58
End: 2023-10-10

## 2023-10-10 VITALS
DIASTOLIC BLOOD PRESSURE: 67 MMHG | TEMPERATURE: 98 F | HEART RATE: 73 BPM | OXYGEN SATURATION: 98 % | RESPIRATION RATE: 17 BRPM | SYSTOLIC BLOOD PRESSURE: 124 MMHG

## 2023-10-10 DIAGNOSIS — D69.6 THROMBOCYTOPENIA, UNSPECIFIED: ICD-10-CM

## 2023-10-10 LAB
ALBUMIN SERPL ELPH-MCNC: 2.9 G/DL — LOW (ref 3.3–5)
ALP SERPL-CCNC: 230 U/L — HIGH (ref 40–120)
ALPHA-1-FETOPROTEIN-L3: 17.5 % — HIGH (ref 0–9.9)
ALPHA-1-FETOPROTEIN: 2.9 NG/ML — SIGNIFICANT CHANGE UP (ref 0–8.4)
ALT FLD-CCNC: 34 U/L — SIGNIFICANT CHANGE UP (ref 4–41)
ANION GAP SERPL CALC-SCNC: 11 MMOL/L — SIGNIFICANT CHANGE UP (ref 7–14)
APTT BLD: 40.7 SEC — HIGH (ref 24.5–35.6)
AST SERPL-CCNC: 69 U/L — HIGH (ref 4–40)
BILIRUB SERPL-MCNC: 3.3 MG/DL — HIGH (ref 0.2–1.2)
BUN SERPL-MCNC: 20 MG/DL — SIGNIFICANT CHANGE UP (ref 7–23)
CALCIUM SERPL-MCNC: 9.2 MG/DL — SIGNIFICANT CHANGE UP (ref 8.4–10.5)
CHLORIDE SERPL-SCNC: 95 MMOL/L — LOW (ref 98–107)
CMV DNA CSF QL NAA+PROBE: SIGNIFICANT CHANGE UP IU/ML
CMV DNA SPEC NAA+PROBE-LOG#: SIGNIFICANT CHANGE UP LOG10IU/ML
CO2 SERPL-SCNC: 23 MMOL/L — SIGNIFICANT CHANGE UP (ref 22–31)
CREAT SERPL-MCNC: 1.02 MG/DL — SIGNIFICANT CHANGE UP (ref 0.5–1.3)
EGFR: 86 ML/MIN/1.73M2 — SIGNIFICANT CHANGE UP
GLUCOSE SERPL-MCNC: 117 MG/DL — HIGH (ref 70–99)
HCT VFR BLD CALC: 37.3 % — LOW (ref 39–50)
HEV AB FLD QL: POSITIVE
HGB BLD-MCNC: 12.8 G/DL — LOW (ref 13–17)
INR BLD: 1.43 RATIO — HIGH (ref 0.85–1.18)
MAGNESIUM SERPL-MCNC: 1.5 MG/DL — LOW (ref 1.6–2.6)
MCHC RBC-ENTMCNC: 33.7 PG — SIGNIFICANT CHANGE UP (ref 27–34)
MCHC RBC-ENTMCNC: 34.3 GM/DL — SIGNIFICANT CHANGE UP (ref 32–36)
MCV RBC AUTO: 98.2 FL — SIGNIFICANT CHANGE UP (ref 80–100)
NRBC # BLD: 0 /100 WBCS — SIGNIFICANT CHANGE UP (ref 0–0)
NRBC # FLD: 0 K/UL — SIGNIFICANT CHANGE UP (ref 0–0)
PHOSPHATE SERPL-MCNC: 3.8 MG/DL — SIGNIFICANT CHANGE UP (ref 2.5–4.5)
PLATELET # BLD AUTO: 75 K/UL — LOW (ref 150–400)
POTASSIUM SERPL-MCNC: 4.6 MMOL/L — SIGNIFICANT CHANGE UP (ref 3.5–5.3)
POTASSIUM SERPL-SCNC: 4.6 MMOL/L — SIGNIFICANT CHANGE UP (ref 3.5–5.3)
PROT SERPL-MCNC: 6.5 G/DL — SIGNIFICANT CHANGE UP (ref 6–8.3)
PROTHROM AB SERPL-ACNC: 15.8 SEC — HIGH (ref 9.5–13)
RBC # BLD: 3.8 M/UL — LOW (ref 4.2–5.8)
RBC # FLD: 15.3 % — HIGH (ref 10.3–14.5)
SMOOTH MUSCLE AB SER-ACNC: ABNORMAL
SODIUM SERPL-SCNC: 129 MMOL/L — LOW (ref 135–145)
SOLUBLE LIVER IGG SER IA-ACNC: 1.6 — SIGNIFICANT CHANGE UP (ref 0–20)
WBC # BLD: 10.34 K/UL — SIGNIFICANT CHANGE UP (ref 3.8–10.5)
WBC # FLD AUTO: 10.34 K/UL — SIGNIFICANT CHANGE UP (ref 3.8–10.5)

## 2023-10-10 PROCEDURE — 99232 SBSQ HOSP IP/OBS MODERATE 35: CPT

## 2023-10-10 PROCEDURE — 99239 HOSP IP/OBS DSCHRG MGMT >30: CPT | Mod: GC

## 2023-10-10 RX ORDER — SPIRONOLACTONE 25 MG/1
4 TABLET, FILM COATED ORAL
Qty: 240 | Refills: 0
Start: 2023-10-10 | End: 2023-11-08

## 2023-10-10 RX ORDER — FUROSEMIDE 40 MG
1 TABLET ORAL
Qty: 60 | Refills: 0
Start: 2023-10-10 | End: 2023-11-08

## 2023-10-10 RX ORDER — MAGNESIUM SULFATE 500 MG/ML
2 VIAL (ML) INJECTION ONCE
Refills: 0 | Status: COMPLETED | OUTPATIENT
Start: 2023-10-10 | End: 2023-10-10

## 2023-10-10 RX ORDER — LACTULOSE 10 G/15ML
15 SOLUTION ORAL
Refills: 0 | DISCHARGE

## 2023-10-10 RX ORDER — LACTULOSE 10 G/15ML
15 SOLUTION ORAL
Qty: 900 | Refills: 0
Start: 2023-10-10 | End: 2023-11-08

## 2023-10-10 RX ADMIN — LACTULOSE 15 GRAM(S): 10 SOLUTION ORAL at 05:26

## 2023-10-10 RX ADMIN — Medication 25 GRAM(S): at 11:09

## 2023-10-10 RX ADMIN — SPIRONOLACTONE 100 MILLIGRAM(S): 25 TABLET, FILM COATED ORAL at 05:26

## 2023-10-10 RX ADMIN — Medication 40 MILLIGRAM(S): at 05:26

## 2023-10-10 NOTE — PROGRESS NOTE ADULT - PROBLEM SELECTOR PLAN 4
Activity: No restriction  Bowel reg: Lactulose  Consultants: GI, IPT  Diet: Regular,   DVT ppx: heparin   Dispo: Home  Directive: Full code  Electrolytes: replete prn   Family: daughter lives w/ him   Gtts:   Skin: Bacitracin for scalp lesion   Hardware: pIV -Etiology-likely 2/2 to liver dysfunction causing splenic sequestration along with TPO deficiency.   -Plt count-75(10/10_  -Will transfuse if below 10k< 20K if febrile and 50k if bleeding or needs procedure.

## 2023-10-10 NOTE — PROGRESS NOTE ADULT - PROVIDER SPECIALTY LIST ADULT
Gastroenterology
Hepatology
Hepatology
Internal Medicine
Gastroenterology
Hepatology
Hepatology
Internal Medicine

## 2023-10-10 NOTE — PROGRESS NOTE ADULT - SUBJECTIVE AND OBJECTIVE BOX
PROGRESS NOTE:   Authored by Ramiro Martinez MD  Patient is a 58y old  Male who presents with a chief complaint of abdominal pain (09 Oct 2023 09:20)      SUBJECTIVE / OVERNIGHT EVENTS:  No acute events overnight.     ADDITIONAL REVIEW OF SYSTEMS:    MEDICATIONS  (STANDING):  bacitracin   Ointment 1 Application(s) Topical daily  furosemide    Tablet 40 milliGRAM(s) Oral two times a day  heparin   Injectable 5000 Unit(s) SubCutaneous every 12 hours  lactulose Syrup 15 Gram(s) Oral two times a day  multivitamin 1 Tablet(s) Oral daily  spironolactone 100 milliGRAM(s) Oral two times a day    MEDICATIONS  (PRN):      CAPILLARY BLOOD GLUCOSE        I&O's Summary      PHYSICAL EXAM:  Vital Signs Last 24 Hrs  T(C): 36.7 (10 Oct 2023 05:21), Max: 36.9 (09 Oct 2023 21:30)  T(F): 98 (10 Oct 2023 05:21), Max: 98.4 (09 Oct 2023 21:30)  HR: 74 (10 Oct 2023 05:21) (74 - 80)  BP: 101/69 (10 Oct 2023 05:21) (101/69 - 112/63)  BP(mean): --  RR: 17 (10 Oct 2023 05:21) (17 - 17)  SpO2: 98% (10 Oct 2023 05:21) (96% - 98%)    Parameters below as of 10 Oct 2023 05:21  Patient On (Oxygen Delivery Method): room air        GENERAL: No apparent distress.   HEAD:  Atraumatic, Normocephalic  EYES: EOMI, PERRLA, conjunctiva and sclera clear  NECK: Supple, no lymphadenopathy, no elevated JVP  CHEST/LUNG: Clear to auscultation bilateral and symmetric; No wheezes, rales, or rhonchi  HEART: S1 and S2 normal. Regular rate and rhythm; No murmurs, rubs, or gallops  ABDOMEN: Soft, non-tender, non-distended; normal bowel sounds  EXTREMITIES:  2+ peripheral pulses b/l, No clubbing, cyanosis, or edema  NEUROLOGY: A&O x 3, no focal deficits  SKIN: No rashes or lesions    LABS:                        14.2   11.60 )-----------( 95       ( 09 Oct 2023 06:18 )             40.5     10-09    130<L>  |  94<L>  |  20  ----------------------------<  108<H>  4.3   |  23  |  1.18    Ca    9.1      09 Oct 2023 06:18  Phos  3.4     10-09  Mg     1.70     10-09    TPro  6.9  /  Alb  3.2<L>  /  TBili  3.8<H>  /  DBili  x   /  AST  74<H>  /  ALT  36  /  AlkPhos  280<H>  10-09    PT/INR - ( 09 Oct 2023 06:18 )   PT: 15.4 sec;   INR: 1.38 ratio         PTT - ( 09 Oct 2023 06:18 )  PTT:43.4 sec      Urinalysis Basic - ( 09 Oct 2023 06:18 )    Color: x / Appearance: x / SG: x / pH: x  Gluc: 108 mg/dL / Ketone: x  / Bili: x / Urobili: x   Blood: x / Protein: x / Nitrite: x   Leuk Esterase: x / RBC: x / WBC x   Sq Epi: x / Non Sq Epi: x / Bacteria: x          RADIOLOGY & ADDITIONAL TESTS:  Lab Results Reviewed   Imaging Reviewed  Electrocardiogram Reviewed   PROGRESS NOTE:   Authored by Ramiro Martinez MD  Patient is a 58y old  Male who presents with a chief complaint of abdominal pain (09 Oct 2023 09:20)      SUBJECTIVE / OVERNIGHT EVENTS:  No acute events overnight. Patient was seen and examined at bedside and did not appear to be in acute distress. He states that he feels well and is bored. Due to boredom, he picked at his scalp causing a superficial abrasion. Patient denies abdominal pain, nausea or vomiting.     ADDITIONAL REVIEW OF SYSTEMS: Patient denies chest pain, dyspnea, fevers, chills, hematuria or dysuria.     MEDICATIONS  (STANDING):  bacitracin   Ointment 1 Application(s) Topical daily  furosemide    Tablet 40 milliGRAM(s) Oral two times a day  heparin   Injectable 5000 Unit(s) SubCutaneous every 12 hours  lactulose Syrup 15 Gram(s) Oral two times a day  multivitamin 1 Tablet(s) Oral daily  spironolactone 100 milliGRAM(s) Oral two times a day    MEDICATIONS  (PRN):      CAPILLARY BLOOD GLUCOSE        I&O's Summary      PHYSICAL EXAM:  Vital Signs Last 24 Hrs  T(C): 36.7 (10 Oct 2023 05:21), Max: 36.9 (09 Oct 2023 21:30)  T(F): 98 (10 Oct 2023 05:21), Max: 98.4 (09 Oct 2023 21:30)  HR: 74 (10 Oct 2023 05:21) (74 - 80)  BP: 101/69 (10 Oct 2023 05:21) (101/69 - 112/63)  BP(mean): --  RR: 17 (10 Oct 2023 05:21) (17 - 17)  SpO2: 98% (10 Oct 2023 05:21) (96% - 98%)    Parameters below as of 10 Oct 2023 05:21  Patient On (Oxygen Delivery Method): room air        GENERAL: No apparent distress.   HEAD:  Atraumatic, Normocephalic  EYES: EOMI, PERRLA, conjunctiva and sclera clear  NECK: Supple, no lymphadenopathy, no elevated JVP  CHEST/LUNG: Clear to auscultation bilateral and symmetric; No wheezes, rales, or rhonchi  HEART: S1 and S2 normal. Regular rate and rhythm; No murmurs, rubs, or gallops  ABDOMEN: Distended abdomen, not-tender to palpation, umbilical hernia   EXTREMITIES:  2+ peripheral pulses b/l, No clubbing, cyanosis, or edema  NEUROLOGY: A&O x 3, no focal deficits, no asterixis   SKIN: Jaundiced skin, superficial scalp abrasion in left side.     LABS:                        14.2   11.60 )-----------( 95       ( 09 Oct 2023 06:18 )             40.5     10-09    130<L>  |  94<L>  |  20  ----------------------------<  108<H>  4.3   |  23  |  1.18    Ca    9.1      09 Oct 2023 06:18  Phos  3.4     10-09  Mg     1.70     10-09    TPro  6.9  /  Alb  3.2<L>  /  TBili  3.8<H>  /  DBili  x   /  AST  74<H>  /  ALT  36  /  AlkPhos  280<H>  10-09    PT/INR - ( 09 Oct 2023 06:18 )   PT: 15.4 sec;   INR: 1.38 ratio         PTT - ( 09 Oct 2023 06:18 )  PTT:43.4 sec      Urinalysis Basic - ( 09 Oct 2023 06:18 )    Color: x / Appearance: x / SG: x / pH: x  Gluc: 108 mg/dL / Ketone: x  / Bili: x / Urobili: x   Blood: x / Protein: x / Nitrite: x   Leuk Esterase: x / RBC: x / WBC x   Sq Epi: x / Non Sq Epi: x / Bacteria: x          RADIOLOGY & ADDITIONAL TESTS:  Lab Results Reviewed   Imaging Reviewed  Electrocardiogram Reviewed

## 2023-10-10 NOTE — PROGRESS NOTE ADULT - ASSESSMENT
58M pmhx EtOH abuse, decompensated cirrhosis requiring numerous therapeutic paracentesis (last 9/26), prior remote hx of DVT not on AC, prior positive C.diff presenting with 10/10 abdominal pain. pt s/p paracentesis resolving abdominal pain CBD dilation w/ no acute cause. no structural issues found on ERCP. Patient remains stable with improvement in symptoms after Paracentesis. Patient is pending a possible transfer to [facility] for an expedited liver transplant workup.        58M pmhx EtOH abuse, decompensated cirrhosis requiring numerous therapeutic paracentesis (last 9/26), prior remote hx of DVT not on AC, prior positive C.diff presenting with 10/10 abdominal pain. pt s/p paracentesis resolving abdominal pain CBD dilation w/ no acute cause. no structural issues found on ERCP. Patient remains stable with improvement in symptoms after Paracentesis. Patient is stable for discharge with outpatient f/u for liver transplant evaluation.

## 2023-10-10 NOTE — PROGRESS NOTE ADULT - PROBLEM SELECTOR PLAN 2
history of alcoholic cirrhosis w/ multiple therapeutic paracentesis w/ confirmed portal hypertension observed on CT  - GI consulted appreciate recs.    - patient receiving workup for liver transplant.   -Labs ordered  -dobutamine stress echo-10/10   -Will f/u with Hepatology regarding patient transfer to [facility]. history of alcoholic cirrhosis w/ multiple therapeutic paracentesis w/ confirmed portal hypertension observed on C   - patient receiving workup for liver transplant.   -Labs ordered  -dobutamine stress echo performed 10/9- no ischemic changes noted.   -Will f/u with Hepatology recs- patient can have outpatient f/u for liver transplant eval

## 2023-10-10 NOTE — PROGRESS NOTE ADULT - PROBLEM SELECTOR PLAN 3
improving  -Na-130(10/9)0 likely secondary to fluid overload from Cirrhosis.   - furosemide 40 bid, spironolactone 100 bid  - urine osm/urine sodium  - fluid restricted to 1000cc  -Will CTM stable  -Na-129(10/10) likely secondary to fluid overload from Cirrhosis.   - furosemide 40 bid, spironolactone 100 bid  - urine osm/urine sodium  - fluid restricted to 1000cc  -Will CTM

## 2023-10-10 NOTE — DISCHARGE NOTE NURSING/CASE MANAGEMENT/SOCIAL WORK - PATIENT PORTAL LINK FT
You can access the FollowMyHealth Patient Portal offered by [facility] by registering at the following website: [email or web address]. By joining [facility]’s FollowMyHealth portal, you will also be able to view your health information using other applications (apps) compatible with our system.

## 2023-10-10 NOTE — PROGRESS NOTE ADULT - ATTENDING COMMENTS
patient seen and examined with team today. patient feels well, eager to go home. he had stress test done, that was inconclusive due to inadequate HR and BP response. He is stable for d/c home. follow up outpatient with liver transplant for further care. refer to d/c summary

## 2023-10-10 NOTE — PROGRESS NOTE ADULT - ASSESSMENT
58 year old male with history of AUD, remote DVT [not on AC], and decompensated alcohol-associated cirrhosis c/b ascites and alcohol-associated hepatitis [s/p prednisone July-August 2023 with subsequent taper] who presents with abdominal pain.  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  Follows with hepatologist Dr. Simental and just completed prednisone taper last week.  On 9/29/2023, he developed acute, intermittent, sharp RUQ pain, worsened with meals.  He went to [facility] that night, his symptoms improved and was sent home, however only came back to [facility] due to worsening abdominal pain.  Otherwise, patient denies fevers, chills, weight loss, dysphagia, odynophagia, nausea, vomiting, diarrhea, melena, hematemesis, hematochezia, change in stool caliber, or family history of GI-related cancers.    # Acute, sharp, intermittent RUQ pain, resolved  # Leukocytosis, improved  # Hyperbilirubinemia above baseline, improved  # Newly dilated CBD to 12mm  # History of AUD  # Decompensated alcohol-associated cirrhosis c/b ascites and alcohol-associated hepatitis [s/p prednisone July-August 2023 with subsequent taper]        -MELD 3.0 = 20 on 10/10/2023       -Ascites: on Lasix 40mg BID and Aldactone 100mg BID at home       -SBP: negative; most recent paracentesis 10/3/2023       -Varices: unknown       -Hepatic encephalopathy: Ammonia, Serum: 38 umol/L [11 - 55] (10-03-23 @ 08:06)  lactulose Syrup 15 Gram(s) Oral two times a day, 10-03-23 @ 14:36, Routine       -HCC: none on CT imaging    Recommendations:  -trend clinical symptoms, exam findings, vital signs, CBC, CMP, INR  -EUS findings unremarkable  -resume home diuretics  -no indication for steroids as he completed his course of prednisone with taper  -recommend discharge from hepatology standpoint after noninvasive stress test, and will complete remainder of workup as outpatient  -will sign off at this time, please call back with questions or if new issues arise    Note incomplete until finalized by attending signature/attestation.    Curtis Almeida  GI/Hepatology Fellow    MONDAY-FRIDAY 8AM-5PM:  Pager# [phone] ([facility]) or [phone] ([facility])    NON-URGENT CONSULTS:  Please email [email or web address]  AT NIGHT AND ON WEEKENDS:  Contact on-call GI fellow via answering service ([phone]) from 5pm-8am and on weekends/holidays

## 2023-10-10 NOTE — PROGRESS NOTE ADULT - ATTENDING COMMENTS
A 58-year-old male with decompensated alcohol-associated cirrhosis and a history of alcohol-associated hepatitis, recently treated with prednisone, presented with acute RUQ pain. Leukocytosis improved, hyperbilirubinemia above baseline improved, and a newly dilated CBD was noted. MELD score is 20 on 10/10/2023. Ascites is managed with Lasix and Aldactone. No varices identified. Hepatic encephalopathy is controlled with lactulose. EUS findings are unremarkable ( no biliary obstruction/stone). The patient is stable; recommend resuming home diuretics, no indication for steroids. Discharge is suggested after a noninvasive stress test, with the remaining workup as an outpatient. Hepatology will sign off at this time, with the option to call back for questions or new issues.  Patient will follow up with Dr. Simental in the hepatology clinic as previously scheduled.

## 2023-10-10 NOTE — PROGRESS NOTE ADULT - PROBLEM SELECTOR PLAN 1
meets SIRS criteria for tachypnea RR>20 and elevated WBC upon admission now resolved.   Bili may be at baseline, entirely resolved abdominal pain    -ERCP normal w/out evidence of obstruction, gastric biopsy taken, f/u results.   -s/p diagnostic paracentesis negative for SBP and therapeutic paracentesis done 10/5 with~5L removed  -Bcx NGTD, ascitic fluid cx NGTD

## 2023-10-10 NOTE — PROGRESS NOTE ADULT - SUBJECTIVE AND OBJECTIVE BOX
Interval Events:   No acute events overnight.  Patient without acute symptoms at this time.    ROS:   12 point review of systems performed and negative except otherwise noted in HPI.    Hospital Medications:  bacitracin   Ointment 1 Application(s) Topical daily  furosemide    Tablet 40 milliGRAM(s) Oral two times a day  heparin   Injectable 5000 Unit(s) SubCutaneous every 12 hours  lactulose Syrup 15 Gram(s) Oral two times a day  magnesium sulfate  IVPB 2 Gram(s) IV Intermittent once  multivitamin 1 Tablet(s) Oral daily  spironolactone 100 milliGRAM(s) Oral two times a day      PHYSICAL EXAM:   Vital Signs:  Vital Signs Last 24 Hrs  T(C): 36.7 (10 Oct 2023 05:21), Max: 36.9 (09 Oct 2023 21:30)  T(F): 98 (10 Oct 2023 05:21), Max: 98.4 (09 Oct 2023 21:30)  HR: 74 (10 Oct 2023 05:21) (74 - 80)  BP: 101/69 (10 Oct 2023 05:21) (101/69 - 112/63)  BP(mean): --  RR: 17 (10 Oct 2023 05:21) (17 - 17)  SpO2: 98% (10 Oct 2023 05:21) (96% - 98%)    Parameters below as of 10 Oct 2023 05:21  Patient On (Oxygen Delivery Method): room air      Daily     Daily     GENERAL: no acute distress  NEURO: alert  HEENT: NCAT, no conjunctival pallor appreciated  CHEST: no respiratory distress, no accessory muscle use  CARDIAC: regular rate, +S1/S2  ABDOMEN: soft, nontender, no rebound or guarding  EXTREMITIES: warm, well perfused  SKIN: no lesions noted    LABS: reviewed                        12.8   10.34 )-----------( 75       ( 10 Oct 2023 05:53 )             37.3     10-10    129<L>  |  95<L>  |  20  ----------------------------<  117<H>  4.6   |  23  |  1.02    Ca    9.2      10 Oct 2023 05:53  Phos  3.8     10-10  Mg     1.50     10-10    TPro  6.5  /  Alb  2.9<L>  /  TBili  3.3<H>  /  DBili  x   /  AST  69<H>  /  ALT  34  /  AlkPhos  230<H>  10-10    LIVER FUNCTIONS - ( 10 Oct 2023 05:53 )  Alb: 2.9 g/dL / Pro: 6.5 g/dL / ALK PHOS: 230 U/L / ALT: 34 U/L / AST: 69 U/L / GGT: x             Interval Diagnostic Studies: see sunrise for full report

## 2023-10-11 LAB
B2 GLYCOPROT1 AB SER QL: NEGATIVE — SIGNIFICANT CHANGE UP
CARDIOLIPIN IGM SER-MCNC: 17.8 GPL — HIGH (ref 0–12.5)
MOLECULAR GENETICS - MTHFR GENE CASE: SIGNIFICANT CHANGE UP
PETH 16:0/18:1: NEGATIVE NG/ML — SIGNIFICANT CHANGE UP
PETH 16:0/18:2: NEGATIVE NG/ML — SIGNIFICANT CHANGE UP
PETH COMMENTS: SIGNIFICANT CHANGE UP

## 2023-10-12 LAB
DNA PLOIDY SPEC FC-IMP: SIGNIFICANT CHANGE UP
PTR INTERPRETATION: SIGNIFICANT CHANGE UP

## 2023-10-16 DIAGNOSIS — Z12.11 ENCOUNTER FOR SCREENING FOR MALIGNANT NEOPLASM OF COLON: ICD-10-CM

## 2023-10-17 ENCOUNTER — NON-APPOINTMENT (OUTPATIENT)
Age: 58
End: 2023-10-17

## 2023-10-17 ENCOUNTER — APPOINTMENT (OUTPATIENT)
Dept: HEART AND VASCULAR | Facility: CLINIC | Age: 58
End: 2023-10-17
Payer: COMMERCIAL

## 2023-10-17 VITALS
BODY MASS INDEX: 32.28 KG/M2 | WEIGHT: 213 LBS | HEART RATE: 88 BPM | HEIGHT: 68 IN | TEMPERATURE: 97 F | RESPIRATION RATE: 14 BRPM | DIASTOLIC BLOOD PRESSURE: 70 MMHG | SYSTOLIC BLOOD PRESSURE: 119 MMHG | OXYGEN SATURATION: 96 %

## 2023-10-17 DIAGNOSIS — R60.0 LOCALIZED EDEMA: ICD-10-CM

## 2023-10-17 DIAGNOSIS — Z78.9 OTHER SPECIFIED HEALTH STATUS: ICD-10-CM

## 2023-10-17 PROCEDURE — 99215 OFFICE O/P EST HI 40 MIN: CPT | Mod: 25

## 2023-10-17 PROCEDURE — 93000 ELECTROCARDIOGRAM COMPLETE: CPT

## 2023-10-17 RX ORDER — PREDNISONE 2.5 MG/1
2.5 TABLET ORAL DAILY
Qty: 90 | Refills: 0 | Status: DISCONTINUED | COMMUNITY
Start: 2023-09-12 | End: 2023-10-17

## 2023-10-17 RX ORDER — POLYETHYLENE GLYCOL 3350 AND ELECTROLYTES WITH LEMON FLAVOR 236; 22.74; 6.74; 5.86; 2.97 G/4L; G/4L; G/4L; G/4L; G/4L
236 POWDER, FOR SOLUTION ORAL
Qty: 1 | Refills: 0 | Status: DISCONTINUED | COMMUNITY
Start: 2023-09-15 | End: 2023-10-17

## 2023-10-18 ENCOUNTER — APPOINTMENT (OUTPATIENT)
Dept: TRANSPLANT | Facility: CLINIC | Age: 58
End: 2023-10-18

## 2023-10-18 ENCOUNTER — APPOINTMENT (OUTPATIENT)
Dept: TRANSPLANT | Facility: CLINIC | Age: 58
End: 2023-10-18
Payer: COMMERCIAL

## 2023-10-18 ENCOUNTER — APPOINTMENT (OUTPATIENT)
Dept: HEPATOLOGY | Facility: CLINIC | Age: 58
End: 2023-10-18
Payer: COMMERCIAL

## 2023-10-18 VITALS
BODY MASS INDEX: 30.92 KG/M2 | SYSTOLIC BLOOD PRESSURE: 119 MMHG | HEART RATE: 83 BPM | TEMPERATURE: 98.2 F | RESPIRATION RATE: 16 BRPM | HEIGHT: 68 IN | WEIGHT: 204 LBS | DIASTOLIC BLOOD PRESSURE: 68 MMHG | OXYGEN SATURATION: 96 %

## 2023-10-18 DIAGNOSIS — H61.23 IMPACTED CERUMEN, BILATERAL: ICD-10-CM

## 2023-10-18 DIAGNOSIS — Z76.82 AWAITING ORGAN TRANSPLANT STATUS: ICD-10-CM

## 2023-10-18 DIAGNOSIS — Z01.810 ENCOUNTER FOR PREPROCEDURAL CARDIOVASCULAR EXAMINATION: ICD-10-CM

## 2023-10-18 DIAGNOSIS — H93.8X3 OTHER SPECIFIED DISORDERS OF EAR, BILATERAL: ICD-10-CM

## 2023-10-18 DIAGNOSIS — Z87.891 PERSONAL HISTORY OF NICOTINE DEPENDENCE: ICD-10-CM

## 2023-10-18 DIAGNOSIS — K42.9 UMBILICAL HERNIA W/OUT OBSTRUCTION OR GANGRENE: ICD-10-CM

## 2023-10-18 LAB
CULTURE RESULTS: SIGNIFICANT CHANGE UP
CULTURE RESULTS: SIGNIFICANT CHANGE UP
SPECIMEN SOURCE: SIGNIFICANT CHANGE UP
SPECIMEN SOURCE: SIGNIFICANT CHANGE UP

## 2023-10-18 PROCEDURE — 99205 OFFICE O/P NEW HI 60 MIN: CPT

## 2023-10-18 PROCEDURE — 99215 OFFICE O/P EST HI 40 MIN: CPT

## 2023-10-19 ENCOUNTER — TRANSCRIPTION ENCOUNTER (OUTPATIENT)
Age: 58
End: 2023-10-19

## 2023-10-19 ENCOUNTER — APPOINTMENT (OUTPATIENT)
Dept: HEPATOLOGY | Facility: HOSPITAL | Age: 58
End: 2023-10-19

## 2023-10-19 ENCOUNTER — OUTPATIENT (OUTPATIENT)
Dept: OUTPATIENT SERVICES | Facility: HOSPITAL | Age: 58
LOS: 1 days | Discharge: HOME | End: 2023-10-19
Payer: COMMERCIAL

## 2023-10-19 VITALS
TEMPERATURE: 98 F | RESPIRATION RATE: 18 BRPM | HEIGHT: 68 IN | SYSTOLIC BLOOD PRESSURE: 112 MMHG | WEIGHT: 203.93 LBS | HEART RATE: 86 BPM | DIASTOLIC BLOOD PRESSURE: 68 MMHG | OXYGEN SATURATION: 98 %

## 2023-10-19 DIAGNOSIS — Z98.890 OTHER SPECIFIED POSTPROCEDURAL STATES: Chronic | ICD-10-CM

## 2023-10-19 PROCEDURE — 45378 DIAGNOSTIC COLONOSCOPY: CPT

## 2023-10-19 PROCEDURE — G0121: CPT

## 2023-10-19 NOTE — PRE-ANESTHESIA EVALUATION ADULT - NSANTHPMHFT_GEN_ALL_CORE
PMHx: Alcohol Use LDs, Cirrhosis, Ascites, Sensory-neural Hearing Loss    PSxHx: Knee Arthroscopy X 3, EGD

## 2023-10-19 NOTE — PRE-ANESTHESIA EVALUATION ADULT - NSANTHOSAYNRD_GEN_A_CORE
No. VENKAT screening performed.  STOP BANG Legend: 0-2 = LOW Risk; 3-4 = INTERMEDIATE Risk; 5-8 = HIGH Risk

## 2023-10-22 ENCOUNTER — NON-APPOINTMENT (OUTPATIENT)
Age: 58
End: 2023-10-22

## 2023-10-25 LAB
AFP-TM SERPL-MCNC: 4.5 NG/ML
ALBUMIN SERPL ELPH-MCNC: 3.6 G/DL
ALP BLD-CCNC: 259 U/L
ALT SERPL-CCNC: 42 U/L
ANION GAP SERPL CALC-SCNC: 13 MMOL/L
AST SERPL-CCNC: 78 U/L
BILIRUB SERPL-MCNC: 3.8 MG/DL
BUN SERPL-MCNC: 17 MG/DL
CALCIUM SERPL-MCNC: 9.4 MG/DL
CHLORIDE SERPL-SCNC: 93 MMOL/L
CO2 SERPL-SCNC: 24 MMOL/L
CREAT SERPL-MCNC: 1.12 MG/DL
EGFR: 77 ML/MIN/1.73M2
GLUCOSE SERPL-MCNC: 99 MG/DL
INR PPP: 1.34 RATIO
PETH 16:0/18:1: NEGATIVE NG/ML
PETH 16:0/18:2: NEGATIVE NG/ML
PETH COMMENTS: NORMAL
POTASSIUM SERPL-SCNC: 4.2 MMOL/L
PROT SERPL-MCNC: 7.1 G/DL
PT BLD: 15 SEC
SODIUM SERPL-SCNC: 130 MMOL/L

## 2023-10-26 NOTE — PROGRESS NOTE ADULT - SUBJECTIVE AND OBJECTIVE BOX
Instructions: This plan will send the code FBSE to the PM system.  DO NOT or CHANGE the price. Price (Do Not Change): 0.00 Detail Level: Simple PROGRESS NOTE:   Authored by Diana Lo MD   Patient is a 58y old  Male who presents with a chief complaint of abdominal pain (05 Oct 2023 13:53)      SUBJECTIVE / OVERNIGHT EVENTS:  Patient s/p 5.4L fluid removal via paracentesis. Observed resting comfortably. No acute complaints.     ADDITIONAL REVIEW OF SYSTEMS:  denies abdominal pain, HA/sob/n/v/d     MEDICATIONS  (STANDING):  albumin human 25% IVPB 100 milliLiter(s) IV Intermittent once  furosemide    Tablet 40 milliGRAM(s) Oral daily  lactulose Syrup 15 Gram(s) Oral two times a day  multivitamin 1 Tablet(s) Oral daily  piperacillin/tazobactam IVPB.. 3.375 Gram(s) IV Intermittent every 8 hours  spironolactone 100 milliGRAM(s) Oral daily    MEDICATIONS  (PRN):      CAPILLARY BLOOD GLUCOSE        I&O's Summary    05 Oct 2023 07:01  -  06 Oct 2023 07:00  --------------------------------------------------------  IN: 650 mL / OUT: 0 mL / NET: 650 mL        PHYSICAL EXAM:  Vital Signs Last 24 Hrs  T(C): 36.7 (06 Oct 2023 05:31), Max: 37.2 (05 Oct 2023 21:36)  T(F): 98.1 (06 Oct 2023 05:31), Max: 98.9 (05 Oct 2023 21:36)  HR: 70 (06 Oct 2023 05:31) (70 - 89)  BP: 122/60 (06 Oct 2023 05:31) (107/65 - 122/60)  BP(mean): --  RR: 18 (06 Oct 2023 05:31) (18 - 18)  SpO2: 98% (06 Oct 2023 05:31) (98% - 99%)    Parameters below as of 06 Oct 2023 05:31  Patient On (Oxygen Delivery Method): room air        GENERAL: No apparent distress.   HEAD:  Atraumatic, Normocephalic  EYES: EOMI, PERRLA, conjunctiva and sclera clear  NECK: Supple, no lymphadenopathy, no elevated JVP  CHEST/LUNG: Clear to auscultation bilateral and symmetric; No wheezes, rales, or rhonchi  HEART: S1 and S2 normal. Regular rate and rhythm; No murmurs, rubs, or gallops  ABDOMEN: Soft, non-tender, non-distended; normal bowel sounds  EXTREMITIES:  2+ peripheral pulses b/l, No clubbing, cyanosis, or edema  NEUROLOGY: A&O x 3, no focal deficits  SKIN: No rashes or lesions    LABS:                        13.3   10.39 )-----------( 89       ( 06 Oct 2023 05:20 )             37.0     10-06    125<L>  |  91<L>  |  17  ----------------------------<  104<H>  5.2   |  19<L>  |  1.00    Ca    8.8      06 Oct 2023 05:20  Phos  3.6     10-06  Mg     1.70     10-06    TPro  6.8  /  Alb  2.8<L>  /  TBili  4.1<H>  /  DBili  x   /  AST  83<H>  /  ALT  36  /  AlkPhos  240<H>  10-06    PT/INR - ( 06 Oct 2023 05:20 )   PT: 16.6 sec;   INR: 1.50 ratio         PTT - ( 06 Oct 2023 05:20 )  PTT:39.8 sec      Urinalysis Basic - ( 06 Oct 2023 05:20 )    Color: x / Appearance: x / SG: x / pH: x  Gluc: 104 mg/dL / Ketone: x  / Bili: x / Urobili: x   Blood: x / Protein: x / Nitrite: x   Leuk Esterase: x / RBC: x / WBC x   Sq Epi: x / Non Sq Epi: x / Bacteria: x        Culture - Urine (collected 03 Oct 2023 12:23)  Source: Clean Catch Clean Catch (Midstream)  Final Report (04 Oct 2023 18:01):    No growth    Culture - Blood (collected 03 Oct 2023 11:41)  Source: .Blood Blood-Peripheral  Preliminary Report (05 Oct 2023 15:01):    No growth at 48 Hours    Culture - Blood (collected 03 Oct 2023 11:34)  Source: .Blood Blood-Peripheral  Preliminary Report (05 Oct 2023 15:01):    No growth at 48 Hours    Culture - Body Fluid with Gram Stain (collected 03 Oct 2023 09:31)  Source: Ascites Fl Ascites Fluid  Gram Stain (03 Oct 2023 15:54):    polymorphonuclear leukocytes seen    No organisms seen    by cytocentrifuge  Preliminary Report (04 Oct 2023 12:00):    No growth to date.        RADIOLOGY & ADDITIONAL TESTS:  Lab Results Reviewed   Imaging Reviewed  Electrocardiogram Reviewed    PROGRESS NOTE:   Authored by Diana Lo MD   Patient is a 58y old  Male who presents with a chief complaint of abdominal pain (05 Oct 2023 13:53)      SUBJECTIVE / OVERNIGHT EVENTS:  Patient s/p 5.4L fluid removal via paracentesis. Observed resting comfortably. No acute complaints. Going for ERCP today. Has 1 BM per day.        ADDITIONAL REVIEW OF SYSTEMS:  denies abdominal pain, HA/sob/n/v/d     MEDICATIONS  (STANDING):  albumin human 25% IVPB 100 milliLiter(s) IV Intermittent once  furosemide    Tablet 40 milliGRAM(s) Oral daily  lactulose Syrup 15 Gram(s) Oral two times a day  multivitamin 1 Tablet(s) Oral daily  piperacillin/tazobactam IVPB.. 3.375 Gram(s) IV Intermittent every 8 hours  spironolactone 100 milliGRAM(s) Oral daily    MEDICATIONS  (PRN):      CAPILLARY BLOOD GLUCOSE        I&O's Summary    05 Oct 2023 07:01  -  06 Oct 2023 07:00  --------------------------------------------------------  IN: 650 mL / OUT: 0 mL / NET: 650 mL        PHYSICAL EXAM:  Vital Signs Last 24 Hrs  T(C): 36.7 (06 Oct 2023 05:31), Max: 37.2 (05 Oct 2023 21:36)  T(F): 98.1 (06 Oct 2023 05:31), Max: 98.9 (05 Oct 2023 21:36)  HR: 70 (06 Oct 2023 05:31) (70 - 89)  BP: 122/60 (06 Oct 2023 05:31) (107/65 - 122/60)  BP(mean): --  RR: 18 (06 Oct 2023 05:31) (18 - 18)  SpO2: 98% (06 Oct 2023 05:31) (98% - 99%)    Parameters below as of 06 Oct 2023 05:31  Patient On (Oxygen Delivery Method): room air        GENERAL: No apparent distress.   HEAD:  Atraumatic, Normocephalic  EYES: EOMI, PERRLA, conjunctiva and sclera clear  NECK: Supple, no lymphadenopathy, no elevated JVP  CHEST/LUNG: Clear to auscultation bilateral and symmetric; No wheezes, rales, or rhonchi  HEART: S1 and S2 normal. Regular rate and rhythm; No murmurs, rubs, or gallops  ABDOMEN: Soft, non-tender, distended, umbilical hernia, mild fluid wave   EXTREMITIES:  2+ peripheral pulses b/l, No clubbing, cyanosis, or edema  NEUROLOGY: A&O x 3, no focal deficits  SKIN: icteric skin throughout with darkened, hypertrophic rash behind neck and on sternum     LABS:                        13.3   10.39 )-----------( 89       ( 06 Oct 2023 05:20 )             37.0     10-06    125<L>  |  91<L>  |  17  ----------------------------<  104<H>  5.2   |  19<L>  |  1.00    Ca    8.8      06 Oct 2023 05:20  Phos  3.6     10-06  Mg     1.70     10-06    TPro  6.8  /  Alb  2.8<L>  /  TBili  4.1<H>  /  DBili  x   /  AST  83<H>  /  ALT  36  /  AlkPhos  240<H>  10-06    PT/INR - ( 06 Oct 2023 05:20 )   PT: 16.6 sec;   INR: 1.50 ratio         PTT - ( 06 Oct 2023 05:20 )  PTT:39.8 sec      Urinalysis Basic - ( 06 Oct 2023 05:20 )    Color: x / Appearance: x / SG: x / pH: x  Gluc: 104 mg/dL / Ketone: x  / Bili: x / Urobili: x   Blood: x / Protein: x / Nitrite: x   Leuk Esterase: x / RBC: x / WBC x   Sq Epi: x / Non Sq Epi: x / Bacteria: x        Culture - Urine (collected 03 Oct 2023 12:23)  Source: Clean Catch Clean Catch (Midstream)  Final Report (04 Oct 2023 18:01):    No growth    Culture - Blood (collected 03 Oct 2023 11:41)  Source: .Blood Blood-Peripheral  Preliminary Report (05 Oct 2023 15:01):    No growth at 48 Hours    Culture - Blood (collected 03 Oct 2023 11:34)  Source: .Blood Blood-Peripheral  Preliminary Report (05 Oct 2023 15:01):    No growth at 48 Hours    Culture - Body Fluid with Gram Stain (collected 03 Oct 2023 09:31)  Source: Ascites Fl Ascites Fluid  Gram Stain (03 Oct 2023 15:54):    polymorphonuclear leukocytes seen    No organisms seen    by cytocentrifuge  Preliminary Report (04 Oct 2023 12:00):    No growth to date.        RADIOLOGY & ADDITIONAL TESTS:  Lab Results Reviewed   Imaging Reviewed  Electrocardiogram Reviewed

## 2023-10-29 ENCOUNTER — RESULT REVIEW (OUTPATIENT)
Age: 58
End: 2023-10-29

## 2023-10-29 ENCOUNTER — APPOINTMENT (OUTPATIENT)
Age: 58
End: 2023-10-29

## 2023-10-29 ENCOUNTER — OUTPATIENT (OUTPATIENT)
Dept: OUTPATIENT SERVICES | Facility: HOSPITAL | Age: 58
LOS: 1 days | End: 2023-10-29
Payer: COMMERCIAL

## 2023-10-29 DIAGNOSIS — Z98.890 OTHER SPECIFIED POSTPROCEDURAL STATES: Chronic | ICD-10-CM

## 2023-10-29 PROCEDURE — 71250 CT THORAX DX C-: CPT | Mod: 26

## 2023-10-29 PROCEDURE — 71250 CT THORAX DX C-: CPT

## 2023-11-02 ENCOUNTER — NON-APPOINTMENT (OUTPATIENT)
Age: 58
End: 2023-11-02

## 2023-11-07 PROBLEM — K74.60 UNSPECIFIED CIRRHOSIS OF LIVER: Chronic | Status: ACTIVE | Noted: 2023-09-29

## 2023-11-10 ENCOUNTER — NON-APPOINTMENT (OUTPATIENT)
Age: 58
End: 2023-11-10

## 2023-11-10 DIAGNOSIS — E87.5 HYPERKALEMIA: ICD-10-CM

## 2023-11-10 LAB
AFP-TM SERPL-MCNC: 5.1 NG/ML
ALBUMIN SERPL ELPH-MCNC: 3.6 G/DL
ALP BLD-CCNC: 234 U/L
ALT SERPL-CCNC: 28 U/L
ANION GAP SERPL CALC-SCNC: 11 MMOL/L
AST SERPL-CCNC: 60 U/L
BILIRUB SERPL-MCNC: 2.7 MG/DL
BUN SERPL-MCNC: 25 MG/DL
CALCIUM SERPL-MCNC: 9.7 MG/DL
CHLORIDE SERPL-SCNC: 95 MMOL/L
CO2 SERPL-SCNC: 24 MMOL/L
CREAT SERPL-MCNC: 1.21 MG/DL
EGFR: 70 ML/MIN/1.73M2
ESTIMATED AVERAGE GLUCOSE: 100 MG/DL
GLUCOSE SERPL-MCNC: 103 MG/DL
HBA1C MFR BLD HPLC: 5.1 %
INR PPP: 1.35 RATIO
MEV IGG FLD QL IA: 105 AU/ML
MEV IGG+IGM SER-IMP: POSITIVE
MUV AB SER-ACNC: POSITIVE
MUV IGG SER QL IA: 80.4 AU/ML
POTASSIUM SERPL-SCNC: 5.4 MMOL/L
PROT SERPL-MCNC: 6.8 G/DL
PSA SERPL-MCNC: 0.07 NG/ML
PT BLD: 15.3 SEC
RUBV IGG FLD-ACNC: 26.7 INDEX
RUBV IGG SER-IMP: POSITIVE
SODIUM SERPL-SCNC: 130 MMOL/L
T GONDII AB SER-IMP: NEGATIVE
T GONDII IGG SER QL: <3 IU/ML
T PALLIDUM AB SER QL IA: NEGATIVE

## 2023-11-14 ENCOUNTER — NON-APPOINTMENT (OUTPATIENT)
Age: 58
End: 2023-11-14

## 2023-11-14 LAB
ANION GAP SERPL CALC-SCNC: 14 MMOL/L
BUN SERPL-MCNC: 19 MG/DL
CALCIUM SERPL-MCNC: 9.6 MG/DL
CHLORIDE SERPL-SCNC: 98 MMOL/L
CO2 SERPL-SCNC: 22 MMOL/L
CREAT SERPL-MCNC: 1.07 MG/DL
EGFR: 81 ML/MIN/1.73M2
GLUCOSE SERPL-MCNC: 119 MG/DL
PETH 16:0/18:1: NEGATIVE NG/ML
PETH 16:0/18:2: NEGATIVE NG/ML
PETH COMMENTS: NORMAL
POTASSIUM SERPL-SCNC: 4.7 MMOL/L
SODIUM SERPL-SCNC: 134 MMOL/L
STRONGYLOIDES AB SER IA-ACNC: NEGATIVE

## 2023-11-15 ENCOUNTER — NON-APPOINTMENT (OUTPATIENT)
Age: 58
End: 2023-11-15

## 2023-11-17 ENCOUNTER — OUTPATIENT (OUTPATIENT)
Dept: OUTPATIENT SERVICES | Facility: HOSPITAL | Age: 58
LOS: 1 days | End: 2023-11-17
Payer: COMMERCIAL

## 2023-11-17 ENCOUNTER — RESULT REVIEW (OUTPATIENT)
Age: 58
End: 2023-11-17

## 2023-11-17 ENCOUNTER — APPOINTMENT (OUTPATIENT)
Dept: INFECTIOUS DISEASE | Facility: CLINIC | Age: 58
End: 2023-11-17

## 2023-11-17 ENCOUNTER — APPOINTMENT (OUTPATIENT)
Dept: CT IMAGING | Facility: HOSPITAL | Age: 58
End: 2023-11-17
Payer: COMMERCIAL

## 2023-11-17 ENCOUNTER — APPOINTMENT (OUTPATIENT)
Dept: INFECTIOUS DISEASE | Facility: CLINIC | Age: 58
End: 2023-11-17
Payer: COMMERCIAL

## 2023-11-17 VITALS
HEIGHT: 68 IN | OXYGEN SATURATION: 98 % | HEART RATE: 77 BPM | BODY MASS INDEX: 33.19 KG/M2 | DIASTOLIC BLOOD PRESSURE: 74 MMHG | TEMPERATURE: 97.6 F | SYSTOLIC BLOOD PRESSURE: 138 MMHG | WEIGHT: 219 LBS

## 2023-11-17 DIAGNOSIS — Z98.890 OTHER SPECIFIED POSTPROCEDURAL STATES: Chronic | ICD-10-CM

## 2023-11-17 LAB
POCT ISTAT CREATININE: 1.3 MG/DL — SIGNIFICANT CHANGE UP (ref 0.5–1.3)
POCT ISTAT CREATININE: 1.3 MG/DL — SIGNIFICANT CHANGE UP (ref 0.5–1.3)

## 2023-11-17 PROCEDURE — 75574 CT ANGIO HRT W/3D IMAGE: CPT

## 2023-11-17 PROCEDURE — 99204 OFFICE O/P NEW MOD 45 MIN: CPT

## 2023-11-17 PROCEDURE — 82565 ASSAY OF CREATININE: CPT

## 2023-11-17 PROCEDURE — 75574 CT ANGIO HRT W/3D IMAGE: CPT | Mod: 26

## 2023-11-18 LAB
EBV EA AB SER IA-ACNC: <5 U/ML
EBV EA AB TITR SER IF: POSITIVE
EBV EA IGG SER QL IA: >600 U/ML
EBV EA IGG SER-ACNC: NEGATIVE
EBV EA IGM SER IA-ACNC: POSITIVE
EBV PATRN SPEC IB-IMP: NORMAL
EBV VCA IGG SER IA-ACNC: >750 U/ML
EBV VCA IGM SER QL IA: 58.7 U/ML
EPSTEIN-BARR VIRUS CAPSID ANTIGEN IGG: POSITIVE

## 2023-11-21 ENCOUNTER — APPOINTMENT (OUTPATIENT)
Dept: PULMONOLOGY | Facility: CLINIC | Age: 58
End: 2023-11-21
Payer: COMMERCIAL

## 2023-11-21 ENCOUNTER — OUTPATIENT (OUTPATIENT)
Dept: OUTPATIENT SERVICES | Facility: HOSPITAL | Age: 58
LOS: 1 days | End: 2023-11-21
Payer: COMMERCIAL

## 2023-11-21 ENCOUNTER — RESULT REVIEW (OUTPATIENT)
Age: 58
End: 2023-11-21

## 2023-11-21 VITALS
TEMPERATURE: 97.7 F | BODY MASS INDEX: 33.04 KG/M2 | HEIGHT: 68 IN | HEART RATE: 78 BPM | SYSTOLIC BLOOD PRESSURE: 110 MMHG | WEIGHT: 218 LBS | OXYGEN SATURATION: 98 % | DIASTOLIC BLOOD PRESSURE: 63 MMHG

## 2023-11-21 DIAGNOSIS — Z98.890 OTHER SPECIFIED POSTPROCEDURAL STATES: Chronic | ICD-10-CM

## 2023-11-21 PROCEDURE — 0504T: CPT

## 2023-11-21 PROCEDURE — 0503T: CPT

## 2023-11-21 PROCEDURE — 99204 OFFICE O/P NEW MOD 45 MIN: CPT

## 2023-11-21 PROCEDURE — 0502T: CPT

## 2023-11-22 ENCOUNTER — NON-APPOINTMENT (OUTPATIENT)
Age: 58
End: 2023-11-22

## 2023-11-22 LAB
C3 SERPL-MCNC: 120 MG/DL
C4 SERPL-MCNC: 14 MG/DL
CCP AB SER IA-ACNC: <8 UNITS
CENTROMERE IGG SER-ACNC: <0.2 AL
ENA SCL70 IGG SER IA-ACNC: <0.2 AL
ENA SS-A AB SER IA-ACNC: <0.2 AL
ENA SS-B AB SER IA-ACNC: <0.2 AL
ERYTHROCYTE [SEDIMENTATION RATE] IN BLOOD BY WESTERGREN METHOD: 57 MM/HR
NT-PROBNP SERPL-MCNC: 186 PG/ML
RF+CCP IGG SER-IMP: NEGATIVE
RHEUMATOID FACT SER QL: 10 IU/ML

## 2023-11-24 LAB
ANNOTATION COMMENT IMP: NOT DETECTED
HEPATITIS E QUANT BY PCR, IU/ML: NORMAL IU/ML
HEPATITIS E QUANT BY PCR, LOG IU/ML: <3.3
HEPATITIS E QUANT BY PCR, SOURCE: NORMAL

## 2023-11-28 ENCOUNTER — NON-APPOINTMENT (OUTPATIENT)
Age: 58
End: 2023-11-28

## 2023-11-28 LAB
ALDOLASE SERPL-CCNC: 7.3 U/L
ALTERN TENCAPG(M6): 7.4 MCG/ML
ANA PAT FLD IF-IMP: ABNORMAL
ANA SER IF-ACNC: ABNORMAL
ASPER FUMCAPG(M3): 3.2 MCG/ML
AUREOBASCAPG(M12): 2.1 MCG/ML
DEPRECATED CARDIOLIPIN IGA SER: 16 APL
DSDNA AB SER-ACNC: 474 IU/ML
LACEYELLA SACCHARI IGG: 6.6 MCG/ML
MICROPOLYCAPG(M22): <2 MCG/ML
PENIC CHRYCAPG(M1): 2.4 MCG/ML
PHOMA BETAE IGG: 5.4 MCG/ML
TRICHODERMA VIRIDE IGG: <2 MCG/ML

## 2023-11-29 ENCOUNTER — APPOINTMENT (OUTPATIENT)
Dept: HEPATOLOGY | Facility: CLINIC | Age: 58
End: 2023-11-29
Payer: COMMERCIAL

## 2023-11-29 VITALS
RESPIRATION RATE: 16 BRPM | HEART RATE: 79 BPM | DIASTOLIC BLOOD PRESSURE: 71 MMHG | SYSTOLIC BLOOD PRESSURE: 129 MMHG | WEIGHT: 220 LBS | HEIGHT: 68 IN | OXYGEN SATURATION: 98 % | TEMPERATURE: 97.9 F | BODY MASS INDEX: 33.34 KG/M2

## 2023-11-29 DIAGNOSIS — R18.8 OTHER ASCITES: ICD-10-CM

## 2023-11-29 DIAGNOSIS — K70.10 ALCOHOLIC HEPATITIS W/OUT ASCITES: ICD-10-CM

## 2023-11-29 PROCEDURE — 99214 OFFICE O/P EST MOD 30 MIN: CPT

## 2023-12-01 ENCOUNTER — APPOINTMENT (OUTPATIENT)
Dept: RHEUMATOLOGY | Facility: CLINIC | Age: 58
End: 2023-12-01
Payer: COMMERCIAL

## 2023-12-01 ENCOUNTER — NON-APPOINTMENT (OUTPATIENT)
Age: 58
End: 2023-12-01

## 2023-12-01 VITALS
HEIGHT: 68 IN | BODY MASS INDEX: 33.34 KG/M2 | SYSTOLIC BLOOD PRESSURE: 126 MMHG | OXYGEN SATURATION: 97 % | DIASTOLIC BLOOD PRESSURE: 65 MMHG | TEMPERATURE: 98.1 F | HEART RATE: 72 BPM | WEIGHT: 220 LBS

## 2023-12-01 DIAGNOSIS — R93.89 ABNORMAL FINDINGS ON DIAGNOSTIC IMAGING OF OTHER SPECIFIED BODY STRUCTURES: ICD-10-CM

## 2023-12-01 DIAGNOSIS — R76.8 OTHER SPECIFIED ABNORMAL IMMUNOLOGICAL FINDINGS IN SERUM: ICD-10-CM

## 2023-12-01 LAB
AFP-TM SERPL-MCNC: 4.3 NG/ML
ALBUMIN SERPL ELPH-MCNC: 3.2 G/DL
ALP BLD-CCNC: 248 U/L
ALT SERPL-CCNC: 27 U/L
ANION GAP SERPL CALC-SCNC: 12 MMOL/L
AST SERPL-CCNC: 56 U/L
BILIRUB SERPL-MCNC: 2.3 MG/DL
BUN SERPL-MCNC: 20 MG/DL
CALCIUM SERPL-MCNC: 9.7 MG/DL
CHLORIDE SERPL-SCNC: 97 MMOL/L
CO2 SERPL-SCNC: 26 MMOL/L
CREAT SERPL-MCNC: 1.13 MG/DL
EGFR: 76 ML/MIN/1.73M2
GLUCOSE SERPL-MCNC: 91 MG/DL
INR PPP: 1.34 RATIO
POTASSIUM SERPL-SCNC: 4.8 MMOL/L
PROT SERPL-MCNC: 6.7 G/DL
PT BLD: 15.1 SEC
SODIUM SERPL-SCNC: 135 MMOL/L

## 2023-12-01 PROCEDURE — 36415 COLL VENOUS BLD VENIPUNCTURE: CPT

## 2023-12-01 PROCEDURE — 99203 OFFICE O/P NEW LOW 30 MIN: CPT | Mod: 25

## 2023-12-01 RX ORDER — LACTULOSE 10 G/15ML
10 SOLUTION ORAL TWICE DAILY
Qty: 900 | Refills: 5 | Status: ACTIVE | COMMUNITY
Start: 2023-09-21 | End: 1900-01-01

## 2023-12-05 ENCOUNTER — NON-APPOINTMENT (OUTPATIENT)
Age: 58
End: 2023-12-05

## 2023-12-05 LAB
PETH 16:0/18:1: 248 NG/ML
PETH 16:0/18:2: >400 NG/ML
PETH COMMENTS: NORMAL

## 2023-12-06 ENCOUNTER — APPOINTMENT (OUTPATIENT)
Dept: TRANSPLANT | Facility: CLINIC | Age: 58
End: 2023-12-06
Payer: COMMERCIAL

## 2023-12-06 VITALS
RESPIRATION RATE: 17 BRPM | TEMPERATURE: 98.4 F | OXYGEN SATURATION: 97 % | DIASTOLIC BLOOD PRESSURE: 82 MMHG | HEIGHT: 68 IN | HEART RATE: 86 BPM | WEIGHT: 231 LBS | SYSTOLIC BLOOD PRESSURE: 152 MMHG | BODY MASS INDEX: 35.01 KG/M2

## 2023-12-06 PROCEDURE — 99214 OFFICE O/P EST MOD 30 MIN: CPT

## 2023-12-06 PROCEDURE — 99204 OFFICE O/P NEW MOD 45 MIN: CPT

## 2023-12-11 ENCOUNTER — APPOINTMENT (OUTPATIENT)
Dept: PULMONOLOGY | Facility: CLINIC | Age: 58
End: 2023-12-11
Payer: COMMERCIAL

## 2023-12-11 PROCEDURE — ZZZZZ: CPT

## 2023-12-11 PROCEDURE — 94729 DIFFUSING CAPACITY: CPT

## 2023-12-11 PROCEDURE — 94727 GAS DIL/WSHOT DETER LNG VOL: CPT

## 2023-12-11 PROCEDURE — 94010 BREATHING CAPACITY TEST: CPT

## 2023-12-11 PROCEDURE — 94618 PULMONARY STRESS TESTING: CPT

## 2023-12-15 LAB
EJ AB SER QL: NEGATIVE
ENA JO1 AB SER IA-ACNC: <20 UNITS
ENA PM/SCL AB SER-ACNC: <20 UNITS
ENA SM+RNP AB SER IA-ACNC: <20 UNITS
ENA SS-A IGG SER QL: 40 UNITS
FIBRILLARIN AB SER QL: NEGATIVE
KU AB SER QL: NEGATIVE
MDA-5 (P140)(CADM-140): <20 UNITS
MI2 AB SER QL: NEGATIVE
NXP-2 (P140): <20 UNITS
OJ AB SER QL: NEGATIVE
PL12 AB SER QL: NEGATIVE
PL7 AB SER QL: NEGATIVE
SRP AB SERPL QL: NEGATIVE
TIF GAMMA (P155/140): <20 UNITS
U2 SNRNP AB SER QL: NEGATIVE

## 2023-12-20 NOTE — HISTORY OF PRESENT ILLNESS
[TextBox_4] : 58y male with ETOH cirrhosis and Alcohol use disorder who presents for follow-up. Pt was admitted [facility] (23-23) due to new ascites s/p LVPx2 (SBP-) and found to have cirrhosis and C.diff (no diarrhea, Abx+). patient is undergoing work up for liver transplant. referred to pulmonary given abnormal CT findings.  denies dyspnea on exertion or rest.  ?rheumatoid arthritis. h/o recurrent left knee pain, last arthrocentesis done 8-9 years ago.   PMHHTN (currently not one med), Pt was seen by PCP before , history of remote DVT no longer on AC, Small L fat-containing inguinal hernia, umbilical hernia PSxHx3 L knee operations - 20 yrs old SH: has not worked since hospitalization, is a . few cigs a day x 5 years, quit 25 years ago.  FMHx Grandparents/Father - drank alot of ETOH father, Ht attack, s/p 4x bypass mother heavy smoker, Lung cancer,   Meds: lactulose, furosemide, spironolactone.

## 2023-12-20 NOTE — REVIEW OF SYSTEMS
[Fever] : no fever [Fatigue] : no fatigue [Chills] : no chills [Cough] : no cough [Sputum] : no sputum [Dyspnea] : no dyspnea [SOB on Exertion] : no sob on exertion [GERD] : no gerd [Abdominal Pain] : no abdominal pain [Diarrhea] : no diarrhea [Headache] : no headache [Dizziness] : no dizziness

## 2023-12-20 NOTE — ASSESSMENT
[FreeTextEntry1] : Data reviewed:  CT chest  LUNGS AND AIRWAYS: Patent central airways. Bilateral subpleural reticular opacities, upper lung zone predominant few central peribronchial vascular groundglass opacities in right middle and right lower lobe (5, 165; 5 177). No honeycombing, traction bronchiectasis or bronchiolectasis. No nodules or consolidation. PLEURA: No pleural effusion. MEDIASTINUM AND MARLA: No adenopathy VESSELS: Severe coronary calcifications HEART: Heart size is normal. No pericardial effusion. CHEST WALL AND LOWER NECK: Within normal limits. VISUALIZED UPPER ABDOMEN: Again, cirrhosis and stigmata of portal hypertension. Unchanged heterogenous hypoattenuation in right hepatic lobe, probably due to steatosis. Since October 3, 2023, decreased ascites. BONES: Upon directed review, few nonacute left posterior ribs fractures, including fracture nonunion left posterior seventh rib.  Impression/Plan: 1. Interstitial lung abnormality 2. Pre tranplant evaluation for liver transplant  - CT chest showed b/l upper lobe predominant subpleural reticulation and ground glass abnormality. this is not a high resolution CT. no air trapping noted .  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 thus possibility of CTD ILD vs HP.  - check serologies for connective tissue disease, HP panel. if serologies are negative, this will be considered a case of interstitial lung abnormality which will need longitudinal f/u - check PFT, 6mwt   3. former smoker < 20 pack year history does not meet criteria for lung cancer screening  Addendum  PFt with only midlly reduced diffusion capacity likely in the setting of minimal interstitial lung abnormalities.  no exercise desatuation. he has f/u coming up with Dr. Coy he can f/u with Dr. Greene in 4 months called patient on 12-20 and 12-19 to discuss pft, no anwer, TARAH left.

## 2023-12-21 ENCOUNTER — APPOINTMENT (OUTPATIENT)
Dept: RHEUMATOLOGY | Facility: CLINIC | Age: 58
End: 2023-12-21

## 2023-12-21 NOTE — ASSESSMENT
[FreeTextEntry1] : 58 year old man, w/ a PMHx of ETOH cirrhosis 2/2 alcohol use disorder, who presents for a pre-transplant cardiovascular evaluation.   Preoperative Cardiovascular Assessment: He was admitted [facility] from 7/18/23-7/28/23 w/ ascites and was found to have cirrhosis requiring liver transplant. Currently the patient denies any acute complaints, stating "I feel better than ever." He is able to ambulate >10 blocks or >2 flights of stairs w/o inhibition; and denies CP, SOB/SMITH, palpitations, dizziness/LOC, PND, orthopnea, HAs, and abdominal pain. DSE was performed on 10/9/23, however was nondiagnostic. The BP dropped at 30mcg of Dobutamine infusion to 85/34. The following Bp was 77/34. This occurred at 74% Max predicted HR. There was no ischemia on ECG or ECHO however given that the MPHR was <80% this result is nondiagnostic. The patient was asymptomatic throughout study.  - RCRI Score 1, Class II risk: 6.0% 30-day risk of death, MI, or cardiac arrest - patient able to achieve >4 METs w/o anginal symptoms or anginal equivalents - patient is at intermediate risk for an elevated risk surgery - no cardiac contraindications to the planned procedure - results of DSE transmitted to transplant service; and if non-diagnostic nature of the study is unacceptable will order a CCTA  Ascites: Patient endorses compliance w/ diuretic regimen for ascites. No overt ascited on today's exam. - continue furosemide 80mg PO QDay (2 40mg tabs) - continue spironolactone 200mg PO QDay (2 100mg tabs) - f/u at ensuing visits  LE Edema: Patient w/ 1+ b/l LE pitting edema. This arose at the same time as ascites as per patient, and is improved. Resting echocardiogram done prior to DSE w/o evidence of cardiomyopathy. On exam, no JVD, S3 or pulmonary rales. Therefore, edema likely 2/2 hepatic pathology. - continue furosemide 80mg PO QDay (2 40mg tabs) - continue spironolactone 200mg PO QDay (2 100mg tabs) - will f/u on exam at ensuing visits  EtOH Cirrhosis: Patient is currently undergoing an evaluation for a liver transplantation. MELD 25, Child Sheikh C. - advised to avoid NSAIDs, ok to take Tylenol not exceeding 2000mg/ 24hrs - reinforce the importance of abstaining from ETOH - prednisone completed on 9/29/23  F/u in 3 months.

## 2023-12-21 NOTE — HISTORY OF PRESENT ILLNESS
[FreeTextEntry1] : 58 year old man, w/ a PMHx of ETOH cirrhosis 2/2 alcohol use disorder, who presents for a pre-transplant cardiovascular evaluation. He was admitted [facility] from 7/18/23-7/28/23 w/ ascites and was found to have cirrhosis requiring liver transplant. Currently the patient denies any acute complaints, stating "I feel better than ever." He is able to ambulate >10 blocks or >2 flights of stairs w/o inhibition; and denies CP, SOB/SMITH, palpitations, dizziness/LOC, PND, orthopnea, HAs, and abdominal pain. Patient endorses compliance w/ diuretic regimen for ascites.

## 2023-12-21 NOTE — ADDENDUM
[FreeTextEntry1] : Preoperative Cardiovascular Assessment: Patient is planed to have liver transpland and hernia repair. He was admitted [facility] from 7/18/23-7/28/23 w/ ascites and was found to have cirrhosis requiring liver transplant. Currently the patient denies any acute complaints, stating "I feel better than ever." He is able to ambulate >10 blocks or >2 flights of stairs w/o inhibition; and denies CP, SOB/SMITH, palpitations, dizziness/LOC, PND, orthopnea, HAs, and abdominal pain. DSE was performed on 10/9/23, however was nondiagnostic. The BP dropped at 30mcg of Dobutamine infusion to 85/34. The following BP was 77/34. This occurred at 74% Max predicted HR. There was no ischemia on ECG or ECHO however given that the MPHR was <80% this result is nondiagnostic. The patient was asymptomatic throughout study. Given this, a CCTA was obtained and found moderate stenoses of the pmLAD and pLCx. FFR analysis was performed on these lesions and the pmLAD and pLCx were all found to be FFR negative. The dLAD was slightly CT FFR positive but given the distal nature of this and the patient's lack of symptoms no further cardiac interventions or analyses were warranted. - RCRI Score 1, Class II risk: 6.0% 30-day risk of death, MI, or cardiac arrest - patient able to achieve >4 METs w/o anginal symptoms or anginal equivalents - patient is at intermediate risk for an elevated risk surgery - no cardiac contraindications to the planned procedure

## 2023-12-21 NOTE — CARDIOLOGY SUMMARY
[de-identified] : 10/17/23: NSR at 88bpm [de-identified] : DSE 10/9/23:  1. Dobutamine stress echo is nondiagnostic. The BP dropped at 30mcg of Dobutamine infusion to 85/34. The following Bp was 77/34. This occured at 74% Max predicted HR. There was no ischemia on ECG or ECHO however given that the MPHR was <80% this result is nondiagnostic. The patient was asymptomatic throughout study. The BP normalized minutes after Dobutamine was discontinued. Additionally, 250cc NS was given.  2. The echocardiogram is non-diagnostic due to inadequate heart rate and systolic BP product achieved however, no ischemic changes seen at 74 % of maximum heart rate achieved.  3. No significant induced arrhythmias.

## 2024-01-03 ENCOUNTER — RX RENEWAL (OUTPATIENT)
Age: 59
End: 2024-01-03

## 2024-01-03 ENCOUNTER — APPOINTMENT (OUTPATIENT)
Dept: HEPATOLOGY | Facility: CLINIC | Age: 59
End: 2024-01-03

## 2024-01-03 DIAGNOSIS — Z01.818 ENCOUNTER FOR OTHER PREPROCEDURAL EXAMINATION: ICD-10-CM

## 2024-01-03 DIAGNOSIS — K74.60 UNSPECIFIED CIRRHOSIS OF LIVER: ICD-10-CM

## 2024-01-03 RX ORDER — FUROSEMIDE 40 MG/1
40 TABLET ORAL DAILY
Qty: 60 | Refills: 0 | Status: ACTIVE | COMMUNITY
Start: 2023-09-12 | End: 1900-01-01

## 2024-01-03 RX ORDER — SPIRONOLACTONE 100 MG/1
100 TABLET ORAL DAILY
Qty: 60 | Refills: 0 | Status: ACTIVE | COMMUNITY
Start: 2023-09-12 | End: 1900-01-01

## 2024-01-25 ENCOUNTER — APPOINTMENT (OUTPATIENT)
Dept: GASTROENTEROLOGY | Facility: HOSPITAL | Age: 59
End: 2024-01-25

## 2024-01-31 ENCOUNTER — RX RENEWAL (OUTPATIENT)
Age: 59
End: 2024-01-31

## 2024-02-26 NOTE — HISTORY OF PRESENT ILLNESS
[FreeTextEntry1] : 16 [TextBox_42] : 59 yo M, former smoker ? with hx of remote DVT (not on AC), umbilical hernia, small L fat-containing inguinal hernia, AUD and decompensated ETOH-associated cirrhosis c/b ascites s/p multiple LVPs (SBP neg) and a history of alcohol-associated hepatitis s/p prednisone (July-2023 with subsequent taper ending ). He was re-hospitalized  for abdominal pain. Hosp course significant for Leukocytosis, hyperbilirubinemia above baseline, and a newly noted dilated CBD to 12mm. He underwent EUS with findings unremarkable (no biliary obstruction/stone), no varices identified. Pt discharged when stabilized. Pt undergoing liver transplant evaluation here for f/u.  Sober since , though + PEth 248 (first positive since ), [facility] following.  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 Completed prednisone taper last week (early Oct, 2023).  2023, he developed acute, intermittent, sharp RUQ pain, worsened with meals. He went to OSH ED that night, his symptoms improved and was sent home, however only came back to [facility] due to worsening abdominal pain.  SURGICAL HX 3 L knee operations - 20 yrs old  FAM HX Grandparents/Father - drank a lot of ETOH Father, Ht attack, s/p 4x bypass Mother, heavy smoker, Lung cancer,   no hx liver disease in fam  SOCIAL HX ETOH 3-5 double Italian whiskey 5 days/wk x 25yrs, stopped early  TOB, never? Denies hx withdrawal , 2 children has not worked since hospitalization, is a .  LABS  23 (23) AFP 4.3 A1C 5.1 Na 135 K 4.8 SCr 1.13 (1.07) TB  2.3 (2.7) AST 56 (60) ALT 27 (28)  (259) INR 1.34  MEDICATIONS spironolactone 200mg daily lasix 80mg daily lactulose cholesterol medication  STUDIES  CTAP 10/3/23 FINDINGS: LOWER CHEST: Linear atelectasis at the right lung base. Left basilar atelectasis. LIVER: Cirrhosis. BILE DUCTS: CBD dilation to 1.2 cm number new since 2023. GALLBLADDER: Distended gallbladder with small layering sludge. SPLEEN: Splenomegaly. PANCREAS: Within normal limits. ADRENALS: Within normal limits. KIDNEYS/URETERS: Within normal limits. BLADDER: Within normal limits. REPRODUCTIVE ORGANS: Prostate within normal limits. BOWEL: No bowel obstruction. Appendix is normal. Diverticulosis without evidence of diverticulitis. PERITONEUM: Moderate ascites. VESSELS: Paraumbilical, paraesophageal, gastrohepatic and perisplenic collaterals. Small splenorenal shunt. RETROPERITONEUM/LYMPH NODES: No lymphadenopathy. ABDOMINAL WALL: Fat-containing left inguinal hernia. Ascites filled umbilical hernia.. BONES: Within normal limits.  IMPRESSION: Cirrhosis and portal hypertension. Moderate ascites. Distended gallbladder with new dilatation of the CBD. Correlate with LFTs.  CT Angio ABD 23 (New ascites and bilateral lower extremity swelling. Evaluate for cirrhosis, Budd-Chiari, or portal venous thrombus.) Cirrhosis and portal hypertension with moderate abdominopelvic ascites. No portal thrombosis. Portal and hepatic veins are patent.  Upper EUS 10/6/23 Findings: ENDOSCOPIC FINDINGS: The examined esophagus was normal. Moderate portal hypertensive gastropathy was found in the entire examined stomach. Biopsies were taken with a cold forceps for Helicobacter pylori testing. Multiple localized erosions without bleeding were found in the first portion of the duodenum.  ENDOSONOGRAPHIC FINDING: There was dilation in the common bile duct which measured up to 12 mm, no evidence of stone or mass. There was evidence of wall-thickening of the bile duct likely secondary to portal hypertension. Pancreatic parenchymal abnormalities were noted in the entire pancreas. These consisted of lobularity. Endosonographic imaging in the entire pancreas showed no pancreatic duct changes. A few benign-appearing lymph nodes were visualized in the gastrohepatic region, peripancreatic region and jonna hepatis region. The largest measured 10 mm in maximal cross-sectional diameter. The nodes were hypoechoic and had well defined margins.  Impression: There was dilation in the common bile duct which measured up to 12 mm, no evidence of stone or mass. Pancreatic parenchymal abnormalities consisting of lobularity were noted in the entire pancreas. A few benign lymph nodes were visualized in the gastrohepatic region, peripancreatic region and jonna hepatis region. Portal hypertensive gastropathy. Biopsied. Duodenal erosions without bleeding. PATH Stomach, biopsy - Superficial sampling of gastric mucosa with no significant diagnostic alterations - No morphologic evidence of Helicobacter microorganisms  - Negative for intestinal metaplasia  COLON 10/19/23 BBPS = 10 Impressions: Normal mucosa in the terminal ileum. -Mild diverticulosis of left colon. -Mucosal edema of ascending and transverse colon suggestive of portal hypertensive colopathy. -Medium size hemorrhoid vs rectal varix with no red sign.. RPT Colonoscopy in 5 years.  DSE 10/9/2023 1. Dobutamine stress echo is nondiagnostic. The BP dropped at 30mcg of Dobutamine infusion to 85/34. The following Bp was 77/34. This occurred at 74% Max predicted HR. There was no ischemia on ECG or ECHO however given that the MPHR was <80% this result is nondiagnostic. The patient was asymptomatic throughout study. The BP normalized minutes after Dobutamine was discontinued. Additionally, 250cc NS was given.  2. The echocardiogram is non-diagnostic due to inadequate heart rate and systolic BP product achieved however, no ischemic changes seen at 74 % of maximum heart rate achieved.  3. No significant induced arrhythmias.  TTE 23 EF 61% 1. Endocardium not well visualized; grossly normal L ventricular systolic function. Endocardial visualization enhanced w IV injection of echo contrast (Definity). 2. Mild diastolic dysfunction (Stage I). 3. Normal right ventricular size and function.  EKG 23 Vent rate 93 Normal sinus rhythm Inferior infarct, age undetermined Cannot rule out Anterior infarct, age undetermined Abnormal ECG  CORONARY CTA 23 Cardiac: 1. The calcium score is severe at 909 Agatston units, which is at the 98 percentile, adjusted for age, gender and race. 2. Moderate stenosis of the proximal circumflex coronary artery. 3. Moderate stenosis of the proximal and mid left anterior descending coronary artery. 4. Severe stenosis of the first diagonal division.  INTERVAL HX [FreeTextEntry2] : B+

## 2024-02-26 NOTE — ASSESSMENT
[FreeTextEntry1] : 59yo M iwth ETOH cirr and portal htn (ascites)  # Cirrhosis/ HCC screening no HCC 10/3/23 DUE APRIL  Portal HTN continue diuretics aldactone 200, lasix 80 HE, lactulose  # Dilated CBD to 12mm EUS with findings unremarkable (no biliary obstruction/stone)  LIVER TXP EVAL HCV donor acceptance YES  PENDING W/U Cards, Dr. Rodriguez - cleared -  'DSE 10/9/23, however was nondiagnostic. The BP dropped at 30mcg of Dobutamine infusion to 85/34. The following BP was 77/34. This occurred at 74% Max predicted HR. There was no ischemia on ECG or ECHO however given that the MPHR was <80% this result is nondiagnostic. The patient was asymptomatic throughout study. Given this, a CCTA was obtained and found moderate stenoses of the pmLAD and pLCx. FFR analysis was performed on these lesions and the pmLAD and pLCx were all found to be FFR negative. The dLAD was slightly CT FFR positive but given the distal nature of this and the patient's lack of symptoms no further cardiac interventions or analyses were warranted. Rheum f/u - per Dr. Brown, autoimmune serologies were checked with KATIE findings on CT. +ve MARTA, dsDNA, lupus anticoagulant. referred to Rheumatologist Dr. Coy Dec 3 - 'At this time, patient does not meet criteria for an underlying connective tissue disease, however given lung findings and positive serologies, will obtain AVISE CTD and myomarker panel for further evaluation.' - RCRI Score 1, Class II risk: 6.0% 30-day risk of death, MI, or cardiac arrest - pt able to achieve >4 METs w/o anginal symptoms or anginal equivalents - patient is at intermediate risk for an elevated risk surgery - no cardiac contraindications to the planned procedure' YANNI, RPP - relapse = + PeTH + PEth 248 (first positive since Sept 20),, pt has not enrolled in RPP, needs to enroll in RPP, now especiall given his relapse. [facility] - will put her in touch virtually at time of clinic visit.  #abdl hernia consulted by Dr. Dagher 12/6 continue wearing abdl band plan for hernia repair in January, scd team unable to reach pt  COMPLETED W/U COLON ID, 11/17/23, Cleared by Dr. Yeager. PULM,  f/b Dr. Brown for bilat upper lobe interstitial opacities per CT - PFt with only midlly reduced diffusion capacity likely in the setting of minimal interstitial lung abnormalities   PT NO-SHOW to appt

## 2024-03-04 ENCOUNTER — NON-APPOINTMENT (OUTPATIENT)
Age: 59
End: 2024-03-04

## 2024-03-15 NOTE — PROGRESS NOTE ADULT - PROBLEM SELECTOR PLAN 12
Addended by: ARNULFO SIMS on: 3/15/2024 10:50 AM     Modules accepted: Orders     HSQ obese diet: regular  dvt ppx: HSQ  code: full  dispo: pending clinical improvement

## 2024-04-05 ENCOUNTER — NON-APPOINTMENT (OUTPATIENT)
Age: 59
End: 2024-04-05

## 2024-09-26 ENCOUNTER — APPOINTMENT (OUTPATIENT)
Dept: OTOLARYNGOLOGY | Facility: CLINIC | Age: 59
End: 2024-09-26

## 2024-12-27 NOTE — PATIENT PROFILE ADULT - NSPROPTRIGHTSUPPORTPERSON_GEN_A_NUR
2/2 MS  baseline oriented to name and place, oriented to name only this afternoon  mostly bedbound for 4-5 years, does get OOB to chair,  Has 12x7 Medicaid provided HA who does wound care daily, visiting wound care PA visits weekly  dependent of ADL's.  PPSV2 20%. yes

## 2025-05-01 NOTE — PRE-OP CHECKLIST - VIA
Albaro Guerin Darron  : 1946  Primary: [employer] [place] [employer] (Medicare Managed)  Secondary:  [facility] [place]  [place] [address]  Phone: [phone]  Fax: [phone] Plan Frequency: 2x/week    Plan of Care/Certification Expiration Date: 25        Plan of Care/Certification Expiration Date:  Plan of Care/Certification Expiration Date: 25    Frequency/Duration: Plan Frequency: 2x/week      Time In/Out:   Time In: 1630  Time Out: 1715      PT Visit Info:         Visit Count:  3    OUTPATIENT PHYSICAL THERAPY:   Treatment Note 2025       Episode  (LE weakness, left shoulder pain, balance)               Treatment Diagnosis:    No data found  Muscle weakness (generalized)  Left shoulder pain, unspecified chronicity  Other low back pain  Medical/Referring Diagnosis:    Acute pain of left shoulder [M25.512]  Degeneration of intervertebral disc of lumbar region with discogenic back pain [M51.360]  Weakness of both lower extremities [R29.898]  Referring Physician:  Mattie Shaffer PA-C MD Orders:  PT Eval and Treat   Return MD Appt:  unknown    Date of Onset:  Onset Date: 22     Allergies:   Patient has no known allergies.  Restrictions/Precautions:   None      Interventions Planned (Treatment may consist of any combination of the following):     See Assessment Note    Subjective Comments:   Pt reports he is doing well.   Initial Pain Level:     0/10  Post Session Pain Level:      0/10  Medications Last Reviewed: 2025  Updated Objective Findings:  None Today  Treatment   THERAPEUTIC EXERCISE: (40 minutes):    Exercises per grid below to improve mobility, strength, and balance.  Required minimal verbal cues to promote proper body mechanics.  Progressed resistance, range, repetitions, and complexity of movement as indicated.     2025   Activity/Exercise Parameters                 Patient education    Bike 5 minutes  nustep   KTC  3 x 10 
stretcher

## (undated) DEVICE — UNDERPAD LINEN SAVER 17 X 24"

## (undated) DEVICE — DRSG BANDAID 0.75X3"

## (undated) DEVICE — ELCTR ECG CONDUCTIVE ADHESIVE

## (undated) DEVICE — BITE BLOCK ADULT 20 X 27MM (GREEN)

## (undated) DEVICE — BALLOON US ENDO

## (undated) DEVICE — TUBING SUCTION NONCONDUCTIVE 6MM X 12FT

## (undated) DEVICE — CATH IV SAFE BC 22G X 1" (BLUE)

## (undated) DEVICE — DVC AUTO CAP RX LOKG

## (undated) DEVICE — PACK IV START WITH CHG

## (undated) DEVICE — INJ SYS RAP REFIL

## (undated) DEVICE — SALIVA EJECTOR (BLUE)

## (undated) DEVICE — DENTURE CUP PINK

## (undated) DEVICE — SYR LUER LOK 3CC

## (undated) DEVICE — SOL IRR POUR NS 0.9% 500ML

## (undated) DEVICE — DRSG CURITY GAUZE SPONGE 4 X 4" 12-PLY NON-STERILE

## (undated) DEVICE — GOWN LG

## (undated) DEVICE — DRSG 2X2

## (undated) DEVICE — LINE BREATHE SAMPLNG

## (undated) DEVICE — BASIN EMESIS 10IN GRADUATED MAUVE

## (undated) DEVICE — SOL INJ NS 0.9% 500ML 1-PORT

## (undated) DEVICE — NDL HYPO SAFE 22G X 1" (BLACK)

## (undated) DEVICE — SYR LUER LOK 10CC

## (undated) DEVICE — OMNIPAQUE 300  30ML

## (undated) DEVICE — ORGANIZER MIO MEDICAL DEVICE DISP